# Patient Record
Sex: MALE | Race: WHITE | NOT HISPANIC OR LATINO | Employment: UNEMPLOYED | URBAN - METROPOLITAN AREA
[De-identification: names, ages, dates, MRNs, and addresses within clinical notes are randomized per-mention and may not be internally consistent; named-entity substitution may affect disease eponyms.]

---

## 2017-08-10 PROBLEM — D18.03 HEMANGIOMA OF LIVER: Status: ACTIVE | Noted: 2017-08-10

## 2017-08-10 PROBLEM — K62.5 BRBPR (BRIGHT RED BLOOD PER RECTUM): Status: ACTIVE | Noted: 2017-08-10

## 2018-03-15 PROBLEM — R76.8 HEPATITIS B CORE ANTIBODY POSITIVE: Status: ACTIVE | Noted: 2018-03-15

## 2021-09-20 ENCOUNTER — IMMUNIZATION (OUTPATIENT)
Dept: PRIMARY CARE CLINIC | Facility: CLINIC | Age: 51
End: 2021-09-20
Payer: MEDICAID

## 2021-09-20 DIAGNOSIS — Z23 NEED FOR VACCINATION: Primary | ICD-10-CM

## 2021-09-20 PROCEDURE — 0012A COVID-19, MRNA, LNP-S, PF, 100 MCG/0.5 ML DOSE VACCINE: CPT | Mod: CV19,PBBFAC | Performed by: INTERNAL MEDICINE

## 2021-09-24 ENCOUNTER — HOSPITAL ENCOUNTER (EMERGENCY)
Facility: HOSPITAL | Age: 51
Discharge: HOME | End: 2021-09-24
Attending: STUDENT IN AN ORGANIZED HEALTH CARE EDUCATION/TRAINING PROGRAM
Payer: MEDICAID

## 2021-09-24 VITALS
DIASTOLIC BLOOD PRESSURE: 83 MMHG | WEIGHT: 262.38 LBS | RESPIRATION RATE: 18 BRPM | TEMPERATURE: 97 F | SYSTOLIC BLOOD PRESSURE: 153 MMHG | HEART RATE: 72 BPM | BODY MASS INDEX: 37.64 KG/M2 | OXYGEN SATURATION: 99 %

## 2021-09-24 DIAGNOSIS — L02.512 ABSCESS OF FINGER OF LEFT HAND: Primary | ICD-10-CM

## 2021-09-24 PROCEDURE — 25000003 PHARM REV CODE 250: Performed by: STUDENT IN AN ORGANIZED HEALTH CARE EDUCATION/TRAINING PROGRAM

## 2021-09-24 PROCEDURE — 11730 AVULSION NAIL PLATE SIMPLE 1: CPT | Mod: FA

## 2021-09-24 PROCEDURE — 99283 EMERGENCY DEPT VISIT LOW MDM: CPT | Mod: 25

## 2021-09-24 RX ORDER — LIDOCAINE HYDROCHLORIDE 10 MG/ML
5 INJECTION, SOLUTION EPIDURAL; INFILTRATION; INTRACAUDAL; PERINEURAL
Status: COMPLETED | OUTPATIENT
Start: 2021-09-24 | End: 2021-09-24

## 2021-09-24 RX ORDER — SULFAMETHOXAZOLE AND TRIMETHOPRIM 800; 160 MG/1; MG/1
1 TABLET ORAL
Status: COMPLETED | OUTPATIENT
Start: 2021-09-24 | End: 2021-09-24

## 2021-09-24 RX ORDER — SULFAMETHOXAZOLE AND TRIMETHOPRIM 800; 160 MG/1; MG/1
1 TABLET ORAL 2 TIMES DAILY
Qty: 14 TABLET | Refills: 0 | Status: SHIPPED | OUTPATIENT
Start: 2021-09-24 | End: 2021-10-01

## 2021-09-24 RX ORDER — SULFAMETHOXAZOLE AND TRIMETHOPRIM 800; 160 MG/1; MG/1
1 TABLET ORAL 2 TIMES DAILY
Qty: 14 TABLET | Refills: 0 | Status: SHIPPED | OUTPATIENT
Start: 2021-09-24 | End: 2021-09-24 | Stop reason: SDUPTHER

## 2021-09-24 RX ADMIN — SULFAMETHOXAZOLE AND TRIMETHOPRIM 1 TABLET: 800; 160 TABLET ORAL at 10:09

## 2021-09-24 RX ADMIN — LIDOCAINE HYDROCHLORIDE 50 MG: 10 INJECTION, SOLUTION EPIDURAL; INFILTRATION; INTRACAUDAL; PERINEURAL at 10:09

## 2022-05-12 PROBLEM — R93.1 ELEVATED CORONARY ARTERY CALCIUM SCORE: Status: ACTIVE | Noted: 2022-05-12

## 2022-05-12 PROBLEM — I34.0 MODERATE TO SEVERE MITRAL REGURGITATION: Status: ACTIVE | Noted: 2022-05-12

## 2022-06-21 ENCOUNTER — OFFICE VISIT (OUTPATIENT)
Dept: CARDIOTHORACIC SURGERY | Facility: CLINIC | Age: 52
End: 2022-06-21
Payer: COMMERCIAL

## 2022-06-21 VITALS
HEART RATE: 81 BPM | DIASTOLIC BLOOD PRESSURE: 77 MMHG | BODY MASS INDEX: 41.99 KG/M2 | RESPIRATION RATE: 18 BRPM | SYSTOLIC BLOOD PRESSURE: 131 MMHG | HEIGHT: 70 IN | OXYGEN SATURATION: 95 % | WEIGHT: 293.31 LBS

## 2022-06-21 DIAGNOSIS — I34.0 MODERATE TO SEVERE MITRAL REGURGITATION: Primary | ICD-10-CM

## 2022-06-21 DIAGNOSIS — Q21.12 PFO (PATENT FORAMEN OVALE): ICD-10-CM

## 2022-06-21 PROCEDURE — 99205 OFFICE O/P NEW HI 60 MIN: CPT | Mod: S$GLB,,, | Performed by: THORACIC SURGERY (CARDIOTHORACIC VASCULAR SURGERY)

## 2022-06-21 PROCEDURE — 99205 PR OFFICE/OUTPT VISIT, NEW, LEVL V, 60-74 MIN: ICD-10-PCS | Mod: S$GLB,,, | Performed by: THORACIC SURGERY (CARDIOTHORACIC VASCULAR SURGERY)

## 2022-06-21 PROCEDURE — 99999 PR PBB SHADOW E&M-EST. PATIENT-LVL III: ICD-10-PCS | Mod: PBBFAC,,, | Performed by: THORACIC SURGERY (CARDIOTHORACIC VASCULAR SURGERY)

## 2022-06-21 PROCEDURE — 99999 PR PBB SHADOW E&M-EST. PATIENT-LVL III: CPT | Mod: PBBFAC,,, | Performed by: THORACIC SURGERY (CARDIOTHORACIC VASCULAR SURGERY)

## 2022-06-21 RX ORDER — ALBUTEROL SULFATE 90 UG/1
AEROSOL, METERED RESPIRATORY (INHALATION)
COMMUNITY

## 2022-06-21 RX ORDER — SODIUM PICOSULFATE, MAGNESIUM OXIDE, AND ANHYDROUS CITRIC ACID 10; 3.5; 12 MG/160ML; G/160ML; G/160ML
LIQUID ORAL
COMMUNITY
Start: 2022-01-20

## 2022-06-21 RX ORDER — IBUPROFEN 800 MG/1
800 TABLET ORAL 3 TIMES DAILY PRN
Status: ON HOLD | COMMUNITY
Start: 2022-06-07 | End: 2022-07-27 | Stop reason: HOSPADM

## 2022-06-21 NOTE — LETTER
[facility] - Cardiovasc Surg 2nd Fl  [address]  [facility] [address]  Phone: [phone] June 22, 2022          Hung Larsen MD  [address] [place] [id number]    Patient: Kong Celaya   MR Number: [id number]   YOB: 1970   Date of Visit: 6/21/2022     Dear Dr. Larsen:     I had the pleasure of seeing your patient, Mr. Kong Celaya, in clinic today.  As you know, he is a very pleasant 52-year-old gentleman with lifestyle limiting dyspnea on exertion.  He had a thorough evaluation which demonstrated moderate to severe mitral regurgitation as well as a patent foramen ovale.  I recommended mitral valve repair and closure of his patent foramen ovale, and he agreed with this. We will plan to get this done for him sometime in the near future.      Thank you for sending this pleasant gentleman to me.  It was a pleasure to meet him.  When he does come to surgery, I will certainly keep you appraised of his progress.    Sincerely,          Jet Gastelum MD      [facility]         
detailed exam

## 2022-06-21 NOTE — PROGRESS NOTES
Subjective:      Patient ID: Kong Celaya is a 52 y.o. male.    Chief Complaint: Consult      HPI:  Kong Celaya is a 52 y.o. male who presents as an initial consult for severe mitral regurgitation.  He has a medical history significant for asthma/bronchitis, mild diffuse coronary artery disease, elevated LFT's with liver hemangioma, and recently found severe mitral regurgitation with posterior leaflet prolapse. He reports that he recently found out about his mitral valve complication. He reports life limiting dyspnea on exertion, fatigue, and swelling. He reports swelling despite diuretic initiation.  He reports no longer being able to cut the grass due to SMART.      Family and social history reviewed    Review of patient's allergies indicates:   Allergen Reactions    Adhesive Other (See Comments)     Blisters      Past Medical History:   Diagnosis Date    Arthritis     Asthma     Bronchitis     COPD (chronic obstructive pulmonary disease)     Coronary artery disease     Elevated LFTs     GERD (gastroesophageal reflux disease)     Hepatitis B core antibody positive     PATIENT UNAWARE    Hernia of abdominal wall     Hx of colonic polyps     Liver hemangioma     Renal insufficiency      Past Surgical History:   Procedure Laterality Date    CHOLECYSTECTOMY      COLONOSCOPY N/A 4/12/2018    Procedure: COLONOSCOPY;  Surgeon: Sixto Robertson MD;  Location: [facility];  Service: Endoscopy;  Laterality: N/A;    LEFT HEART CATHETERIZATION Left 5/12/2022    Procedure: Left heart cath;  Surgeon: Hung Larsen MD;  Location: [facility] CATH;  Service: Cardiovascular;  Laterality: Left;    TONSILLECTOMY      TRANSESOPHAGEAL ECHOCARDIOGRAPHY N/A 5/12/2022    Procedure: ECHOCARDIOGRAM, TRANSESOPHAGEAL;  Surgeon: Hung Larsen MD;  Location: [facility] CATH;  Service: Cardiovascular;  Laterality: N/A;    UMBILICAL HERNIA REPAIR      UPPER GASTROINTESTINAL ENDOSCOPY       Family History      Problem Relation (Age of Onset)    Diabetes Mother        Social History     Socioeconomic History    Marital status:     Years of education: 10   Tobacco Use    Smoking status: Current Every Day Smoker     Packs/day: 0.50     Years: 25.00     Pack years: 12.50    Smokeless tobacco: Never Used   Substance and Sexual Activity    Alcohol use: Yes     Alcohol/week: 0.0 standard drinks     Comment: WEEKLY    Drug use: Not Currently     Types: Marijuana    Sexual activity: Yes     Partners: Female       Current Outpatient Medications:     albuterol (PROVENTIL/VENTOLIN HFA) 90 mcg/actuation inhaler, Inhale 2 puffs into the lungs every 6 (six) hours as needed for Wheezing. Rescue, Disp: , Rfl:     aspirin (ECOTRIN) 81 MG EC tablet, Take 81 mg by mouth once daily., Disp: , Rfl:     furosemide (LASIX) 20 MG tablet, Take 20 mg by mouth once daily., Disp: , Rfl:     rosuvastatin (CRESTOR) 10 MG tablet, Take 10 mg by mouth once daily., Disp: , Rfl:     albuterol (ACCUNEB) 0.63 mg/3 mL Nebu, Take 0.63 mg by nebulization every 6 (six) hours as needed., Disp: , Rfl:     albuterol (PROVENTIL/VENTOLIN HFA) 90 mcg/actuation inhaler, albuterol sulfate HFA 90 mcg/actuation aerosol inhaler  INHALE TWO PUFFS BY MOUTH TWICE A DAY AS NEEDED, Disp: , Rfl:     CLENPIQ 10 mg-3.5 gram -12 gram/160 mL Soln, use as directed, Disp: , Rfl:     fluticasone furoate-vilanteroL (BREO ELLIPTA) 200-25 mcg/dose DsDv diskus inhaler, Inhale 1 puff into the lungs once daily. Controller, Disp: , Rfl:      mg tablet, Take 800 mg by mouth 3 (three) times daily as needed., Disp: , Rfl:   Current medications Reviewed    Review of Systems   Constitutional: Positive for fatigue. Negative for activity change, appetite change and fever.   HENT: Negative for nosebleeds.    Respiratory: Positive for shortness of breath. Negative for cough.    Cardiovascular: Positive for leg swelling. Negative for chest pain and palpitations.    Gastrointestinal: Negative for abdominal distention, abdominal pain and nausea.   Genitourinary: Negative for frequency.   Musculoskeletal: Negative for arthralgias and myalgias.   Skin: Negative for rash.   Neurological: Negative for dizziness and numbness.   Hematological: Does not bruise/bleed easily.     Objective:   Physical Exam  Constitutional:       Appearance: He is obese.   HENT:      Head: Normocephalic and atraumatic.   Eyes:      Extraocular Movements: Extraocular movements intact.   Cardiovascular:      Rate and Rhythm: Normal rate and regular rhythm.   Pulmonary:      Effort: Pulmonary effort is normal.   Abdominal:      General: Abdomen is flat. There is distension.      Palpations: Abdomen is soft.   Musculoskeletal:         General: Normal range of motion.      Cervical back: Normal range of motion.      Right lower leg: Edema present.      Left lower leg: Edema present.   Skin:     General: Skin is warm and dry.      Capillary Refill: Capillary refill takes less than 2 seconds.   Neurological:      General: No focal deficit present.       Diagnostic Results: Reviewed   STS score less than 1%  ECHO:  Posterior mitral valve leaflet prolapse   Moderately Severe Mitral Regurgitation (3+)  Ejection fraction 50-55%  Bubble study positive for grade 3 PFO at baseline   Mild right and left atrial enlargement   Trivial to mild tricuspid regurgitation PA pressure is around 20  Assessment:   1. Severe Mitral Regurgitation  Plan:     CTS Attending Note:    I have personally taken the history and examined this patient and agree with the LEAH's note as stated above.  Very pleasant 52-year-old gentleman with lifestyle limiting dyspnea on exertion.  He had a thorough evaluation which demonstrated moderate to severe mitral regurgitation as well as a patent foramen ovale.  I recommended mitral valve repair and closure of his patent foramen ovale.  He agreed with this.  We discussed the risks and benefits of the  proposed procedure, including but not limited to death, stroke, respiratory complications, renal complications, arrythmia, need for permanent pacemaker, and infection. We discussed the STS predicted risk. His questions have been answered, and he wishes to proceed. After a discussion of the advantages and disadvantages of tissue and mechanical prostheses, he indicated that he desires a tissue valve should repair not be feasible.

## 2022-06-22 PROBLEM — Q21.12 PFO (PATENT FORAMEN OVALE): Status: ACTIVE | Noted: 2022-06-22

## 2022-06-28 ENCOUNTER — TELEPHONE (OUTPATIENT)
Dept: CARDIOTHORACIC SURGERY | Facility: CLINIC | Age: 52
End: 2022-06-28
Payer: COMMERCIAL

## 2022-06-28 DIAGNOSIS — I34.0 MODERATE TO SEVERE MITRAL REGURGITATION: Primary | ICD-10-CM

## 2022-06-28 NOTE — TELEPHONE ENCOUNTER
Called pt to review pre-op testing appointments which have been scheduled for Thursday, 7/21. Spoke with pt's wife who verbalized understanding of appointment date, times, and location. Will mail appointment slips to pt's confirmed mailing address. Pt's wife will have pt call to review medications.    Received call from pt. Reviewed medications. Pt will not need to hold any medications prior to surgery. Additional instructions on preparing for surgery will be given at pre-op appointment on 7/21. Pt verbalized understanding of all information.

## 2022-07-21 ENCOUNTER — ANESTHESIA EVENT (OUTPATIENT)
Dept: SURGERY | Facility: HOSPITAL | Age: 52
DRG: 220 | End: 2022-07-21
Payer: COMMERCIAL

## 2022-07-21 ENCOUNTER — HOSPITAL ENCOUNTER (OUTPATIENT)
Dept: CARDIOLOGY | Facility: HOSPITAL | Age: 52
Discharge: HOME | End: 2022-07-21
Attending: THORACIC SURGERY (CARDIOTHORACIC VASCULAR SURGERY)
Payer: COMMERCIAL

## 2022-07-21 ENCOUNTER — HOSPITAL ENCOUNTER (OUTPATIENT)
Dept: RADIOLOGY | Facility: HOSPITAL | Age: 52
Discharge: HOME | End: 2022-07-21
Attending: THORACIC SURGERY (CARDIOTHORACIC VASCULAR SURGERY)
Payer: COMMERCIAL

## 2022-07-21 ENCOUNTER — HOSPITAL ENCOUNTER (OUTPATIENT)
Dept: PULMONOLOGY | Facility: CLINIC | Age: 52
Discharge: HOME | End: 2022-07-21
Payer: COMMERCIAL

## 2022-07-21 ENCOUNTER — TELEPHONE (OUTPATIENT)
Dept: CARDIOTHORACIC SURGERY | Facility: CLINIC | Age: 52
End: 2022-07-21

## 2022-07-21 ENCOUNTER — OFFICE VISIT (OUTPATIENT)
Dept: CARDIOTHORACIC SURGERY | Facility: CLINIC | Age: 52
End: 2022-07-21
Payer: COMMERCIAL

## 2022-07-21 ENCOUNTER — HOSPITAL ENCOUNTER (OUTPATIENT)
Dept: VASCULAR SURGERY | Facility: CLINIC | Age: 52
Discharge: HOME | End: 2022-07-21
Attending: THORACIC SURGERY (CARDIOTHORACIC VASCULAR SURGERY)
Payer: COMMERCIAL

## 2022-07-21 ENCOUNTER — HOSPITAL ENCOUNTER (OUTPATIENT)
Dept: CARDIOLOGY | Facility: CLINIC | Age: 52
Discharge: HOME | End: 2022-07-21
Payer: COMMERCIAL

## 2022-07-21 VITALS
BODY MASS INDEX: 41.95 KG/M2 | DIASTOLIC BLOOD PRESSURE: 76 MMHG | WEIGHT: 293 LBS | HEART RATE: 78 BPM | SYSTOLIC BLOOD PRESSURE: 130 MMHG | HEIGHT: 70 IN

## 2022-07-21 VITALS
BODY MASS INDEX: 42.61 KG/M2 | WEIGHT: 297.63 LBS | HEART RATE: 79 BPM | SYSTOLIC BLOOD PRESSURE: 131 MMHG | DIASTOLIC BLOOD PRESSURE: 76 MMHG | HEIGHT: 70 IN | OXYGEN SATURATION: 96 % | RESPIRATION RATE: 18 BRPM

## 2022-07-21 DIAGNOSIS — I34.0 MODERATE TO SEVERE MITRAL REGURGITATION: ICD-10-CM

## 2022-07-21 DIAGNOSIS — I65.22 STENOSIS OF LEFT CAROTID ARTERY: ICD-10-CM

## 2022-07-21 DIAGNOSIS — I34.0 MITRAL VALVE REGURGITATION: ICD-10-CM

## 2022-07-21 DIAGNOSIS — Z01.818 PRE-OP EXAM: ICD-10-CM

## 2022-07-21 DIAGNOSIS — I34.0 MODERATE TO SEVERE MITRAL REGURGITATION: Primary | ICD-10-CM

## 2022-07-21 LAB
ASCENDING AORTA: 3.49 CM
AV INDEX (PROSTH): 0.98
AV MEAN GRADIENT: 3 MMHG
AV PEAK GRADIENT: 9 MMHG
AV VALVE AREA: 4.36 CM2
AV VELOCITY RATIO: 0.92
BSA FOR ECHO PROCEDURE: 2.56 M2
CV ECHO LV RWT: 0.28 CM
DOP CALC AO PEAK VEL: 1.46 M/S
DOP CALC AO VTI: 24.4 CM
DOP CALC LVOT AREA: 4.4 CM2
DOP CALC LVOT DIAMETER: 2.38 CM
DOP CALC LVOT PEAK VEL: 1.35 M/S
DOP CALC LVOT STROKE VOLUME: 106.32 CM3
DOP CALC MV VTI: 23.03 CM
DOP CALCLVOT PEAK VEL VTI: 23.91 CM
E WAVE DECELERATION TIME: 166.06 MSEC
E/A RATIO: 1.14
E/E' RATIO: 6.38 M/S
ECHO LV POSTERIOR WALL: 0.8 CM (ref 0.6–1.1)
EJECTION FRACTION: 60 %
FRACTIONAL SHORTENING: 28 % (ref 28–44)
INTERVENTRICULAR SEPTUM: 0.8 CM (ref 0.6–1.1)
IVRT: 77.07 MSEC
LA MAJOR: 5.46 CM
LA MINOR: 5.49 CM
LA WIDTH: 4.27 CM
LEFT ATRIUM SIZE: 4.22 CM
LEFT ATRIUM VOLUME INDEX MOD: 39.1 ML/M2
LEFT ATRIUM VOLUME INDEX: 34.1 ML/M2
LEFT ATRIUM VOLUME MOD: 96.26 CM3
LEFT ATRIUM VOLUME: 83.86 CM3
LEFT INTERNAL DIMENSION IN SYSTOLE: 4.03 CM (ref 2.1–4)
LEFT VENTRICLE DIASTOLIC VOLUME INDEX: 62.85 ML/M2
LEFT VENTRICLE DIASTOLIC VOLUME: 154.62 ML
LEFT VENTRICLE MASS INDEX: 68 G/M2
LEFT VENTRICLE SYSTOLIC VOLUME INDEX: 29 ML/M2
LEFT VENTRICLE SYSTOLIC VOLUME: 71.29 ML
LEFT VENTRICULAR INTERNAL DIMENSION IN DIASTOLE: 5.62 CM (ref 3.5–6)
LEFT VENTRICULAR MASS: 166.05 G
LV LATERAL E/E' RATIO: 5.53 M/S
LV SEPTAL E/E' RATIO: 7.55 M/S
MV A" WAVE DURATION": 7.99 MSEC
MV MEAN GRADIENT: 1 MMHG
MV PEAK A VEL: 0.73 M/S
MV PEAK E VEL: 0.83 M/S
MV PEAK GRADIENT: 5 MMHG
MV STENOSIS PRESSURE HALF TIME: 48.16 MS
MV VALVE AREA BY CONTINUITY EQUATION: 4.62 CM2
MV VALVE AREA P 1/2 METHOD: 4.57 CM2
PULM VEIN S/D RATIO: 0.72
PV PEAK D VEL: 0.72 M/S
PV PEAK S VEL: 0.52 M/S
QEF: 59 %
RA MAJOR: 5.35 CM
RA PRESSURE: 3 MMHG
RA WIDTH: 4.08 CM
RIGHT VENTRICULAR END-DIASTOLIC DIMENSION: 4.39 CM
RV TISSUE DOPPLER FREE WALL SYSTOLIC VELOCITY 1 (APICAL 4 CHAMBER VIEW): 16.04 CM/S
SINUS: 3.46 CM
STJ: 2.68 CM
TDI LATERAL: 0.15 M/S
TDI SEPTAL: 0.11 M/S
TDI: 0.13 M/S
TRICUSPID ANNULAR PLANE SYSTOLIC EXCURSION: 2.57 CM

## 2022-07-21 PROCEDURE — 71046 XR CHEST PA AND LATERAL: ICD-10-PCS | Mod: 26,,, | Performed by: RADIOLOGY

## 2022-07-21 PROCEDURE — 93306 ECHO (CUPID ONLY): ICD-10-PCS | Mod: 26,,, | Performed by: INTERNAL MEDICINE

## 2022-07-21 PROCEDURE — 94010 BREATHING CAPACITY TEST: CPT | Mod: S$GLB,,, | Performed by: INTERNAL MEDICINE

## 2022-07-21 PROCEDURE — 3044F PR MOST RECENT HEMOGLOBIN A1C LEVEL <7.0%: ICD-10-PCS | Mod: CPTII,S$GLB,, | Performed by: THORACIC SURGERY (CARDIOTHORACIC VASCULAR SURGERY)

## 2022-07-21 PROCEDURE — 93880 EXTRACRANIAL BILAT STUDY: CPT | Mod: S$GLB,,, | Performed by: SURGERY

## 2022-07-21 PROCEDURE — 99499 NO LOS: ICD-10-PCS | Mod: S$GLB,,, | Performed by: THORACIC SURGERY (CARDIOTHORACIC VASCULAR SURGERY)

## 2022-07-21 PROCEDURE — 93005 ELECTROCARDIOGRAM TRACING: CPT | Mod: S$GLB,,, | Performed by: THORACIC SURGERY (CARDIOTHORACIC VASCULAR SURGERY)

## 2022-07-21 PROCEDURE — 93010 ELECTROCARDIOGRAM REPORT: CPT | Mod: S$GLB,,, | Performed by: INTERNAL MEDICINE

## 2022-07-21 PROCEDURE — 3078F DIAST BP <80 MM HG: CPT | Mod: CPTII,S$GLB,, | Performed by: THORACIC SURGERY (CARDIOTHORACIC VASCULAR SURGERY)

## 2022-07-21 PROCEDURE — 93880 PR DUPLEX SCAN EXTRACRANIAL,BILAT: ICD-10-PCS | Mod: S$GLB,,, | Performed by: SURGERY

## 2022-07-21 PROCEDURE — 94010 BREATHING CAPACITY TEST: ICD-10-PCS | Mod: S$GLB,,, | Performed by: INTERNAL MEDICINE

## 2022-07-21 PROCEDURE — 99999 PR PBB SHADOW E&M-EST. PATIENT-LVL IV: CPT | Mod: PBBFAC,,, | Performed by: THORACIC SURGERY (CARDIOTHORACIC VASCULAR SURGERY)

## 2022-07-21 PROCEDURE — 1159F PR MEDICATION LIST DOCUMENTED IN MEDICAL RECORD: ICD-10-PCS | Mod: CPTII,S$GLB,, | Performed by: THORACIC SURGERY (CARDIOTHORACIC VASCULAR SURGERY)

## 2022-07-21 PROCEDURE — 3075F PR MOST RECENT SYSTOLIC BLOOD PRESS GE 130-139MM HG: ICD-10-PCS | Mod: CPTII,S$GLB,, | Performed by: THORACIC SURGERY (CARDIOTHORACIC VASCULAR SURGERY)

## 2022-07-21 PROCEDURE — 99499 UNLISTED E&M SERVICE: CPT | Mod: S$GLB,,, | Performed by: THORACIC SURGERY (CARDIOTHORACIC VASCULAR SURGERY)

## 2022-07-21 PROCEDURE — 93306 TTE W/DOPPLER COMPLETE: CPT

## 2022-07-21 PROCEDURE — 93306 TTE W/DOPPLER COMPLETE: CPT | Mod: 26,,, | Performed by: INTERNAL MEDICINE

## 2022-07-21 PROCEDURE — 3075F SYST BP GE 130 - 139MM HG: CPT | Mod: CPTII,S$GLB,, | Performed by: THORACIC SURGERY (CARDIOTHORACIC VASCULAR SURGERY)

## 2022-07-21 PROCEDURE — 93010 EKG 12-LEAD: ICD-10-PCS | Mod: S$GLB,,, | Performed by: INTERNAL MEDICINE

## 2022-07-21 PROCEDURE — 99999 PR PBB SHADOW E&M-EST. PATIENT-LVL IV: ICD-10-PCS | Mod: PBBFAC,,, | Performed by: THORACIC SURGERY (CARDIOTHORACIC VASCULAR SURGERY)

## 2022-07-21 PROCEDURE — 94729 PR C02/MEMBANE DIFFUSE CAPACITY: ICD-10-PCS | Mod: S$GLB,,, | Performed by: INTERNAL MEDICINE

## 2022-07-21 PROCEDURE — 71046 X-RAY EXAM CHEST 2 VIEWS: CPT | Mod: 26,,, | Performed by: RADIOLOGY

## 2022-07-21 PROCEDURE — 3078F PR MOST RECENT DIASTOLIC BLOOD PRESSURE < 80 MM HG: ICD-10-PCS | Mod: CPTII,S$GLB,, | Performed by: THORACIC SURGERY (CARDIOTHORACIC VASCULAR SURGERY)

## 2022-07-21 PROCEDURE — 93005 EKG 12-LEAD: ICD-10-PCS | Mod: S$GLB,,, | Performed by: THORACIC SURGERY (CARDIOTHORACIC VASCULAR SURGERY)

## 2022-07-21 PROCEDURE — 3008F PR BODY MASS INDEX (BMI) DOCUMENTED: ICD-10-PCS | Mod: CPTII,S$GLB,, | Performed by: THORACIC SURGERY (CARDIOTHORACIC VASCULAR SURGERY)

## 2022-07-21 PROCEDURE — 3008F BODY MASS INDEX DOCD: CPT | Mod: CPTII,S$GLB,, | Performed by: THORACIC SURGERY (CARDIOTHORACIC VASCULAR SURGERY)

## 2022-07-21 PROCEDURE — 3044F HG A1C LEVEL LT 7.0%: CPT | Mod: CPTII,S$GLB,, | Performed by: THORACIC SURGERY (CARDIOTHORACIC VASCULAR SURGERY)

## 2022-07-21 PROCEDURE — 94729 DIFFUSING CAPACITY: CPT | Mod: S$GLB,,, | Performed by: INTERNAL MEDICINE

## 2022-07-21 PROCEDURE — 71046 X-RAY EXAM CHEST 2 VIEWS: CPT | Mod: TC,FY

## 2022-07-21 PROCEDURE — 1159F MED LIST DOCD IN RCRD: CPT | Mod: CPTII,S$GLB,, | Performed by: THORACIC SURGERY (CARDIOTHORACIC VASCULAR SURGERY)

## 2022-07-21 RX ORDER — DOCUSATE SODIUM 100 MG/1
100 CAPSULE, LIQUID FILLED ORAL 2 TIMES DAILY
Status: CANCELLED | OUTPATIENT
Start: 2022-07-21

## 2022-07-21 RX ORDER — POLYETHYLENE GLYCOL 3350 17 G/17G
17 POWDER, FOR SOLUTION ORAL DAILY
Status: CANCELLED | OUTPATIENT
Start: 2022-07-22

## 2022-07-21 RX ORDER — POTASSIUM CHLORIDE 14.9 MG/ML
60 INJECTION INTRAVENOUS
Status: CANCELLED | OUTPATIENT
Start: 2022-07-21

## 2022-07-21 RX ORDER — ONDANSETRON 2 MG/ML
4 INJECTION INTRAMUSCULAR; INTRAVENOUS EVERY 12 HOURS PRN
Status: CANCELLED | OUTPATIENT
Start: 2022-07-21

## 2022-07-21 RX ORDER — MAGNESIUM SULFATE HEPTAHYDRATE 40 MG/ML
4 INJECTION, SOLUTION INTRAVENOUS
Status: CANCELLED | OUTPATIENT
Start: 2022-07-21

## 2022-07-21 RX ORDER — ACETAMINOPHEN 325 MG/1
650 TABLET ORAL EVERY 4 HOURS PRN
Status: CANCELLED | OUTPATIENT
Start: 2022-07-21

## 2022-07-21 RX ORDER — ACETAMINOPHEN 500 MG
1000 TABLET ORAL
Status: CANCELLED | OUTPATIENT
Start: 2022-07-22 | End: 2022-07-22

## 2022-07-21 RX ORDER — POTASSIUM CHLORIDE 29.8 MG/ML
40 INJECTION INTRAVENOUS
Status: CANCELLED | OUTPATIENT
Start: 2022-07-21

## 2022-07-21 RX ORDER — FAMOTIDINE 10 MG/ML
20 INJECTION INTRAVENOUS 2 TIMES DAILY
Status: CANCELLED | OUTPATIENT
Start: 2022-07-21

## 2022-07-21 RX ORDER — ASPIRIN 325 MG
325 TABLET, DELAYED RELEASE (ENTERIC COATED) ORAL DAILY
Status: CANCELLED | OUTPATIENT
Start: 2022-07-22

## 2022-07-21 RX ORDER — PROPOFOL 10 MG/ML
0-50 INJECTION, EMULSION INTRAVENOUS CONTINUOUS
Status: CANCELLED | OUTPATIENT
Start: 2022-07-21

## 2022-07-21 RX ORDER — IPRATROPIUM BROMIDE AND ALBUTEROL SULFATE 2.5; .5 MG/3ML; MG/3ML
3 SOLUTION RESPIRATORY (INHALATION) EVERY 4 HOURS PRN
Status: CANCELLED | OUTPATIENT
Start: 2022-07-21 | End: 2022-07-22

## 2022-07-21 RX ORDER — MUPIROCIN 20 MG/G
1 OINTMENT TOPICAL 2 TIMES DAILY
Status: CANCELLED | OUTPATIENT
Start: 2022-07-21 | End: 2022-07-26

## 2022-07-21 RX ORDER — MAGNESIUM SULFATE HEPTAHYDRATE 40 MG/ML
2 INJECTION, SOLUTION INTRAVENOUS
Status: CANCELLED | OUTPATIENT
Start: 2022-07-21

## 2022-07-21 RX ORDER — DEXTROSE MONOHYDRATE, SODIUM CHLORIDE, AND POTASSIUM CHLORIDE 50; 1.49; 4.5 G/1000ML; G/1000ML; G/1000ML
INJECTION, SOLUTION INTRAVENOUS CONTINUOUS
Status: CANCELLED | OUTPATIENT
Start: 2022-07-21

## 2022-07-21 RX ORDER — BUDESONIDE AND FORMOTEROL FUMARATE DIHYDRATE 160; 4.5 UG/1; UG/1
2 AEROSOL RESPIRATORY (INHALATION) 2 TIMES DAILY
COMMUNITY
Start: 2022-07-12

## 2022-07-21 RX ORDER — ASPIRIN 300 MG/1
300 SUPPOSITORY RECTAL ONCE AS NEEDED
Status: CANCELLED | OUTPATIENT
Start: 2022-07-21 | End: 2033-12-17

## 2022-07-21 RX ORDER — POTASSIUM CHLORIDE 20 MEQ/1
20 TABLET, EXTENDED RELEASE ORAL EVERY 12 HOURS
Status: CANCELLED | OUTPATIENT
Start: 2022-07-21

## 2022-07-21 RX ORDER — CEFAZOLIN SODIUM/D5W 2 G/100 ML
2 PLASTIC BAG, INJECTION (ML) INTRAVENOUS
Status: CANCELLED | OUTPATIENT
Start: 2022-07-21

## 2022-07-21 RX ORDER — MUPIROCIN 20 MG/G
1 OINTMENT TOPICAL
Status: CANCELLED | OUTPATIENT
Start: 2022-07-21

## 2022-07-21 RX ORDER — BISACODYL 10 MG
10 SUPPOSITORY, RECTAL RECTAL DAILY PRN
Status: CANCELLED | OUTPATIENT
Start: 2022-07-21

## 2022-07-21 RX ORDER — POTASSIUM CHLORIDE 14.9 MG/ML
20 INJECTION INTRAVENOUS
Status: CANCELLED | OUTPATIENT
Start: 2022-07-21

## 2022-07-21 RX ORDER — METOPROLOL TARTRATE 25 MG/1
25 TABLET, FILM COATED ORAL
Status: CANCELLED | OUTPATIENT
Start: 2022-07-21

## 2022-07-21 RX ORDER — ASPIRIN 325 MG
325 TABLET ORAL DAILY
Status: CANCELLED | OUTPATIENT
Start: 2022-07-21

## 2022-07-21 RX ORDER — FENTANYL CITRATE 50 UG/ML
50 INJECTION, SOLUTION INTRAMUSCULAR; INTRAVENOUS
Status: CANCELLED | OUTPATIENT
Start: 2022-07-23

## 2022-07-21 RX ORDER — CEFAZOLIN SODIUM/D5W 2 G/100 ML
2 PLASTIC BAG, INJECTION (ML) INTRAVENOUS
Status: CANCELLED | OUTPATIENT
Start: 2022-07-21 | End: 2022-07-23

## 2022-07-21 RX ORDER — ATORVASTATIN CALCIUM 40 MG/1
40 TABLET, FILM COATED ORAL NIGHTLY
Status: CANCELLED | OUTPATIENT
Start: 2022-07-21

## 2022-07-21 RX ORDER — METOCLOPRAMIDE HYDROCHLORIDE 5 MG/ML
5 INJECTION INTRAMUSCULAR; INTRAVENOUS EVERY 6 HOURS PRN
Status: CANCELLED | OUTPATIENT
Start: 2022-07-21

## 2022-07-21 RX ORDER — LIDOCAINE HYDROCHLORIDE 10 MG/ML
1 INJECTION, SOLUTION EPIDURAL; INFILTRATION; INTRACAUDAL; PERINEURAL
Status: CANCELLED | OUTPATIENT
Start: 2022-07-21

## 2022-07-21 RX ORDER — OXYCODONE HYDROCHLORIDE 5 MG/1
5 TABLET ORAL EVERY 4 HOURS PRN
Status: CANCELLED | OUTPATIENT
Start: 2022-07-21

## 2022-07-21 RX ORDER — OXYCODONE HYDROCHLORIDE 10 MG/1
10 TABLET ORAL EVERY 4 HOURS PRN
Status: CANCELLED | OUTPATIENT
Start: 2022-07-21

## 2022-07-21 RX ORDER — FENTANYL CITRATE 50 UG/ML
25 INJECTION, SOLUTION INTRAMUSCULAR; INTRAVENOUS
Status: CANCELLED | OUTPATIENT
Start: 2022-07-21

## 2022-07-21 RX ORDER — IPRATROPIUM BROMIDE AND ALBUTEROL SULFATE 2.5; .5 MG/3ML; MG/3ML
3 SOLUTION RESPIRATORY (INHALATION) EVERY 4 HOURS
Status: CANCELLED | OUTPATIENT
Start: 2022-07-21 | End: 2022-07-22

## 2022-07-21 RX ORDER — ALBUMIN HUMAN 50 G/1000ML
25 SOLUTION INTRAVENOUS ONCE AS NEEDED
Status: CANCELLED | OUTPATIENT
Start: 2022-07-21 | End: 2033-12-17

## 2022-07-21 RX ORDER — SODIUM CHLORIDE 0.9 % (FLUSH) 0.9 %
10 SYRINGE (ML) INJECTION
Status: CANCELLED | OUTPATIENT
Start: 2022-07-21

## 2022-07-21 RX ORDER — SODIUM CHLORIDE 9 MG/ML
INJECTION, SOLUTION INTRAVENOUS CONTINUOUS
Status: CANCELLED | OUTPATIENT
Start: 2022-07-21

## 2022-07-21 RX ORDER — ASPIRIN 325 MG
325 TABLET ORAL ONCE
Status: CANCELLED | OUTPATIENT
Start: 2022-07-21 | End: 2022-07-21

## 2022-07-21 RX ORDER — FENTANYL CITRATE 50 UG/ML
25 INJECTION, SOLUTION INTRAMUSCULAR; INTRAVENOUS
Status: CANCELLED | OUTPATIENT
Start: 2022-07-21 | End: 2022-07-22

## 2022-07-21 RX ORDER — FAMOTIDINE 20 MG/1
20 TABLET, FILM COATED ORAL 2 TIMES DAILY
Status: CANCELLED | OUTPATIENT
Start: 2022-07-21

## 2022-07-21 RX ORDER — ALBUTEROL SULFATE 0.83 MG/ML
2.5 SOLUTION RESPIRATORY (INHALATION) EVERY 4 HOURS PRN
COMMUNITY
Start: 2022-06-28

## 2022-07-21 NOTE — H&P (VIEW-ONLY)
Subjective:      Patient ID: Kong Celaya is a 52 y.o. male.    Chief Complaint: No chief complaint on file.      HPI:  Kong Celaya is a 52 y.o. male who presents to pre-op for MVr and PFO closure secondary to severe mitral regurgitation.  He has a medical history significant for asthma/bronchitis, mild diffuse coronary artery disease, elevated LFT's with liver hemangioma, and recently found severe mitral regurgitation with posterior leaflet prolapse. He reports that he recently found out about his mitral valve complication. He reports life limiting dyspnea on exertion, fatigue, and swelling. He reports swelling despite diuretic initiation.  He reports no longer being able to cut the grass due to SMART.      Current medications Reviewed    Review of Systems   Constitutional: Negative for activity change, appetite change, fatigue and fever.   HENT: Negative for nosebleeds.    Respiratory: Negative for cough and shortness of breath.    Cardiovascular: Negative for chest pain, palpitations and leg swelling.   Gastrointestinal: Negative for abdominal distention, abdominal pain and nausea.   Genitourinary: Negative for frequency.   Musculoskeletal: Negative for arthralgias and myalgias.   Skin: Negative for rash.   Neurological: Negative for dizziness and numbness.   Hematological: Does not bruise/bleed easily.     Objective:   Physical Exam  HENT:      Head: Normocephalic and atraumatic.   Eyes:      Extraocular Movements: Extraocular movements intact.   Cardiovascular:      Rate and Rhythm: Normal rate and regular rhythm.   Pulmonary:      Effort: Pulmonary effort is normal.   Abdominal:      General: Abdomen is flat.      Palpations: Abdomen is soft.   Musculoskeletal:         General: Normal range of motion.      Cervical back: Normal range of motion.   Skin:     General: Skin is warm and dry.      Capillary Refill: Capillary refill takes less than 2 seconds.   Neurological:      General: No focal  deficit present.       Diagnotic Results:    Carotid:  Right Side:  Normal   Left Side:  1-39%    FEV1 58.1  DLCO: 84.2  ECHO:  Posterior mitral valve leaflet prolapse   Moderately Severe Mitral Regurgitation (3+)  Ejection fraction 50-55%  Bubble study positive for grade 3 PFO at baseline   Mild right and left atrial enlargement   Trivial to mild tricuspid regurgitation PA pressure is around 20  Assessment:   1. Severe Mitral Valve Regurgitation  Plan:   Continue with planned MVr and PFO closure  CTS Attending Note:    I have personally taken the history and examined this patient and agree with the LEAH's note as stated above.  Very pleasant 52-year-old gentleman with severe MR and patent foramen ovale.  We plan mitral valve repair and closure of PFO.  His questions have been answered, and he wishes to proceed.

## 2022-07-21 NOTE — ANESTHESIA PREPROCEDURE EVALUATION
[facility]  Anesthesia Pre-Operative Evaluation         Patient Name: Kong Celaya  YOB: 1970  MRN: [id number]    SUBJECTIVE:     Pre-operative evaluation for Procedure(s) (LRB):  Valvuloplasty, Mitral (N/A)  REPLACEMENT, MITRAL VALVE (N/A)     07/21/2022    Kong Celaya is a 52 y.o. male w/ a significant PMHx of gerd, morbid obesity, COPD, CAD, liver hemangioma, asthma, MR w posterior leaflet prolapse and PFO. found severe mitral regurgitation with posterior leaflet prolapse. He reports life limiting dyspnea on exertion, fatigue, and swelling (despite diruesis).     Patient now presents for the above procedure(s).    TTE 7/21/22:  · The left ventricle is mildly enlarged with normal systolic function.  · The estimated ejection fraction is 60%.  · Normal left ventricular diastolic function.  · Moderate mitral regurgitation.  · Normal right ventricular size with normal right ventricular systolic function.  · The quantitatively derived ejection fraction is 59%.  · Mild left atrial enlargement.  · There is moderate mitral prolapse of the posterior mitral leaflet.  · Mild tricuspid regurgitation.  · Normal central venous pressure (3 mmHg).      LDA: None documented.     Prev airway:   Placement Date: 10/22/15; Placement Time: 0728; Method of Intubation: Direct laryngoscopy; Inserted by: CRNA; Airway Device: Endotracheal Tube-Hi/Lo; Induction type: IV - routine; Mask Ventilation: Easy - oral; Intubated: Postinduction; Blade: Patrick #2; Airway Device Size: 8.0; Style: Cuffed; Cuff Inflation: Minimal occlusive pressure; Placement Verified By: Auscultation, Colorimetric EtCO2 device; Grade: Grade I; Complicating Factors: None; Intubation Findings: Positive EtCO2, Bilateral breath sounds, Atraumatic/Condition of teeth unchanged;  Depth of Insertion: 22; Securment: Lips; Complications: None; Breath Sounds: Equal Bilateral; Insertion Attempts: 1; Removal Date: 10/22/15;   Removal Time: 0907    Drips: None documented.      Patient Active Problem List   Diagnosis    Hemangioma of liver    BRBPR (bright red blood per rectum)    Hepatitis B core antibody positive    Moderate to severe mitral regurgitation    Elevated coronary artery calcium score    Angina, class II    PFO (patent foramen ovale)    Pre-op exam    Stenosis of left carotid artery       Review of patient's allergies indicates:   Allergen Reactions    Adhesive Other (See Comments)     Blisters        Current Inpatient Medications:      No current facility-administered medications on file prior to encounter.     Current Outpatient Medications on File Prior to Encounter   Medication Sig Dispense Refill    albuterol (ACCUNEB) 0.63 mg/3 mL Nebu Take 0.63 mg by nebulization every 6 (six) hours as needed.      albuterol (PROVENTIL/VENTOLIN HFA) 90 mcg/actuation inhaler Inhale 2 puffs into the lungs every 6 (six) hours as needed for Wheezing. Rescue      albuterol (PROVENTIL/VENTOLIN HFA) 90 mcg/actuation inhaler albuterol sulfate HFA 90 mcg/actuation aerosol inhaler   INHALE TWO PUFFS BY MOUTH TWICE A DAY AS NEEDED      aspirin (ECOTRIN) 81 MG EC tablet Take 81 mg by mouth once daily.      CLENPIQ 10 mg-3.5 gram -12 gram/160 mL Soln use as directed      fluticasone furoate-vilanteroL (BREO ELLIPTA) 200-25 mcg/dose DsDv diskus inhaler Inhale 1 puff into the lungs once daily. Controller      furosemide (LASIX) 20 MG tablet Take 20 mg by mouth once daily.       mg tablet Take 800 mg by mouth 3 (three) times daily as needed.      rosuvastatin (CRESTOR) 10 MG tablet Take 10 mg by mouth once daily.         Past Surgical History:   Procedure Laterality Date    CHOLECYSTECTOMY      COLONOSCOPY N/A 4/12/2018    Procedure: COLONOSCOPY;  Surgeon: Sixto Robertson MD;  Location: [facility];  Service: Endoscopy;  Laterality: N/A;    LEFT HEART CATHETERIZATION Left 5/12/2022    Procedure: Left heart cath;   Surgeon: Hung Larsen MD;  Location: [facility] CATH;  Service: Cardiovascular;  Laterality: Left;    TONSILLECTOMY      TRANSESOPHAGEAL ECHOCARDIOGRAPHY N/A 5/12/2022    Procedure: ECHOCARDIOGRAM, TRANSESOPHAGEAL;  Surgeon: Hung Larsen MD;  Location: [facility] CATH;  Service: Cardiovascular;  Laterality: N/A;    UMBILICAL HERNIA REPAIR      UPPER GASTROINTESTINAL ENDOSCOPY         Social History     Socioeconomic History    Marital status:     Years of education: 10   Tobacco Use    Smoking status: Current Every Day Smoker     Packs/day: 0.50     Years: 25.00     Pack years: 12.50    Smokeless tobacco: Never Used   Substance and Sexual Activity    Alcohol use: Yes     Alcohol/week: 0.0 standard drinks     Comment: WEEKLY    Drug use: Not Currently     Types: Marijuana    Sexual activity: Yes     Partners: Female       OBJECTIVE:     Vital Signs Range (Last 24H):  Pulse:  [78-79]   Resp:  [18]   BP: (130-131)/(76)   SpO2:  [96 %]       Significant Labs:  Lab Results   Component Value Date    WBC 8.55 07/21/2022    HGB 15.6 07/21/2022    HCT 46.6 07/21/2022     07/21/2022    ALT 76 (H) 07/21/2022    AST 41 (H) 07/21/2022     07/21/2022    K 4.3 07/21/2022     07/21/2022    CREATININE 0.9 07/21/2022    BUN 11 07/21/2022    CO2 30 (H) 07/21/2022    INR 1.0 07/21/2022    HGBA1C 5.4 07/21/2022       Diagnostic Studies: No relevant studies.    EKG:   Results for orders placed or performed during the hospital encounter of 07/21/22   EKG 12-lead    Collection Time: 07/21/22 11:52 AM    Narrative    Test Reason : I34.0,    Vent. Rate : 086 BPM     Atrial Rate : 086 BPM     P-R Int : 156 ms          QRS Dur : 086 ms      QT Int : 352 ms       P-R-T Axes : 070 064 051 degrees     QTc Int : 421 ms    Normal sinus rhythm  Normal ECG  No previous ECGs available  Confirmed by Giles Olson MD ([id number]) on 7/21/2022 2:07:10 PM    Referred By: ADELINA CABELLO           Confirmed By:Giles Olson MD  "      2D ECHO:  TTE:  Results for orders placed or performed during the hospital encounter of 07/21/22   Echo   Result Value Ref Range    BSA 2.56 m2    TDI SEPTAL 0.11 m/s    LV LATERAL E/E' RATIO 5.53 m/s    LV SEPTAL E/E' RATIO 7.55 m/s    LA WIDTH 4.27 cm    TDI LATERAL 0.15 m/s    LVIDd 5.62 3.5 - 6.0 cm    IVS 0.80 0.6 - 1.1 cm    Posterior Wall 0.80 0.6 - 1.1 cm    LVIDs 4.03 (A) 2.1 - 4.0 cm    FS 28 28 - 44 %    LA volume 83.86 cm3    Sinus 3.46 cm    STJ 2.68 cm    Ascending aorta 3.49 cm    LV mass 166.05 g    LA size 4.22 cm    RVDD 4.39 cm    TAPSE 2.57 cm    RV S' 16.04 cm/s    Left Ventricle Relative Wall Thickness 0.28 cm    AV mean gradient 3 mmHg    AV valve area 4.36 cm2    AV Velocity Ratio 0.92     AV index (prosthetic) 0.98     MV mean gradient 1 mmHg    MV valve area p 1/2 method 4.57 cm2    MV valve area by continuity eq 4.62 cm2    E/A ratio 1.14     Mean e' 0.13 m/s    E wave deceleration time 166.06 msec    IVRT 77.07 msec    MV "A" wave duration 7.99 msec    Pulm vein S/D ratio 0.72     LVOT diameter 2.38 cm    LVOT area 4.4 cm2    LVOT peak luciano 1.35 m/s    LVOT peak VTI 23.91 cm    Ao peak luciano 1.46 m/s    Ao VTI 24.40 cm    LVOT stroke volume 106.32 cm3    AV peak gradient 9 mmHg    MV peak gradient 5 mmHg    E/E' ratio 6.38 m/s    MV Peak E Luciano 0.83 m/s    MV VTI 23.03 cm    MV stenosis pressure 1/2 time 48.16 ms    MV Peak A Luciano 0.73 m/s    PV Peak S Luciano 0.52 m/s    PV Peak D Luciano 0.72 m/s    LV Systolic Volume 71.29 mL    LV Systolic Volume Index 29.0 mL/m2    LV Diastolic Volume 154.62 mL    LV Diastolic Volume Index 62.85 mL/m2    LA Volume Index 34.1 mL/m2    LV Mass Index 68 g/m2    RA Major Axis 5.35 cm    Left Atrium Minor Axis 5.49 cm    Left Atrium Major Axis 5.46 cm    LA Volume Index (Mod) 39.1 mL/m2    LA volume (mod) 96.26 cm3    RA Width 4.08 cm    Right Atrial Pressure (from IVC) 3 mmHg    QEF 59 %    EF 60 %    Narrative    · The left ventricle is mildly enlarged with " normal systolic function.  · The estimated ejection fraction is 60%.  · Normal left ventricular diastolic function.  · Moderate mitral regurgitation.  · Normal right ventricular size with normal right ventricular systolic   function.  · The quantitatively derived ejection fraction is 59%.  · Mild left atrial enlargement.  · There is moderate mitral prolapse of the posterior mitral leaflet.  · Mild tricuspid regurgitation.  · Normal central venous pressure (3 mmHg).          BRANDON:  No results found for this or any previous visit.    ASSESSMENT/PLAN:         Pre-op Assessment    I have reviewed the Patient Summary Reports.     I have reviewed the Nursing Notes.    I have reviewed the Medications.     Review of Systems  Anesthesia Hx:  No problems with previous Anesthesia Denies Hx of Anesthetic complications  History of prior surgery of interest to airway management or planning: Denies Family Hx of Anesthesia complications.   Denies Personal Hx of Anesthesia complications.   Hematology/Oncology:  Hematology Normal   Oncology Normal     EENT/Dental:EENT/Dental Normal   Cardiovascular:   Denies Hypertension. Valvular problems/Murmurs, MVP, MR CAD    Angina no hyperlipidemia SMART ECG has been reviewed.    Pulmonary:   COPD Asthma Denies Sleep Apnea.    Renal/:   Chronic Renal Disease, CRI    Hepatic/GI:   GERD Liver Disease,    Musculoskeletal:   Denies Arthritis.     Neurological:  Neurology Normal    Endocrine:  Endocrine Normal  Morbid Obesity / BMI > 40  Psych:  Psychiatric Normal              Anesthesia Plan  Type of Anesthesia, risks & benefits discussed:    Anesthesia Type: Gen ETT  Intra-op Monitoring Plan: Standard ASA Monitors, Art Line, Central Line and BRANDON  Post Op Pain Control Plan: multimodal analgesia, IV/PO Opioids PRN and peripheral nerve block  Induction:  IV  Airway Plan: Direct and Video, Post-Induction  Informed Consent: Informed consent signed with the Patient and all parties understand the risks  and agree with anesthesia plan.  All questions answered.   ASA Score: 4    Ready For Surgery From Anesthesia Perspective.     .

## 2022-07-21 NOTE — TELEPHONE ENCOUNTER
Informed pt of arrival time for surgery.  Pt instructed to report to [facility] at 5:00 tomorrow morning.  Pt reminded to perform Hibiclens shower tonight and tomorrow morning, and to become NPO at midnight.  Pt verbalized understanding.

## 2022-07-22 ENCOUNTER — HOSPITAL ENCOUNTER (INPATIENT)
Facility: HOSPITAL | Age: 52
LOS: 5 days | Discharge: HOME | DRG: 220 | End: 2022-07-27
Attending: THORACIC SURGERY (CARDIOTHORACIC VASCULAR SURGERY) | Admitting: THORACIC SURGERY (CARDIOTHORACIC VASCULAR SURGERY)
Payer: COMMERCIAL

## 2022-07-22 ENCOUNTER — ANESTHESIA (OUTPATIENT)
Dept: SURGERY | Facility: HOSPITAL | Age: 52
DRG: 220 | End: 2022-07-22
Payer: COMMERCIAL

## 2022-07-22 DIAGNOSIS — I34.0 MODERATE TO SEVERE MITRAL REGURGITATION: ICD-10-CM

## 2022-07-22 DIAGNOSIS — Z98.890 S/P MVR (MITRAL VALVE REPAIR): ICD-10-CM

## 2022-07-22 DIAGNOSIS — I34.0 MITRAL VALVE REGURGITATION: ICD-10-CM

## 2022-07-22 DIAGNOSIS — T14.8XXA SURGICAL WOUND PRESENT: ICD-10-CM

## 2022-07-22 DIAGNOSIS — Z95.2 S/P MVR (MITRAL VALVE REPLACEMENT): ICD-10-CM

## 2022-07-22 DIAGNOSIS — I49.9 ARRHYTHMIA: ICD-10-CM

## 2022-07-22 DIAGNOSIS — R00.0 TACHYCARDIA: ICD-10-CM

## 2022-07-22 DIAGNOSIS — Z98.890 HISTORY OF HEART SURGERY: ICD-10-CM

## 2022-07-22 DIAGNOSIS — Z98.890 S/P MITRAL VALVE REPAIR: Primary | ICD-10-CM

## 2022-07-22 DIAGNOSIS — R73.9 HYPERGLYCEMIA: ICD-10-CM

## 2022-07-22 DIAGNOSIS — Z99.11 ENCOUNTER FOR WEANING FROM VENTILATOR: ICD-10-CM

## 2022-07-22 LAB
ABO + RH BLD: NORMAL
ALBUMIN SERPL BCP-MCNC: 2.9 G/DL (ref 3.5–5.2)
ALLENS TEST: ABNORMAL
ALLENS TEST: NORMAL
ALLENS TEST: NORMAL
ALP SERPL-CCNC: 65 U/L (ref 55–135)
ALT SERPL W/O P-5'-P-CCNC: 96 U/L (ref 10–44)
ANION GAP SERPL CALC-SCNC: 11 MMOL/L (ref 8–16)
ANION GAP SERPL CALC-SCNC: 6 MMOL/L (ref 8–16)
APTT BLDCRRT: 24.5 SEC (ref 21–32)
AST SERPL-CCNC: 151 U/L (ref 10–40)
BASOPHILS # BLD AUTO: 0.07 K/UL (ref 0–0.2)
BASOPHILS NFR BLD: 0.2 % (ref 0–1.9)
BILIRUB SERPL-MCNC: 1.3 MG/DL (ref 0.1–1)
BLD GP AB SCN CELLS X3 SERPL QL: NORMAL
BUN SERPL-MCNC: 15 MG/DL (ref 6–20)
BUN SERPL-MCNC: 16 MG/DL (ref 6–20)
CALCIUM SERPL-MCNC: 7.1 MG/DL (ref 8.7–10.5)
CALCIUM SERPL-MCNC: 7.6 MG/DL (ref 8.7–10.5)
CHLORIDE SERPL-SCNC: 110 MMOL/L (ref 95–110)
CHLORIDE SERPL-SCNC: 114 MMOL/L (ref 95–110)
CO2 SERPL-SCNC: 17 MMOL/L (ref 23–29)
CO2 SERPL-SCNC: 21 MMOL/L (ref 23–29)
CREAT SERPL-MCNC: 0.9 MG/DL (ref 0.5–1.4)
CREAT SERPL-MCNC: 1.1 MG/DL (ref 0.5–1.4)
DELSYS: ABNORMAL
DELSYS: NORMAL
DELSYS: NORMAL
DIFFERENTIAL METHOD: ABNORMAL
EOSINOPHIL # BLD AUTO: 0.1 K/UL (ref 0–0.5)
EOSINOPHIL NFR BLD: 0.2 % (ref 0–8)
ERYTHROCYTE [DISTWIDTH] IN BLOOD BY AUTOMATED COUNT: 12.5 % (ref 11.5–14.5)
ERYTHROCYTE [SEDIMENTATION RATE] IN BLOOD BY WESTERGREN METHOD: 26 MM/H
EST. GFR  (AFRICAN AMERICAN): >60 ML/MIN/1.73 M^2
EST. GFR  (AFRICAN AMERICAN): >60 ML/MIN/1.73 M^2
EST. GFR  (NON AFRICAN AMERICAN): >60 ML/MIN/1.73 M^2
EST. GFR  (NON AFRICAN AMERICAN): >60 ML/MIN/1.73 M^2
FIO2: 0.7
FIO2: 0.8
FIO2: 0.8
FIO2: 1
FIO2: 1
FIO2: 100
FIO2: 100
FIO2: 40
FLOW: 10
FLOW: 8
GLUCOSE SERPL-MCNC: 108 MG/DL (ref 70–110)
GLUCOSE SERPL-MCNC: 122 MG/DL (ref 70–110)
GLUCOSE SERPL-MCNC: 125 MG/DL (ref 70–110)
GLUCOSE SERPL-MCNC: 128 MG/DL (ref 70–110)
GLUCOSE SERPL-MCNC: 144 MG/DL (ref 70–110)
GLUCOSE SERPL-MCNC: 157 MG/DL (ref 70–110)
GLUCOSE SERPL-MCNC: 157 MG/DL (ref 70–110)
GLUCOSE SERPL-MCNC: 166 MG/DL (ref 70–110)
GLUCOSE SERPL-MCNC: 177 MG/DL (ref 70–110)
GLUCOSE SERPL-MCNC: 187 MG/DL (ref 70–110)
GLUCOSE SERPL-MCNC: 202 MG/DL (ref 70–110)
HCO3 UR-SCNC: 19.8 MMOL/L (ref 24–28)
HCO3 UR-SCNC: 20.7 MMOL/L (ref 24–28)
HCO3 UR-SCNC: 20.7 MMOL/L (ref 24–28)
HCO3 UR-SCNC: 21.5 MMOL/L (ref 24–28)
HCO3 UR-SCNC: 21.9 MMOL/L (ref 24–28)
HCO3 UR-SCNC: 22 MMOL/L (ref 24–28)
HCO3 UR-SCNC: 23.2 MMOL/L (ref 24–28)
HCO3 UR-SCNC: 23.8 MMOL/L (ref 24–28)
HCO3 UR-SCNC: 23.9 MMOL/L (ref 24–28)
HCO3 UR-SCNC: 23.9 MMOL/L (ref 24–28)
HCO3 UR-SCNC: 25.4 MMOL/L (ref 24–28)
HCO3 UR-SCNC: 25.4 MMOL/L (ref 24–28)
HCO3 UR-SCNC: 25.5 MMOL/L (ref 24–28)
HCO3 UR-SCNC: 26.2 MMOL/L (ref 24–28)
HCO3 UR-SCNC: 27 MMOL/L (ref 24–28)
HCO3 UR-SCNC: 29 MMOL/L (ref 24–28)
HCO3 UR-SCNC: 29.4 MMOL/L (ref 24–28)
HCO3 UR-SCNC: 31.7 MMOL/L (ref 24–28)
HCT VFR BLD AUTO: 45.6 % (ref 40–54)
HCT VFR BLD CALC: 35 %PCV (ref 36–54)
HCT VFR BLD CALC: 37 %PCV (ref 36–54)
HCT VFR BLD CALC: 37 %PCV (ref 36–54)
HCT VFR BLD CALC: 38 %PCV (ref 36–54)
HCT VFR BLD CALC: 39 %PCV (ref 36–54)
HCT VFR BLD CALC: 40 %PCV (ref 36–54)
HCT VFR BLD CALC: 41 %PCV (ref 36–54)
HCT VFR BLD CALC: 43 %PCV (ref 36–54)
HCT VFR BLD CALC: 43 %PCV (ref 36–54)
HCT VFR BLD CALC: 45 %PCV (ref 36–54)
HCT VFR BLD CALC: 46 %PCV (ref 36–54)
HCT VFR BLD CALC: 47 %PCV (ref 36–54)
HCT VFR BLD CALC: 47 %PCV (ref 36–54)
HGB BLD-MCNC: 15.4 G/DL (ref 14–18)
IMM GRANULOCYTES # BLD AUTO: 1.09 K/UL (ref 0–0.04)
IMM GRANULOCYTES NFR BLD AUTO: 3.2 % (ref 0–0.5)
INR PPP: 1.1 (ref 0.8–1.2)
LDH SERPL L TO P-CCNC: 0.61 MMOL/L (ref 0.36–1.25)
LDH SERPL L TO P-CCNC: 0.79 MMOL/L (ref 0.36–1.25)
LDH SERPL L TO P-CCNC: 0.94 MMOL/L (ref 0.36–1.25)
LDH SERPL L TO P-CCNC: 1.08 MMOL/L (ref 0.36–1.25)
LDH SERPL L TO P-CCNC: 1.48 MMOL/L (ref 0.36–1.25)
LDH SERPL L TO P-CCNC: 1.59 MMOL/L (ref 0.36–1.25)
LDH SERPL L TO P-CCNC: 2.6 MMOL/L (ref 0.36–1.25)
LDH SERPL L TO P-CCNC: 2.72 MMOL/L (ref 0.36–1.25)
LDH SERPL L TO P-CCNC: 2.79 MMOL/L (ref 0.36–1.25)
LDH SERPL L TO P-CCNC: 2.82 MMOL/L (ref 0.36–1.25)
LYMPHOCYTES # BLD AUTO: 3.6 K/UL (ref 1–4.8)
LYMPHOCYTES NFR BLD: 10.6 % (ref 18–48)
MAGNESIUM SERPL-MCNC: 1.9 MG/DL (ref 1.6–2.6)
MAGNESIUM SERPL-MCNC: 1.9 MG/DL (ref 1.6–2.6)
MCH RBC QN AUTO: 33.1 PG (ref 27–31)
MCHC RBC AUTO-ENTMCNC: 33.8 G/DL (ref 32–36)
MCV RBC AUTO: 98 FL (ref 82–98)
MIN VOL: 13
MODE: ABNORMAL
MODE: NORMAL
MODE: NORMAL
MONOCYTES # BLD AUTO: 3.5 K/UL (ref 0.3–1)
MONOCYTES NFR BLD: 10.2 % (ref 4–15)
NEUTROPHILS # BLD AUTO: 26 K/UL (ref 1.8–7.7)
NEUTROPHILS NFR BLD: 75.6 % (ref 38–73)
NRBC BLD-RTO: 0 /100 WBC
PCO2 BLDA: 39.8 MMHG (ref 35–45)
PCO2 BLDA: 40.3 MMHG (ref 35–45)
PCO2 BLDA: 40.7 MMHG (ref 35–45)
PCO2 BLDA: 41.6 MMHG (ref 35–45)
PCO2 BLDA: 42.1 MMHG (ref 35–45)
PCO2 BLDA: 45.5 MMHG (ref 35–45)
PCO2 BLDA: 45.6 MMHG (ref 35–45)
PCO2 BLDA: 46.1 MMHG (ref 35–45)
PCO2 BLDA: 48.1 MMHG (ref 35–45)
PCO2 BLDA: 49.9 MMHG (ref 35–45)
PCO2 BLDA: 51.9 MMHG (ref 35–45)
PCO2 BLDA: 52.1 MMHG (ref 35–45)
PCO2 BLDA: 55.3 MMHG (ref 35–45)
PCO2 BLDA: 56.3 MMHG (ref 35–45)
PCO2 BLDA: 56.3 MMHG (ref 35–45)
PCO2 BLDA: 62.4 MMHG (ref 35–45)
PCO2 BLDA: 63.7 MMHG (ref 35–45)
PCO2 BLDA: 69.8 MMHG (ref 35–45)
PEEP: 10
PEEP: 14
PEEP: 5
PEEP: 5
PH SMN: 7.2 [PH] (ref 7.35–7.45)
PH SMN: 7.21 [PH] (ref 7.35–7.45)
PH SMN: 7.24 [PH] (ref 7.35–7.45)
PH SMN: 7.26 [PH] (ref 7.35–7.45)
PH SMN: 7.27 [PH] (ref 7.35–7.45)
PH SMN: 7.28 [PH] (ref 7.35–7.45)
PH SMN: 7.3 [PH] (ref 7.35–7.45)
PH SMN: 7.31 [PH] (ref 7.35–7.45)
PH SMN: 7.32 [PH] (ref 7.35–7.45)
PH SMN: 7.33 [PH] (ref 7.35–7.45)
PH SMN: 7.37 [PH] (ref 7.35–7.45)
PH SMN: 7.37 [PH] (ref 7.35–7.45)
PH SMN: 7.38 [PH] (ref 7.35–7.45)
PHOSPHATE SERPL-MCNC: 2.7 MG/DL (ref 2.7–4.5)
PHOSPHATE SERPL-MCNC: 3.5 MG/DL (ref 2.7–4.5)
PIP: 24
PLATELET # BLD AUTO: 161 K/UL (ref 150–450)
PMV BLD AUTO: 10.4 FL (ref 9.2–12.9)
PO2 BLDA: 114 MMHG (ref 80–100)
PO2 BLDA: 130 MMHG (ref 80–100)
PO2 BLDA: 164 MMHG (ref 80–100)
PO2 BLDA: 169 MMHG (ref 80–100)
PO2 BLDA: 246 MMHG (ref 80–100)
PO2 BLDA: 257 MMHG (ref 80–100)
PO2 BLDA: 277 MMHG (ref 80–100)
PO2 BLDA: 48 MMHG (ref 40–60)
PO2 BLDA: 494 MMHG (ref 80–100)
PO2 BLDA: 509 MMHG (ref 80–100)
PO2 BLDA: 516 MMHG (ref 80–100)
PO2 BLDA: 52 MMHG (ref 40–60)
PO2 BLDA: 61 MMHG (ref 80–100)
PO2 BLDA: 72 MMHG (ref 80–100)
PO2 BLDA: 80 MMHG (ref 80–100)
PO2 BLDA: 80 MMHG (ref 80–100)
PO2 BLDA: 85 MMHG (ref 80–100)
PO2 BLDA: 96 MMHG (ref 80–100)
POC BE: -1 MMOL/L
POC BE: -1 MMOL/L
POC BE: -2 MMOL/L
POC BE: -3 MMOL/L
POC BE: -3 MMOL/L
POC BE: -4 MMOL/L
POC BE: -5 MMOL/L
POC BE: -5 MMOL/L
POC BE: -6 MMOL/L
POC BE: -7 MMOL/L
POC BE: 0 MMOL/L
POC BE: 1 MMOL/L
POC BE: 4 MMOL/L
POC BE: 4 MMOL/L
POC BE: 6 MMOL/L
POC IONIZED CALCIUM: 1 MMOL/L (ref 1.06–1.42)
POC IONIZED CALCIUM: 1.01 MMOL/L (ref 1.06–1.42)
POC IONIZED CALCIUM: 1.01 MMOL/L (ref 1.06–1.42)
POC IONIZED CALCIUM: 1.02 MMOL/L (ref 1.06–1.42)
POC IONIZED CALCIUM: 1.03 MMOL/L (ref 1.06–1.42)
POC IONIZED CALCIUM: 1.03 MMOL/L (ref 1.06–1.42)
POC IONIZED CALCIUM: 1.04 MMOL/L (ref 1.06–1.42)
POC IONIZED CALCIUM: 1.04 MMOL/L (ref 1.06–1.42)
POC IONIZED CALCIUM: 1.06 MMOL/L (ref 1.06–1.42)
POC IONIZED CALCIUM: 1.07 MMOL/L (ref 1.06–1.42)
POC IONIZED CALCIUM: 1.08 MMOL/L (ref 1.06–1.42)
POC IONIZED CALCIUM: 1.11 MMOL/L (ref 1.06–1.42)
POC IONIZED CALCIUM: 1.16 MMOL/L (ref 1.06–1.42)
POC IONIZED CALCIUM: 1.26 MMOL/L (ref 1.06–1.42)
POC SATURATED O2: 100 % (ref 95–100)
POC SATURATED O2: 81 % (ref 95–100)
POC SATURATED O2: 83 % (ref 95–100)
POC SATURATED O2: 88 % (ref 95–100)
POC SATURATED O2: 93 % (ref 95–100)
POC SATURATED O2: 94 % (ref 95–100)
POC SATURATED O2: 94 % (ref 95–100)
POC SATURATED O2: 95 % (ref 95–100)
POC SATURATED O2: 97 % (ref 95–100)
POC SATURATED O2: 98 % (ref 95–100)
POC SATURATED O2: 99 % (ref 95–100)
POC TCO2: 21 MMOL/L (ref 23–27)
POC TCO2: 22 MMOL/L (ref 23–27)
POC TCO2: 22 MMOL/L (ref 23–27)
POC TCO2: 23 MMOL/L (ref 23–27)
POC TCO2: 25 MMOL/L (ref 23–27)
POC TCO2: 26 MMOL/L (ref 23–27)
POC TCO2: 27 MMOL/L (ref 23–27)
POC TCO2: 27 MMOL/L (ref 23–27)
POC TCO2: 27 MMOL/L (ref 24–29)
POC TCO2: 28 MMOL/L (ref 23–27)
POC TCO2: 29 MMOL/L (ref 23–27)
POC TCO2: 31 MMOL/L (ref 23–27)
POC TCO2: 31 MMOL/L (ref 24–29)
POC TCO2: 34 MMOL/L (ref 23–27)
POC TEMPERATURE: ABNORMAL
POCT GLUCOSE: 141 MG/DL (ref 70–110)
POCT GLUCOSE: 144 MG/DL (ref 70–110)
POCT GLUCOSE: 150 MG/DL (ref 70–110)
POCT GLUCOSE: 150 MG/DL (ref 70–110)
POCT GLUCOSE: 157 MG/DL (ref 70–110)
POCT GLUCOSE: 169 MG/DL (ref 70–110)
POCT GLUCOSE: 175 MG/DL (ref 70–110)
POCT GLUCOSE: 175 MG/DL (ref 70–110)
POCT GLUCOSE: 178 MG/DL (ref 70–110)
POCT GLUCOSE: 229 MG/DL (ref 70–110)
POTASSIUM BLD-SCNC: 4.1 MMOL/L (ref 3.5–5.1)
POTASSIUM BLD-SCNC: 4.2 MMOL/L (ref 3.5–5.1)
POTASSIUM BLD-SCNC: 4.3 MMOL/L (ref 3.5–5.1)
POTASSIUM BLD-SCNC: 4.4 MMOL/L (ref 3.5–5.1)
POTASSIUM BLD-SCNC: 4.7 MMOL/L (ref 3.5–5.1)
POTASSIUM BLD-SCNC: 5.1 MMOL/L (ref 3.5–5.1)
POTASSIUM BLD-SCNC: 5.3 MMOL/L (ref 3.5–5.1)
POTASSIUM BLD-SCNC: 5.6 MMOL/L (ref 3.5–5.1)
POTASSIUM BLD-SCNC: 5.6 MMOL/L (ref 3.5–5.1)
POTASSIUM BLD-SCNC: 5.7 MMOL/L (ref 3.5–5.1)
POTASSIUM BLD-SCNC: 5.8 MMOL/L (ref 3.5–5.1)
POTASSIUM BLD-SCNC: 5.8 MMOL/L (ref 3.5–5.1)
POTASSIUM BLD-SCNC: 5.9 MMOL/L (ref 3.5–5.1)
POTASSIUM BLD-SCNC: 6 MMOL/L (ref 3.5–5.1)
POTASSIUM BLD-SCNC: 6 MMOL/L (ref 3.5–5.1)
POTASSIUM BLD-SCNC: 6.3 MMOL/L (ref 3.5–5.1)
POTASSIUM SERPL-SCNC: 4.3 MMOL/L (ref 3.5–5.1)
POTASSIUM SERPL-SCNC: 5.9 MMOL/L (ref 3.5–5.1)
PROT SERPL-MCNC: 5.8 G/DL (ref 6–8.4)
PROTHROMBIN TIME: 11.7 SEC (ref 9–12.5)
RBC # BLD AUTO: 4.65 M/UL (ref 4.6–6.2)
SAMPLE: ABNORMAL
SAMPLE: NORMAL
SITE: ABNORMAL
SITE: NORMAL
SITE: NORMAL
SODIUM BLD-SCNC: 137 MMOL/L (ref 136–145)
SODIUM BLD-SCNC: 138 MMOL/L (ref 136–145)
SODIUM BLD-SCNC: 139 MMOL/L (ref 136–145)
SODIUM BLD-SCNC: 140 MMOL/L (ref 136–145)
SODIUM BLD-SCNC: 140 MMOL/L (ref 136–145)
SODIUM BLD-SCNC: 141 MMOL/L (ref 136–145)
SODIUM BLD-SCNC: 142 MMOL/L (ref 136–145)
SODIUM BLD-SCNC: 143 MMOL/L (ref 136–145)
SODIUM BLD-SCNC: 144 MMOL/L (ref 136–145)
SODIUM SERPL-SCNC: 138 MMOL/L (ref 136–145)
SODIUM SERPL-SCNC: 141 MMOL/L (ref 136–145)
SP02: 100
SP02: 100
SP02: 97
SP02: 97
SP02: 99
SP02: 99
VT: 500
WBC # BLD AUTO: 34.42 K/UL (ref 3.9–12.7)

## 2022-07-22 PROCEDURE — 27201015 HC HEMO-CONCENTRATOR

## 2022-07-22 PROCEDURE — D9220A PRA ANESTHESIA: ICD-10-PCS | Mod: ,,, | Performed by: STUDENT IN AN ORGANIZED HEALTH CARE EDUCATION/TRAINING PROGRAM

## 2022-07-22 PROCEDURE — 36000713 HC OR TIME LEV V EA ADD 15 MIN: Performed by: THORACIC SURGERY (CARDIOTHORACIC VASCULAR SURGERY)

## 2022-07-22 PROCEDURE — 27000175 HC ADULT BYPASS PUMP

## 2022-07-22 PROCEDURE — 63600175 PHARM REV CODE 636 W HCPCS: Performed by: STUDENT IN AN ORGANIZED HEALTH CARE EDUCATION/TRAINING PROGRAM

## 2022-07-22 PROCEDURE — 25000242 PHARM REV CODE 250 ALT 637 W/ HCPCS: Performed by: NURSE PRACTITIONER

## 2022-07-22 PROCEDURE — 25000003 PHARM REV CODE 250: Performed by: THORACIC SURGERY (CARDIOTHORACIC VASCULAR SURGERY)

## 2022-07-22 PROCEDURE — 83605 ASSAY OF LACTIC ACID: CPT

## 2022-07-22 PROCEDURE — 93010 ELECTROCARDIOGRAM REPORT: CPT | Mod: ,,, | Performed by: INTERNAL MEDICINE

## 2022-07-22 PROCEDURE — 33641 REPAIR HEART SEPTUM DEFECT: CPT | Mod: 51,,, | Performed by: THORACIC SURGERY (CARDIOTHORACIC VASCULAR SURGERY)

## 2022-07-22 PROCEDURE — 93312 ECHO TRANSESOPHAGEAL: CPT | Mod: 26,59,, | Performed by: STUDENT IN AN ORGANIZED HEALTH CARE EDUCATION/TRAINING PROGRAM

## 2022-07-22 PROCEDURE — 86901 BLOOD TYPING SEROLOGIC RH(D): CPT | Performed by: NURSE PRACTITIONER

## 2022-07-22 PROCEDURE — 36556 PR INSERT NON-TUNNEL CV CATH 5+ YRS OLD: ICD-10-PCS | Mod: 59,,, | Performed by: STUDENT IN AN ORGANIZED HEALTH CARE EDUCATION/TRAINING PROGRAM

## 2022-07-22 PROCEDURE — 80053 COMPREHEN METABOLIC PANEL: CPT

## 2022-07-22 PROCEDURE — 36592 COLLECT BLOOD FROM PICC: CPT

## 2022-07-22 PROCEDURE — 25000003 PHARM REV CODE 250: Performed by: STUDENT IN AN ORGANIZED HEALTH CARE EDUCATION/TRAINING PROGRAM

## 2022-07-22 PROCEDURE — 33641 PR REASD W BYPASS: ICD-10-PCS | Mod: 51,,, | Performed by: THORACIC SURGERY (CARDIOTHORACIC VASCULAR SURGERY)

## 2022-07-22 PROCEDURE — 85730 THROMBOPLASTIN TIME PARTIAL: CPT | Performed by: NURSE PRACTITIONER

## 2022-07-22 PROCEDURE — 27201423 OPTIME MED/SURG SUP & DEVICES STERILE SUPPLY: Performed by: THORACIC SURGERY (CARDIOTHORACIC VASCULAR SURGERY)

## 2022-07-22 PROCEDURE — 63600175 PHARM REV CODE 636 W HCPCS: Performed by: NURSE PRACTITIONER

## 2022-07-22 PROCEDURE — 25000003 PHARM REV CODE 250

## 2022-07-22 PROCEDURE — 85610 PROTHROMBIN TIME: CPT | Performed by: NURSE PRACTITIONER

## 2022-07-22 PROCEDURE — 37799 UNLISTED PX VASCULAR SURGERY: CPT

## 2022-07-22 PROCEDURE — 36620 PR INSERT CATH,ART,PERCUT,SHORTTERM: ICD-10-PCS | Mod: 59,,, | Performed by: STUDENT IN AN ORGANIZED HEALTH CARE EDUCATION/TRAINING PROGRAM

## 2022-07-22 PROCEDURE — 94002 VENT MGMT INPAT INIT DAY: CPT

## 2022-07-22 PROCEDURE — 36556 INSERT NON-TUNNEL CV CATH: CPT | Mod: 59,,, | Performed by: STUDENT IN AN ORGANIZED HEALTH CARE EDUCATION/TRAINING PROGRAM

## 2022-07-22 PROCEDURE — 27100171 HC OXYGEN HIGH FLOW UP TO 24 HOURS

## 2022-07-22 PROCEDURE — 33427 PR MITRALPLASTY W RADICAL RECONSTR: ICD-10-PCS | Mod: ,,, | Performed by: THORACIC SURGERY (CARDIOTHORACIC VASCULAR SURGERY)

## 2022-07-22 PROCEDURE — 64450 NJX AA&/STRD OTHER PN/BRANCH: CPT | Mod: 50,59,, | Performed by: STUDENT IN AN ORGANIZED HEALTH CARE EDUCATION/TRAINING PROGRAM

## 2022-07-22 PROCEDURE — 27100026 HC SHUNT SENSOR, TERUMO

## 2022-07-22 PROCEDURE — 99900017 HC EXTUBATION W/PARAMETERS (STAT)

## 2022-07-22 PROCEDURE — 63600175 PHARM REV CODE 636 W HCPCS: Mod: JG

## 2022-07-22 PROCEDURE — 93005 ELECTROCARDIOGRAM TRACING: CPT

## 2022-07-22 PROCEDURE — 27202608 HC CANNULA, MISC

## 2022-07-22 PROCEDURE — 99223 1ST HOSP IP/OBS HIGH 75: CPT | Mod: ,,, | Performed by: NURSE PRACTITIONER

## 2022-07-22 PROCEDURE — 27100088 HC CELL SAVER

## 2022-07-22 PROCEDURE — 80048 BASIC METABOLIC PNL TOTAL CA: CPT | Mod: XB

## 2022-07-22 PROCEDURE — 99291 CRITICAL CARE FIRST HOUR: CPT | Mod: ,,, | Performed by: ANESTHESIOLOGY

## 2022-07-22 PROCEDURE — 33427 REPAIR OF MITRAL VALVE: CPT | Mod: ,,, | Performed by: THORACIC SURGERY (CARDIOTHORACIC VASCULAR SURGERY)

## 2022-07-22 PROCEDURE — 27000445 HC TEMPORARY PACEMAKER LEADS

## 2022-07-22 PROCEDURE — 82330 ASSAY OF CALCIUM: CPT

## 2022-07-22 PROCEDURE — 25000003 PHARM REV CODE 250: Performed by: NURSE PRACTITIONER

## 2022-07-22 PROCEDURE — 27201037 HC PRESSURE MONITORING SET UP

## 2022-07-22 PROCEDURE — 64450 PR NERVE BLOCK INJ, ANES/STEROID, OTHER PERIPHERAL: ICD-10-PCS | Mod: 50,59,, | Performed by: STUDENT IN AN ORGANIZED HEALTH CARE EDUCATION/TRAINING PROGRAM

## 2022-07-22 PROCEDURE — 94150 VITAL CAPACITY TEST: CPT

## 2022-07-22 PROCEDURE — 76937 US GUIDE VASCULAR ACCESS: CPT | Mod: 26,,, | Performed by: STUDENT IN AN ORGANIZED HEALTH CARE EDUCATION/TRAINING PROGRAM

## 2022-07-22 PROCEDURE — 93325 DOPPLER ECHO COLOR FLOW MAPG: CPT | Mod: 26,59,, | Performed by: STUDENT IN AN ORGANIZED HEALTH CARE EDUCATION/TRAINING PROGRAM

## 2022-07-22 PROCEDURE — 76937 PR  US GUIDE, VASCULAR ACCESS: ICD-10-PCS | Mod: 26,,, | Performed by: STUDENT IN AN ORGANIZED HEALTH CARE EDUCATION/TRAINING PROGRAM

## 2022-07-22 PROCEDURE — 27800595 HC HEART VALVES

## 2022-07-22 PROCEDURE — D9220A PRA ANESTHESIA: Mod: ,,, | Performed by: STUDENT IN AN ORGANIZED HEALTH CARE EDUCATION/TRAINING PROGRAM

## 2022-07-22 PROCEDURE — 84100 ASSAY OF PHOSPHORUS: CPT | Mod: 91 | Performed by: THORACIC SURGERY (CARDIOTHORACIC VASCULAR SURGERY)

## 2022-07-22 PROCEDURE — 85610 PROTHROMBIN TIME: CPT

## 2022-07-22 PROCEDURE — 93010 EKG 12-LEAD: ICD-10-PCS | Mod: ,,, | Performed by: INTERNAL MEDICINE

## 2022-07-22 PROCEDURE — 83735 ASSAY OF MAGNESIUM: CPT | Mod: 91 | Performed by: THORACIC SURGERY (CARDIOTHORACIC VASCULAR SURGERY)

## 2022-07-22 PROCEDURE — 27000191 HC C-V MONITORING

## 2022-07-22 PROCEDURE — 85520 HEPARIN ASSAY: CPT

## 2022-07-22 PROCEDURE — 84295 ASSAY OF SERUM SODIUM: CPT

## 2022-07-22 PROCEDURE — 37000009 HC ANESTHESIA EA ADD 15 MINS: Performed by: THORACIC SURGERY (CARDIOTHORACIC VASCULAR SURGERY)

## 2022-07-22 PROCEDURE — 85014 HEMATOCRIT: CPT

## 2022-07-22 PROCEDURE — 99223 PR INITIAL HOSPITAL CARE,LEVL III: ICD-10-PCS | Mod: ,,, | Performed by: NURSE PRACTITIONER

## 2022-07-22 PROCEDURE — 27800903 OPTIME MED/SURG SUP & DEVICES OTHER IMPLANTS: Performed by: THORACIC SURGERY (CARDIOTHORACIC VASCULAR SURGERY)

## 2022-07-22 PROCEDURE — 82803 BLOOD GASES ANY COMBINATION: CPT

## 2022-07-22 PROCEDURE — 99291 PR CRITICAL CARE, E/M 30-74 MINUTES: ICD-10-PCS | Mod: ,,, | Performed by: ANESTHESIOLOGY

## 2022-07-22 PROCEDURE — 76937 US GUIDE VASCULAR ACCESS: CPT | Performed by: STUDENT IN AN ORGANIZED HEALTH CARE EDUCATION/TRAINING PROGRAM

## 2022-07-22 PROCEDURE — 93325 PR DOPPLER COLOR FLOW VELOCITY MAP: ICD-10-PCS | Mod: 26,59,, | Performed by: STUDENT IN AN ORGANIZED HEALTH CARE EDUCATION/TRAINING PROGRAM

## 2022-07-22 PROCEDURE — P9045 ALBUMIN (HUMAN), 5%, 250 ML: HCPCS | Mod: JG

## 2022-07-22 PROCEDURE — 36620 INSERTION CATHETER ARTERY: CPT | Mod: 59,,, | Performed by: STUDENT IN AN ORGANIZED HEALTH CARE EDUCATION/TRAINING PROGRAM

## 2022-07-22 PROCEDURE — 94640 AIRWAY INHALATION TREATMENT: CPT

## 2022-07-22 PROCEDURE — 27200953 HC CARDIOPLEGIA SYSTEM

## 2022-07-22 PROCEDURE — 93320 PR DOPPLER ECHO HEART,COMPLETE: ICD-10-PCS | Mod: 26,59,, | Performed by: STUDENT IN AN ORGANIZED HEALTH CARE EDUCATION/TRAINING PROGRAM

## 2022-07-22 PROCEDURE — 93320 DOPPLER ECHO COMPLETE: CPT | Mod: 26,59,, | Performed by: STUDENT IN AN ORGANIZED HEALTH CARE EDUCATION/TRAINING PROGRAM

## 2022-07-22 PROCEDURE — 94010 BREATHING CAPACITY TEST: CPT

## 2022-07-22 PROCEDURE — 36000712 HC OR TIME LEV V 1ST 15 MIN: Performed by: THORACIC SURGERY (CARDIOTHORACIC VASCULAR SURGERY)

## 2022-07-22 PROCEDURE — C1729 CATH, DRAINAGE: HCPCS | Performed by: THORACIC SURGERY (CARDIOTHORACIC VASCULAR SURGERY)

## 2022-07-22 PROCEDURE — 86900 BLOOD TYPING SEROLOGIC ABO: CPT | Performed by: NURSE PRACTITIONER

## 2022-07-22 PROCEDURE — 93312 TEE: ICD-10-PCS | Mod: 26,59,, | Performed by: STUDENT IN AN ORGANIZED HEALTH CARE EDUCATION/TRAINING PROGRAM

## 2022-07-22 PROCEDURE — 85025 COMPLETE CBC W/AUTO DIFF WBC: CPT | Performed by: NURSE PRACTITIONER

## 2022-07-22 PROCEDURE — 99900035 HC TECH TIME PER 15 MIN (STAT)

## 2022-07-22 PROCEDURE — 84100 ASSAY OF PHOSPHORUS: CPT

## 2022-07-22 PROCEDURE — C9248 INJ, CLEVIDIPINE BUTYRATE: HCPCS | Mod: JG

## 2022-07-22 PROCEDURE — 83735 ASSAY OF MAGNESIUM: CPT

## 2022-07-22 PROCEDURE — 27000221 HC OXYGEN, UP TO 24 HOURS

## 2022-07-22 PROCEDURE — 84132 ASSAY OF SERUM POTASSIUM: CPT

## 2022-07-22 PROCEDURE — 37000008 HC ANESTHESIA 1ST 15 MINUTES: Performed by: THORACIC SURGERY (CARDIOTHORACIC VASCULAR SURGERY)

## 2022-07-22 PROCEDURE — 94761 N-INVAS EAR/PLS OXIMETRY MLT: CPT

## 2022-07-22 PROCEDURE — 82800 BLOOD PH: CPT

## 2022-07-22 PROCEDURE — 20000000 HC ICU ROOM

## 2022-07-22 PROCEDURE — 63600175 PHARM REV CODE 636 W HCPCS

## 2022-07-22 PROCEDURE — 99900026 HC AIRWAY MAINTENANCE (STAT)

## 2022-07-22 DEVICE — IMPLANTABLE DEVICE: Type: IMPLANTABLE DEVICE | Site: HEART | Status: FUNCTIONAL

## 2022-07-22 DEVICE — PUTTY HEMASORB BONE RESORBABLE: Type: IMPLANTABLE DEVICE | Site: CHEST  WALL | Status: FUNCTIONAL

## 2022-07-22 RX ORDER — ACETAMINOPHEN 500 MG
1000 TABLET ORAL EVERY 8 HOURS
Status: DISCONTINUED | OUTPATIENT
Start: 2022-07-22 | End: 2022-07-25

## 2022-07-22 RX ORDER — METOCLOPRAMIDE HYDROCHLORIDE 5 MG/ML
5 INJECTION INTRAMUSCULAR; INTRAVENOUS EVERY 6 HOURS PRN
Status: DISCONTINUED | OUTPATIENT
Start: 2022-07-22 | End: 2022-07-26

## 2022-07-22 RX ORDER — LIDOCAINE HYDROCHLORIDE 20 MG/ML
INJECTION, SOLUTION EPIDURAL; INFILTRATION; INTRACAUDAL; PERINEURAL
Status: DISCONTINUED | OUTPATIENT
Start: 2022-07-22 | End: 2022-07-22

## 2022-07-22 RX ORDER — HYDROMORPHONE HYDROCHLORIDE 1 MG/ML
1 INJECTION, SOLUTION INTRAMUSCULAR; INTRAVENOUS; SUBCUTANEOUS
Status: DISCONTINUED | OUTPATIENT
Start: 2022-07-22 | End: 2022-07-25

## 2022-07-22 RX ORDER — PROTAMINE SULFATE 10 MG/ML
INJECTION, SOLUTION INTRAVENOUS
Status: DISCONTINUED | OUTPATIENT
Start: 2022-07-22 | End: 2022-07-22

## 2022-07-22 RX ORDER — POTASSIUM CHLORIDE 14.9 MG/ML
60 INJECTION INTRAVENOUS
Status: DISCONTINUED | OUTPATIENT
Start: 2022-07-22 | End: 2022-07-26

## 2022-07-22 RX ORDER — BUPIVACAINE HYDROCHLORIDE 5 MG/ML
INJECTION, SOLUTION EPIDURAL; INTRACAUDAL
Status: COMPLETED | OUTPATIENT
Start: 2022-07-22 | End: 2022-07-22

## 2022-07-22 RX ORDER — ATORVASTATIN CALCIUM 20 MG/1
40 TABLET, FILM COATED ORAL DAILY
Status: DISCONTINUED | OUTPATIENT
Start: 2022-07-22 | End: 2022-07-27 | Stop reason: HOSPADM

## 2022-07-22 RX ORDER — DOCUSATE SODIUM 100 MG/1
100 CAPSULE, LIQUID FILLED ORAL 2 TIMES DAILY
Status: DISCONTINUED | OUTPATIENT
Start: 2022-07-22 | End: 2022-07-27 | Stop reason: HOSPADM

## 2022-07-22 RX ORDER — OXYCODONE HYDROCHLORIDE 5 MG/1
5 TABLET ORAL EVERY 4 HOURS PRN
Status: DISCONTINUED | OUTPATIENT
Start: 2022-07-22 | End: 2022-07-27 | Stop reason: HOSPADM

## 2022-07-22 RX ORDER — IPRATROPIUM BROMIDE AND ALBUTEROL SULFATE 2.5; .5 MG/3ML; MG/3ML
3 SOLUTION RESPIRATORY (INHALATION) EVERY 4 HOURS
Status: COMPLETED | OUTPATIENT
Start: 2022-07-22 | End: 2022-07-23

## 2022-07-22 RX ORDER — ASPIRIN 300 MG/1
300 SUPPOSITORY RECTAL ONCE AS NEEDED
Status: DISCONTINUED | OUTPATIENT
Start: 2022-07-22 | End: 2022-07-22

## 2022-07-22 RX ORDER — SODIUM CHLORIDE 9 MG/ML
INJECTION, SOLUTION INTRAVENOUS CONTINUOUS
Status: DISCONTINUED | OUTPATIENT
Start: 2022-07-22 | End: 2022-07-22

## 2022-07-22 RX ORDER — TRANEXAMIC ACID 100 MG/ML
INJECTION, SOLUTION INTRAVENOUS
Status: DISCONTINUED | OUTPATIENT
Start: 2022-07-22 | End: 2022-07-22

## 2022-07-22 RX ORDER — CEFAZOLIN SODIUM/D5W 2 G/100 ML
2 PLASTIC BAG, INJECTION (ML) INTRAVENOUS
Status: COMPLETED | OUTPATIENT
Start: 2022-07-22 | End: 2022-07-24

## 2022-07-22 RX ORDER — DEXTROSE MONOHYDRATE, SODIUM CHLORIDE, AND POTASSIUM CHLORIDE 50; 1.49; 4.5 G/1000ML; G/1000ML; G/1000ML
INJECTION, SOLUTION INTRAVENOUS CONTINUOUS
Status: DISCONTINUED | OUTPATIENT
Start: 2022-07-22 | End: 2022-07-25

## 2022-07-22 RX ORDER — KETAMINE HCL IN 0.9 % NACL 50 MG/5 ML
SYRINGE (ML) INTRAVENOUS
Status: DISCONTINUED | OUTPATIENT
Start: 2022-07-22 | End: 2022-07-22

## 2022-07-22 RX ORDER — ONDANSETRON 2 MG/ML
4 INJECTION INTRAMUSCULAR; INTRAVENOUS EVERY 12 HOURS PRN
Status: DISCONTINUED | OUTPATIENT
Start: 2022-07-22 | End: 2022-07-27 | Stop reason: HOSPADM

## 2022-07-22 RX ORDER — ACETAMINOPHEN 325 MG/1
650 TABLET ORAL EVERY 4 HOURS PRN
Status: DISCONTINUED | OUTPATIENT
Start: 2022-07-22 | End: 2022-07-22

## 2022-07-22 RX ORDER — FAMOTIDINE 10 MG/ML
20 INJECTION INTRAVENOUS 2 TIMES DAILY
Status: DISCONTINUED | OUTPATIENT
Start: 2022-07-22 | End: 2022-07-25

## 2022-07-22 RX ORDER — DEXMEDETOMIDINE HYDROCHLORIDE 4 UG/ML
0-1.4 INJECTION, SOLUTION INTRAVENOUS CONTINUOUS
Status: DISCONTINUED | OUTPATIENT
Start: 2022-07-22 | End: 2022-07-23

## 2022-07-22 RX ORDER — HYDROMORPHONE HYDROCHLORIDE 2 MG/ML
2 INJECTION, SOLUTION INTRAMUSCULAR; INTRAVENOUS; SUBCUTANEOUS
Status: DISCONTINUED | OUTPATIENT
Start: 2022-07-22 | End: 2022-07-25

## 2022-07-22 RX ORDER — IPRATROPIUM BROMIDE AND ALBUTEROL SULFATE 2.5; .5 MG/3ML; MG/3ML
3 SOLUTION RESPIRATORY (INHALATION) EVERY 4 HOURS PRN
Status: DISCONTINUED | OUTPATIENT
Start: 2022-07-22 | End: 2022-07-23

## 2022-07-22 RX ORDER — FENTANYL CITRATE 50 UG/ML
INJECTION, SOLUTION INTRAMUSCULAR; INTRAVENOUS
Status: DISCONTINUED | OUTPATIENT
Start: 2022-07-22 | End: 2022-07-22

## 2022-07-22 RX ORDER — CALCIUM GLUCONATE 20 MG/ML
1 INJECTION, SOLUTION INTRAVENOUS ONCE
Status: COMPLETED | OUTPATIENT
Start: 2022-07-22 | End: 2022-07-22

## 2022-07-22 RX ORDER — ALBUMIN HUMAN 50 G/1000ML
25 SOLUTION INTRAVENOUS ONCE
Status: COMPLETED | OUTPATIENT
Start: 2022-07-22 | End: 2022-07-22

## 2022-07-22 RX ORDER — NITROGLYCERIN 5 MG/ML
INJECTION, SOLUTION INTRAVENOUS
Status: DISCONTINUED | OUTPATIENT
Start: 2022-07-22 | End: 2022-07-22

## 2022-07-22 RX ORDER — PROPOFOL 10 MG/ML
0-50 INJECTION, EMULSION INTRAVENOUS CONTINUOUS
Status: DISCONTINUED | OUTPATIENT
Start: 2022-07-22 | End: 2022-07-23

## 2022-07-22 RX ORDER — MUPIROCIN 20 MG/G
1 OINTMENT TOPICAL
Status: COMPLETED | OUTPATIENT
Start: 2022-07-22 | End: 2022-07-22

## 2022-07-22 RX ORDER — ASPIRIN 325 MG
325 TABLET ORAL DAILY
Status: DISCONTINUED | OUTPATIENT
Start: 2022-07-22 | End: 2022-07-22

## 2022-07-22 RX ORDER — FLUTICASONE FUROATE AND VILANTEROL 200; 25 UG/1; UG/1
1 POWDER RESPIRATORY (INHALATION) DAILY
Status: DISCONTINUED | OUTPATIENT
Start: 2022-07-22 | End: 2022-07-27 | Stop reason: HOSPADM

## 2022-07-22 RX ORDER — FUROSEMIDE 10 MG/ML
INJECTION INTRAMUSCULAR; INTRAVENOUS
Status: DISCONTINUED | OUTPATIENT
Start: 2022-07-22 | End: 2022-07-22

## 2022-07-22 RX ORDER — PROPOFOL 10 MG/ML
VIAL (ML) INTRAVENOUS
Status: DISCONTINUED | OUTPATIENT
Start: 2022-07-22 | End: 2022-07-22

## 2022-07-22 RX ORDER — MAGNESIUM SULFATE HEPTAHYDRATE 40 MG/ML
4 INJECTION, SOLUTION INTRAVENOUS
Status: DISCONTINUED | OUTPATIENT
Start: 2022-07-22 | End: 2022-07-26

## 2022-07-22 RX ORDER — FENTANYL CITRATE 50 UG/ML
25 INJECTION, SOLUTION INTRAMUSCULAR; INTRAVENOUS
Status: DISCONTINUED | OUTPATIENT
Start: 2022-07-22 | End: 2022-07-22

## 2022-07-22 RX ORDER — ONDANSETRON 2 MG/ML
INJECTION INTRAMUSCULAR; INTRAVENOUS
Status: DISCONTINUED | OUTPATIENT
Start: 2022-07-22 | End: 2022-07-22

## 2022-07-22 RX ORDER — ROCURONIUM BROMIDE 10 MG/ML
INJECTION, SOLUTION INTRAVENOUS
Status: DISCONTINUED | OUTPATIENT
Start: 2022-07-22 | End: 2022-07-22

## 2022-07-22 RX ORDER — PROPOFOL 10 MG/ML
VIAL (ML) INTRAVENOUS CONTINUOUS PRN
Status: DISCONTINUED | OUTPATIENT
Start: 2022-07-22 | End: 2022-07-22

## 2022-07-22 RX ORDER — MIDAZOLAM HYDROCHLORIDE 1 MG/ML
INJECTION, SOLUTION INTRAMUSCULAR; INTRAVENOUS
Status: DISCONTINUED | OUTPATIENT
Start: 2022-07-22 | End: 2022-07-22

## 2022-07-22 RX ORDER — TRANEXAMIC ACID 100 MG/ML
INJECTION, SOLUTION INTRAVENOUS CONTINUOUS PRN
Status: DISCONTINUED | OUTPATIENT
Start: 2022-07-22 | End: 2022-07-22

## 2022-07-22 RX ORDER — SODIUM CHLORIDE 0.9 % (FLUSH) 0.9 %
10 SYRINGE (ML) INJECTION
Status: DISCONTINUED | OUTPATIENT
Start: 2022-07-22 | End: 2022-07-26

## 2022-07-22 RX ORDER — METOPROLOL TARTRATE 25 MG/1
25 TABLET, FILM COATED ORAL
Status: COMPLETED | OUTPATIENT
Start: 2022-07-22 | End: 2022-07-22

## 2022-07-22 RX ORDER — HALOPERIDOL 5 MG/ML
INJECTION INTRAMUSCULAR
Status: DISCONTINUED | OUTPATIENT
Start: 2022-07-22 | End: 2022-07-22

## 2022-07-22 RX ORDER — BUPIVACAINE HYDROCHLORIDE 5 MG/ML
INJECTION, SOLUTION EPIDURAL; INTRACAUDAL
Status: COMPLETED
Start: 2022-07-22 | End: 2022-07-22

## 2022-07-22 RX ORDER — MUPIROCIN 20 MG/G
1 OINTMENT TOPICAL 2 TIMES DAILY
Status: DISCONTINUED | OUTPATIENT
Start: 2022-07-22 | End: 2022-07-27 | Stop reason: HOSPADM

## 2022-07-22 RX ORDER — ALBUMIN HUMAN 50 G/1000ML
25 SOLUTION INTRAVENOUS ONCE AS NEEDED
Status: DISCONTINUED | OUTPATIENT
Start: 2022-07-22 | End: 2022-07-26

## 2022-07-22 RX ORDER — OXYCODONE HYDROCHLORIDE 10 MG/1
10 TABLET ORAL EVERY 4 HOURS PRN
Status: DISCONTINUED | OUTPATIENT
Start: 2022-07-22 | End: 2022-07-27 | Stop reason: HOSPADM

## 2022-07-22 RX ORDER — HEPARIN SODIUM 1000 [USP'U]/ML
INJECTION, SOLUTION INTRAVENOUS; SUBCUTANEOUS
Status: DISCONTINUED | OUTPATIENT
Start: 2022-07-22 | End: 2022-07-22

## 2022-07-22 RX ORDER — POTASSIUM CHLORIDE 29.8 MG/ML
40 INJECTION INTRAVENOUS
Status: DISCONTINUED | OUTPATIENT
Start: 2022-07-22 | End: 2022-07-26

## 2022-07-22 RX ORDER — POTASSIUM CHLORIDE 14.9 MG/ML
20 INJECTION INTRAVENOUS
Status: DISCONTINUED | OUTPATIENT
Start: 2022-07-22 | End: 2022-07-26

## 2022-07-22 RX ORDER — MAGNESIUM SULFATE HEPTAHYDRATE 40 MG/ML
2 INJECTION, SOLUTION INTRAVENOUS
Status: DISCONTINUED | OUTPATIENT
Start: 2022-07-22 | End: 2022-07-26

## 2022-07-22 RX ORDER — FENTANYL CITRATE 50 UG/ML
50 INJECTION, SOLUTION INTRAMUSCULAR; INTRAVENOUS
Status: DISCONTINUED | OUTPATIENT
Start: 2022-07-24 | End: 2022-07-22

## 2022-07-22 RX ORDER — BISACODYL 10 MG
10 SUPPOSITORY, RECTAL RECTAL DAILY PRN
Status: DISCONTINUED | OUTPATIENT
Start: 2022-07-22 | End: 2022-07-27 | Stop reason: HOSPADM

## 2022-07-22 RX ORDER — POLYETHYLENE GLYCOL 3350 17 G/17G
17 POWDER, FOR SOLUTION ORAL DAILY
Status: DISCONTINUED | OUTPATIENT
Start: 2022-07-22 | End: 2022-07-27 | Stop reason: HOSPADM

## 2022-07-22 RX ORDER — CALCIUM GLUCONATE 20 MG/ML
1 INJECTION, SOLUTION INTRAVENOUS EVERY 10 MIN PRN
Status: DISCONTINUED | OUTPATIENT
Start: 2022-07-22 | End: 2022-07-26

## 2022-07-22 RX ORDER — CEFAZOLIN SODIUM/WATER 2 G/20 ML
2 SYRINGE (ML) INTRAVENOUS
Status: DISCONTINUED | OUTPATIENT
Start: 2022-07-22 | End: 2022-07-22

## 2022-07-22 RX ORDER — ASPIRIN 325 MG
325 TABLET ORAL ONCE
Status: DISCONTINUED | OUTPATIENT
Start: 2022-07-22 | End: 2022-07-22

## 2022-07-22 RX ORDER — CEFAZOLIN SODIUM 1 G/3ML
INJECTION, POWDER, FOR SOLUTION INTRAMUSCULAR; INTRAVENOUS
Status: DISCONTINUED | OUTPATIENT
Start: 2022-07-22 | End: 2022-07-22

## 2022-07-22 RX ORDER — ASPIRIN 325 MG
325 TABLET ORAL DAILY
Status: DISCONTINUED | OUTPATIENT
Start: 2022-07-22 | End: 2022-07-27 | Stop reason: HOSPADM

## 2022-07-22 RX ADMIN — ROCURONIUM BROMIDE 100 MG: 10 INJECTION, SOLUTION INTRAVENOUS at 07:07

## 2022-07-22 RX ADMIN — NOREPINEPHRINE BITARTRATE 0.04 MCG/KG/MIN: 1 INJECTION, SOLUTION, CONCENTRATE INTRAVENOUS at 12:07

## 2022-07-22 RX ADMIN — SUGAMMADEX 200 MG: 100 INJECTION, SOLUTION INTRAVENOUS at 02:07

## 2022-07-22 RX ADMIN — PROPOFOL 45 MCG/KG/MIN: 10 INJECTION, EMULSION INTRAVENOUS at 03:07

## 2022-07-22 RX ADMIN — CALCIUM CHLORIDE 1 G: 100 INJECTION, SOLUTION INTRAVENOUS at 11:07

## 2022-07-22 RX ADMIN — IPRATROPIUM BROMIDE AND ALBUTEROL SULFATE 3 ML: .5; 3 SOLUTION RESPIRATORY (INHALATION) at 03:07

## 2022-07-22 RX ADMIN — SODIUM CHLORIDE, SODIUM GLUCONATE, SODIUM ACETATE, POTASSIUM CHLORIDE, MAGNESIUM CHLORIDE, SODIUM PHOSPHATE, DIBASIC, AND POTASSIUM PHOSPHATE: .53; .5; .37; .037; .03; .012; .00082 INJECTION, SOLUTION INTRAVENOUS at 07:07

## 2022-07-22 RX ADMIN — MUPIROCIN 1 G: 20 OINTMENT TOPICAL at 06:07

## 2022-07-22 RX ADMIN — FUROSEMIDE 40 MG: 10 INJECTION, SOLUTION INTRAMUSCULAR; INTRAVENOUS at 11:07

## 2022-07-22 RX ADMIN — FAMOTIDINE 20 MG: 10 INJECTION, SOLUTION INTRAVENOUS at 08:07

## 2022-07-22 RX ADMIN — DOCUSATE SODIUM 100 MG: 100 CAPSULE ORAL at 08:07

## 2022-07-22 RX ADMIN — NITROGLYCERIN 100 MCG: 5 INJECTION, SOLUTION INTRAVENOUS at 08:07

## 2022-07-22 RX ADMIN — FENTANYL CITRATE 25 MCG: 50 INJECTION INTRAMUSCULAR; INTRAVENOUS at 03:07

## 2022-07-22 RX ADMIN — CEFAZOLIN 2 G: 330 INJECTION, POWDER, FOR SOLUTION INTRAMUSCULAR; INTRAVENOUS at 12:07

## 2022-07-22 RX ADMIN — PROPOFOL 50 MG: 10 INJECTION, EMULSION INTRAVENOUS at 12:07

## 2022-07-22 RX ADMIN — ROCURONIUM BROMIDE 20 MG: 10 INJECTION, SOLUTION INTRAVENOUS at 11:07

## 2022-07-22 RX ADMIN — INSULIN HUMAN 10 UNITS: 100 INJECTION, SOLUTION PARENTERAL at 05:07

## 2022-07-22 RX ADMIN — CEFAZOLIN 2 G: 330 INJECTION, POWDER, FOR SOLUTION INTRAMUSCULAR; INTRAVENOUS at 08:07

## 2022-07-22 RX ADMIN — LIDOCAINE HYDROCHLORIDE 100 MG: 20 INJECTION, SOLUTION EPIDURAL; INFILTRATION; INTRACAUDAL; PERINEURAL at 11:07

## 2022-07-22 RX ADMIN — VASOPRESSIN 4 UNITS: 20 INJECTION INTRAVENOUS at 12:07

## 2022-07-22 RX ADMIN — ROCURONIUM BROMIDE 30 MG: 10 INJECTION, SOLUTION INTRAVENOUS at 08:07

## 2022-07-22 RX ADMIN — ROCURONIUM BROMIDE 20 MG: 10 INJECTION, SOLUTION INTRAVENOUS at 09:07

## 2022-07-22 RX ADMIN — TRANEXAMIC ACID 1347 MG: 100 INJECTION, SOLUTION INTRAVENOUS at 07:07

## 2022-07-22 RX ADMIN — HALOPERIDOL LACTATE 0.5 MG: 5 INJECTION, SOLUTION INTRAMUSCULAR at 01:07

## 2022-07-22 RX ADMIN — INSULIN HUMAN 6 UNITS: 100 INJECTION, SOLUTION PARENTERAL at 12:07

## 2022-07-22 RX ADMIN — BUPIVACAINE HYDROCHLORIDE 30 ML: 5 INJECTION, SOLUTION EPIDURAL; INTRACAUDAL; PERINEURAL at 07:07

## 2022-07-22 RX ADMIN — SODIUM CHLORIDE: 9 INJECTION, SOLUTION INTRAVENOUS at 07:07

## 2022-07-22 RX ADMIN — ONDANSETRON 4 MG: 2 INJECTION INTRAMUSCULAR; INTRAVENOUS at 01:07

## 2022-07-22 RX ADMIN — Medication 50 MG: at 07:07

## 2022-07-22 RX ADMIN — MUPIROCIN 1 G: 20 OINTMENT TOPICAL at 08:07

## 2022-07-22 RX ADMIN — LIDOCAINE HYDROCHLORIDE 100 MG: 20 INJECTION, SOLUTION EPIDURAL; INFILTRATION; INTRACAUDAL; PERINEURAL at 07:07

## 2022-07-22 RX ADMIN — CALCIUM GLUCONATE 1 G: 20 INJECTION, SOLUTION INTRAVENOUS at 05:07

## 2022-07-22 RX ADMIN — METOPROLOL TARTRATE 25 MG: 25 TABLET, FILM COATED ORAL at 06:07

## 2022-07-22 RX ADMIN — FENTANYL CITRATE 100 MCG: 50 INJECTION, SOLUTION INTRAMUSCULAR; INTRAVENOUS at 08:07

## 2022-07-22 RX ADMIN — HEPARIN SODIUM 42000 UNITS: 1000 INJECTION, SOLUTION INTRAVENOUS; SUBCUTANEOUS at 08:07

## 2022-07-22 RX ADMIN — FENTANYL CITRATE 100 MCG: 50 INJECTION, SOLUTION INTRAMUSCULAR; INTRAVENOUS at 12:07

## 2022-07-22 RX ADMIN — Medication 50 MCG/KG/MIN: at 01:07

## 2022-07-22 RX ADMIN — PROTAMINE SULFATE 5 MG: 10 INJECTION, SOLUTION INTRAVENOUS at 12:07

## 2022-07-22 RX ADMIN — SODIUM CHLORIDE 1 MG/KG/HR: 9 INJECTION, SOLUTION INTRAVENOUS at 07:07

## 2022-07-22 RX ADMIN — FENTANYL CITRATE 100 MCG: 50 INJECTION, SOLUTION INTRAMUSCULAR; INTRAVENOUS at 01:07

## 2022-07-22 RX ADMIN — ROCURONIUM BROMIDE 50 MG: 10 INJECTION, SOLUTION INTRAVENOUS at 10:07

## 2022-07-22 RX ADMIN — FENTANYL CITRATE 100 MCG: 50 INJECTION, SOLUTION INTRAMUSCULAR; INTRAVENOUS at 07:07

## 2022-07-22 RX ADMIN — ACETAMINOPHEN 1000 MG: 500 TABLET ORAL at 09:07

## 2022-07-22 RX ADMIN — EPINEPHRINE 0.06 MCG/KG/MIN: 1 INJECTION INTRAMUSCULAR; INTRAVENOUS; SUBCUTANEOUS at 11:07

## 2022-07-22 RX ADMIN — INSULIN HUMAN 3 UNITS: 100 INJECTION, SOLUTION PARENTERAL at 11:07

## 2022-07-22 RX ADMIN — Medication 2 G: at 09:07

## 2022-07-22 RX ADMIN — IPRATROPIUM BROMIDE AND ALBUTEROL SULFATE 3 ML: .5; 3 SOLUTION RESPIRATORY (INHALATION) at 07:07

## 2022-07-22 RX ADMIN — DEXTROSE 250 ML: 10 SOLUTION INTRAVENOUS at 05:07

## 2022-07-22 RX ADMIN — MIDAZOLAM HYDROCHLORIDE 2 MG: 1 INJECTION, SOLUTION INTRAMUSCULAR; INTRAVENOUS at 07:07

## 2022-07-22 RX ADMIN — OXYCODONE HYDROCHLORIDE 10 MG: 10 TABLET ORAL at 11:07

## 2022-07-22 RX ADMIN — PROPOFOL 50 MCG/KG/MIN: 10 INJECTION, EMULSION INTRAVENOUS at 02:07

## 2022-07-22 RX ADMIN — CLEVIPIDINE 1 MG/HR: 0.5 EMULSION INTRAVENOUS at 02:07

## 2022-07-22 RX ADMIN — PROPOFOL 30 MG: 10 INJECTION, EMULSION INTRAVENOUS at 08:07

## 2022-07-22 RX ADMIN — DEXTROSE, SODIUM CHLORIDE, AND POTASSIUM CHLORIDE: 5; .45; .15 INJECTION INTRAVENOUS at 02:07

## 2022-07-22 RX ADMIN — ASPIRIN 325 MG ORAL TABLET 325 MG: 325 PILL ORAL at 09:07

## 2022-07-22 RX ADMIN — ALBUMIN (HUMAN) 25 G: 12.5 SOLUTION INTRAVENOUS at 09:07

## 2022-07-22 RX ADMIN — HYDROMORPHONE HYDROCHLORIDE 1 MG: 1 INJECTION, SOLUTION INTRAMUSCULAR; INTRAVENOUS; SUBCUTANEOUS at 08:07

## 2022-07-22 RX ADMIN — ACETAMINOPHEN 1000 MG: 500 TABLET ORAL at 03:07

## 2022-07-22 RX ADMIN — ALBUMIN (HUMAN) 25 G: 12.5 SOLUTION INTRAVENOUS at 05:07

## 2022-07-22 RX ADMIN — PROTAMINE SULFATE 390 MG: 10 INJECTION, SOLUTION INTRAVENOUS at 12:07

## 2022-07-22 RX ADMIN — PROPOFOL 150 MG: 10 INJECTION, EMULSION INTRAVENOUS at 07:07

## 2022-07-22 RX ADMIN — HYDROMORPHONE HYDROCHLORIDE 1 MG: 1 INJECTION, SOLUTION INTRAMUSCULAR; INTRAVENOUS; SUBCUTANEOUS at 06:07

## 2022-07-22 RX ADMIN — DEXMEDETOMIDINE HYDROCHLORIDE 0.3 MCG/KG/HR: 4 INJECTION INTRAVENOUS at 05:07

## 2022-07-22 RX ADMIN — INSULIN HUMAN 1 UNITS/HR: 1 INJECTION, SOLUTION INTRAVENOUS at 03:07

## 2022-07-22 RX ADMIN — HEPARIN SODIUM 4000 UNITS: 1000 INJECTION, SOLUTION INTRAVENOUS; SUBCUTANEOUS at 09:07

## 2022-07-22 NOTE — SUBJECTIVE & OBJECTIVE
Interval HPI:   Overnight events: Patient admitted to the SICU in room 10667 CVICU/12577 CVICU A. Blood glucose stable. BG at and above goal on current insulin regimen (IIP). Steroid use- None. Day of Surgery  Renal function- Normal   Vasopressors-  Epinephrine       Diet NPO Except for: Sips with Medication     Eating:   NPO  Nausea: SIDNEY  Hypoglycemia and intervention: No  Fever: No  TPN and/or TF: No    PMH, PSH, FH, SH updated and reviewed     ROS:  Review of Systems  SIDNEY due to intubation.     Current Medications and/or Treatments Impacting Glycemic Control  Immunotherapy:    Immunosuppressants       None          Steroids:   Hormones (From admission, onward)                None          Pressors:    Autonomic Drugs (From admission, onward)                Start     Stop Route Frequency Ordered    07/22/22 1345  EPINEPHrine 5 mg in dextrose 5% 250 mL infusion (premix)        Question Answer Comment   Begin at (in mcg/kg/min): 0.02    Titrate by: (in mcg/kg/min) 0.02    Titrate interval: (in minutes) 5    Titrate to maintain: (SBP or MAP or Cardiac Index) MAP    Greater than: (in mmHg) 65    Cardiac index greater than: (in L/min) 2.2    Maximum dose: (in mcg/kg/min) 2        -- IV Continuous 07/22/22 1334          Hyperglycemia/Diabetes Medications:   Antihyperglycemics (From admission, onward)                Start     Stop Route Frequency Ordered    07/22/22 1345  insulin regular in 0.9 % NaCl 100 unit/100 mL (1 unit/mL) infusion        Question Answer Comment   Insulin rate changes (DO NOT MODIFY ANSWER) \\[email or web address] [email or web address] [id number]    Enter initial dose (Units/hr): 1        -- IV Continuous 07/22/22 1334             PHYSICAL EXAMINATION:  Vitals:    07/22/22 1530   BP:    Pulse: 82   Resp: (!) 26   Temp:      Body mass index is 42.62 kg/m².    Physical Exam  Constitutional: Well developed, obese, NAD.  ENT: External ears no masses with nose patent  Neck: Supple;  trachea midline  Cardiovascular: Normal heart sounds, no LE edema. DP +2 bilaterally.  Lungs: Normal effort; lungs anterior bilaterally clear to auscultation.  Intubated on a ventilator.  Abdomen: Soft, no masses, no hernias.  Hypoactive BS noted.  MS: No clubbing or cyanosis of nails noted; unable to assess gait.  Skin: No rashes, lesions, or ulcers; no nodules.  Injection sites are ok. No lipo hypertropthy or atrophy.  Mid-sternal incision with telfa island dressing, CDI.  CT x 1..  Psychiatric: SIDNEY  Neurological: SIDNEY  Foot: Nails in good condition, no amputations noted

## 2022-07-22 NOTE — HPI
Reason for Consult: Management of Hyperglycemia     Surgical Procedure and Date: s/p MVR and PFO closure     Patient is not diabetic and does not currently take any oral/injectable antidiabetic/hypoglycemic medications.     Lab Results   Component Value Date    HGBA1C 5.4 07/21/2022         HPI: Kong Celaya is a 52 y.o. male who presents for MVr and PFO closure secondary to severe mitral regurgitation.  He has a medical history significant for asthma/bronchitis, mild diffuse coronary artery disease, elevated LFT's with liver hemangioma, and recently found severe mitral regurgitation with posterior leaflet prolapse. He reports that he recently found out about his mitral valve complication. He reports life limiting dyspnea on exertion, fatigue, and swelling. He reports swelling despite diuretic initiation.  He reports no longer being able to cut the grass due to SMART. Mr. Celaya is now s/p mitral valve repair and PFO closure on 7/22/22. The procedure was performed without apparent complication, however coming off pump, he was hyperkalemic with peaked T waves. He was given insulin and 40 lasix and his last K prior to transfer to SICU was 4.7. Endocrine consulted to manage post-operative hyperglycemia.

## 2022-07-22 NOTE — ANESTHESIA PROCEDURE NOTES
TTP    Patient location during procedure: OR   Block not for primary anesthetic.  Reason for block: at surgeon's request and post-op pain management   Post-op Pain Location: Chest      Staffing  Authorizing Provider: Filiberto Ludwig MD  Performing Provider: Estela Rosario MD    Preanesthetic Checklist  Completed: patient identified, IV checked, site marked, risks and benefits discussed, surgical consent, monitors and equipment checked, pre-op evaluation and timeout performed  Peripheral Block  Patient position: supine  Prep: ChloraPrep  Patient monitoring: heart rate, continuous pulse ox, continuous capnometry, cardiac monitor and frequent blood pressure checks  Block type: Transversus thoracis  Laterality: bilateral  Injection technique: single shot  Needle  Needle type: Echogenic   Needle gauge: 20 G  Needle length: 4 in  Needle localization: anatomical landmarks     Assessment  Paresthesia pain: none  Heart rate change: no  Slow fractionated injection: yes    Medications:    Medications: bupivacaine (pf) (MARCAINE) injection 0.5% - Perineural   30 mL - 7/22/2022 7:30:00 AM

## 2022-07-22 NOTE — SUBJECTIVE & OBJECTIVE
Follow-up For: Procedure(s) (LRB):  Valvuloplasty, Mitral (N/A)    Post-Operative Day: Day of Surgery     Past Medical History:   Diagnosis Date    Arthritis     Asthma     Bronchitis     COPD (chronic obstructive pulmonary disease)     Coronary artery disease     Elevated LFTs     GERD (gastroesophageal reflux disease)     Hepatitis B core antibody positive     PATIENT UNAWARE    Hernia of abdominal wall     Hx of colonic polyps     Liver hemangioma     Renal insufficiency        Past Surgical History:   Procedure Laterality Date    CHOLECYSTECTOMY      COLONOSCOPY N/A 4/12/2018    Procedure: COLONOSCOPY;  Surgeon: Sixto Robertson MD;  Location: [facility];  Service: Endoscopy;  Laterality: N/A;    LEFT HEART CATHETERIZATION Left 5/12/2022    Procedure: Left heart cath;  Surgeon: Hung Larsen MD;  Location: [facility] CATH;  Service: Cardiovascular;  Laterality: Left;    TONSILLECTOMY      TRANSESOPHAGEAL ECHOCARDIOGRAPHY N/A 5/12/2022    Procedure: ECHOCARDIOGRAM, TRANSESOPHAGEAL;  Surgeon: Hung Larsen MD;  Location: [facility] CATH;  Service: Cardiovascular;  Laterality: N/A;    UMBILICAL HERNIA REPAIR      UPPER GASTROINTESTINAL ENDOSCOPY         Review of patient's allergies indicates:   Allergen Reactions    Adhesive Other (See Comments)     Blisters        Family History       Problem Relation (Age of Onset)    Diabetes Mother          Tobacco Use    Smoking status: Current Every Day Smoker     Packs/day: 0.50     Years: 25.00     Pack years: 12.50    Smokeless tobacco: Never Used   Substance and Sexual Activity    Alcohol use: Yes     Alcohol/week: 0.0 standard drinks     Comment: WEEKLY    Drug use: Not Currently     Types: Marijuana    Sexual activity: Yes     Partners: Female      Review of Systems   Reason unable to perform ROS: Intubated.   Objective:     Vital Signs (Most Recent):  Temp: 98.7 °F (37.1 °C) (07/22/22 1400)  Pulse: 83 (07/22/22 1411)  Resp: 20 (07/22/22 0614)  BP: 138/78 (07/22/22  0658)  SpO2: 100 % (07/22/22 1411) Vital Signs (24h Range):  Temp:  [97.7 °F (36.5 °C)-98.7 °F (37.1 °C)] 98.7 °F (37.1 °C)  Pulse:  [71-83] 83  Resp:  [18-20] 20  SpO2:  [96 %-100 %] 100 %  BP: (131-138)/(76-86) 138/78     Weight: 134.7 kg (297 lb)  Body mass index is 42.62 kg/m².      Intake/Output Summary (Last 24 hours) at 7/22/2022 1415  Last data filed at 7/22/2022 1400  Gross per 24 hour   Intake 2500 ml   Output 1518 ml   Net 982 ml       Physical Exam  Constitutional:       Comments: Intubated and sedated   HENT:      Head: Normocephalic and atraumatic.   Neck:      Comments: Right IJ CVC  Cardiovascular:      Rate and Rhythm: Normal rate and regular rhythm.      Comments: V-wires  Meds x2  Midline sternotomy incision  Pulmonary:      Comments: Intubated  Abdominal:      General: There is no distension.      Palpations: Abdomen is soft.   Genitourinary:     Comments: [place] with pink urine  Musculoskeletal:      Comments: L ulnar art line   Neurological:      Comments: Sedated       Vents:  Vent Mode: A/C (07/22/22 1411)  Ventilator Initiated: Yes (07/22/22 1403)  Set Rate: 26 BPM (07/22/22 1411)  Vt Set: 500 mL (07/22/22 1411)  PEEP/CPAP: 13 cmH20 (07/22/22 1411)  Oxygen Concentration (%): 100 (07/22/22 1411)  Peak Airway Pressure: 29 cmH2O (07/22/22 1411)  Plateau Pressure: 25 cmH20 (07/22/22 1411)  Total Ve: 13 mL (07/22/22 1411)  Negative Inspiratory Force (cm H2O): 0 (07/22/22 1411)    Lines/Drains/Airways       Central Venous Catheter Line  Duration             Percutaneous Central Line Insertion/Assessment - Double Lumen  07/22/22 1038 right internal jugular <1 day              Drain  Duration                  Urethral Catheter 07/22/22 0740 Non-latex;Temperature probe;Straight-tip 14 Fr. <1 day         Y Chest Tube 1 and 2 07/22/22 1301 1 Right Mediastinal 19 Fr. 2 Left Mediastinal 19 Fr. <1 day              Airway  Duration                  Airway - Non-Surgical 07/22/22 0727 <1 day               Arterial Line  Duration             Arterial Line 07/22/22 1031 Left Other (Comment) <1 day              Line  Duration                  Pacer Wires 07/22/22 1300 <1 day              Peripheral Intravenous Line  Duration                  Peripheral IV - Single Lumen 07/22/22 0618 18 G Left;Posterior Hand <1 day                    Significant Labs:    CBC/Anemia Profile:  Recent Labs   Lab 07/21/22  1131 07/22/22  0945 07/22/22  1046 07/22/22  1112 07/22/22  1137   WBC 8.55  --   --   --   --    HGB 15.6  --   --   --   --    HCT 46.6   < > 39 43 40     --   --   --   --    MCV 99*  --   --   --   --    RDW 12.2  --   --   --   --     < > = values in this interval not displayed.        Chemistries:  Recent Labs   Lab 07/21/22  1131      K 4.3      CO2 30*   BUN 11   CREATININE 0.9   CALCIUM 9.2   ALBUMIN 3.7   PROT 7.0   BILITOT 0.5   ALKPHOS 69   ALT 76*   AST 41*       All pertinent labs within the past 24 hours have been reviewed.    Significant Imaging: I have reviewed all pertinent imaging results/findings within the past 24 hours.

## 2022-07-22 NOTE — PROGRESS NOTES
Autotransfusion/Rapid Infusion Record:      07/22/2022  Autotransfusionist:  Carmina Benjamin    Surgeon(s) and Role:     * Jet Gastelum MD - Primary     * Jesús Chapa MD - Resident - Assisting     * Shanae Fulton MD - Fellow  Anesthesiologist:  Filiberto Ludwig MD    Past Medical History:   Diagnosis Date    Arthritis     Asthma     Bronchitis     COPD (chronic obstructive pulmonary disease)     Coronary artery disease     Elevated LFTs     GERD (gastroesophageal reflux disease)     Hepatitis B core antibody positive     PATIENT UNAWARE    Hernia of abdominal wall     Hx of colonic polyps     Liver hemangioma     Renal insufficiency        Procedure(s) (LRB):  Valvuloplasty, Mitral (N/A)     1:39 PM    Equipment:    Cell Saver     R.I.S.  : [employer] Model: CATSmart or CATSplus : Price   Model: [id number]     Serial number:[id number]   Serial number:    Disposable lot #:    Disposable lot #:      Were extra cardiotomies used for cell saver? No  if yes, #:      Solutions:  Anticoagulant: ACD-A   Expiration date: 10/2023 Volume used: 500   Wash solution: 0.9% NaCl   Expiration date: 05/2025 Volume used: 5800     Cell saver checklist  Time completed: 07:32          [x]   Circuit assembled correctly     [x]   Cell saver powered and operational     [x]   Vacuum connected, functional, adjust to max -150mmHg     [x]   Anticoagulant drip rate adjusted     [x]   Transfer bag properly labeled with patient name, expiration time, volume,       anticoagulant, OR number, and initials     [x]   Cell saver disinfected after use (completed at end of case)       Cell Saver volumes:    Total volume processed:     4800 mL     Total volume pRBCs recovered     1150 mL     Volume pRBCs infused     1150 mL         RIS checklist   Time completed:  []   RIS circuit assembled correctly     []   RIS power and operational     []   RIS disinfected after use (completed at end of case)        RIS volumes:    Total volume infused:    (see anesthesia record for blood       product information)   0 mL       Additional comments:

## 2022-07-22 NOTE — ANESTHESIA PROCEDURE NOTES
Intubation    Date/Time: 7/22/2022 7:27 AM  Performed by: Estela Rosario MD  Authorized by: Filiberto Ludwig MD     Intubation:     Induction:  Intravenous    Intubated:  Postinduction    Mask Ventilation:  Very difficult with oral airway    Attempts:  1    Attempted By:  Resident anesthesiologist    Method of Intubation:  Video laryngoscopy    Blade:  Feng 4    Laryngeal View Grade: Grade I - full view of cords      Difficult Airway Encountered?: No      Complications:  None    Airway Device:  Oral endotracheal tube    Airway Device Size:  7.5    Style/Cuff Inflation:  Cuffed    Inflation Amount (mL):  6    Tube secured:  24    Secured at:  The lips    Placement Verified By:  Capnometry    Complicating Factors:  Obesity and short neck    Findings Post-Intubation:  BS equal bilateral and atraumatic/condition of teeth unchanged

## 2022-07-22 NOTE — CONSULTS
[facility] - Surgical Intensive Care  Endocrinology  Diabetes Consult Note    Consult Requested by: Jet Gastelum MD   Reason for admit: Moderate to severe mitral regurgitation    HISTORY OF PRESENT ILLNESS:  Reason for Consult: Management of Hyperglycemia     Surgical Procedure and Date: s/p MVR and PFO closure     Patient is not diabetic and does not currently take any oral/injectable antidiabetic/hypoglycemic medications.     Lab Results   Component Value Date    HGBA1C 5.4 07/21/2022         HPI: Kong Celaya is a 52 y.o. male who presents for MVr and PFO closure secondary to severe mitral regurgitation.  He has a medical history significant for asthma/bronchitis, mild diffuse coronary artery disease, elevated LFT's with liver hemangioma, and recently found severe mitral regurgitation with posterior leaflet prolapse. He reports that he recently found out about his mitral valve complication. He reports life limiting dyspnea on exertion, fatigue, and swelling. He reports swelling despite diuretic initiation.  He reports no longer being able to cut the grass due to SMART. Mr. Celaya is now s/p mitral valve repair and PFO closure on 7/22/22. The procedure was performed without apparent complication, however coming off pump, he was hyperkalemic with peaked T waves. He was given insulin and 40 lasix and his last K prior to transfer to SICU was 4.7. Endocrine consulted to manage post-operative hyperglycemia.             Interval HPI:   Overnight events: Patient admitted to the SICU in room 51539 CVICU/88931 CVICU A. Blood glucose stable. BG at and above goal on current insulin regimen (IIP). Steroid use- None. Day of Surgery  Renal function- Normal   Vasopressors-  Epinephrine       Diet NPO Except for: Sips with Medication     Eating:   NPO  Nausea: SIDNEY  Hypoglycemia and intervention: No  Fever: No  TPN and/or TF: No    PMH, PSH, FH, SH updated and reviewed     ROS:  Review of Systems  SIDNEY due to  intubation.     Current Medications and/or Treatments Impacting Glycemic Control  Immunotherapy:    Immunosuppressants       None          Steroids:   Hormones (From admission, onward)                None          Pressors:    Autonomic Drugs (From admission, onward)                Start     Stop Route Frequency Ordered    07/22/22 1345  EPINEPHrine 5 mg in dextrose 5% 250 mL infusion (premix)        Question Answer Comment   Begin at (in mcg/kg/min): 0.02    Titrate by: (in mcg/kg/min) 0.02    Titrate interval: (in minutes) 5    Titrate to maintain: (SBP or MAP or Cardiac Index) MAP    Greater than: (in mmHg) 65    Cardiac index greater than: (in L/min) 2.2    Maximum dose: (in mcg/kg/min) 2        -- IV Continuous 07/22/22 1334          Hyperglycemia/Diabetes Medications:   Antihyperglycemics (From admission, onward)                Start     Stop Route Frequency Ordered    07/22/22 1345  insulin regular in 0.9 % NaCl 100 unit/100 mL (1 unit/mL) infusion        Question Answer Comment   Insulin rate changes (DO NOT MODIFY ANSWER) \\[employer]\epic\Images\Pharmacy\InsulinInfusions\CTS INSULIN AE103S.pdf    Enter initial dose (Units/hr): 1        -- IV Continuous 07/22/22 1334             PHYSICAL EXAMINATION:  Vitals:    07/22/22 1530   BP:    Pulse: 82   Resp: (!) 26   Temp:      Body mass index is 42.62 kg/m².    Physical Exam  Constitutional: Well developed, obese, NAD.  ENT: External ears no masses with nose patent  Neck: Supple; trachea midline  Cardiovascular: Normal heart sounds, no LE edema. DP +2 bilaterally.  Lungs: Normal effort; lungs anterior bilaterally clear to auscultation.  Intubated on a ventilator.  Abdomen: Soft, no masses, no hernias.  Hypoactive BS noted.  MS: No clubbing or cyanosis of nails noted; unable to assess gait.  Skin: No rashes, lesions, or ulcers; no nodules.  Injection sites are ok. No lipo hypertropthy or atrophy.  Mid-sternal incision with telfa island dressing, CDI.  CT x  1..  Psychiatric: SIDNEY  Neurological: SIDNEY  Foot: Nails in good condition, no amputations noted        Labs Reviewed and Include   No results for input(s): GLU, CALCIUM, ALBUMIN, PROT, NA, K, CO2, CL, BUN, CREATININE, ALKPHOS, ALT, AST, BILITOT in the last 24 hours.  Lab Results   Component Value Date    WBC 34.42 (H) 07/22/2022    HGB 15.4 07/22/2022    HCT 47 07/22/2022    MCV 98 07/22/2022     07/22/2022     No results for input(s): TSH, FREET4 in the last 168 hours.  Lab Results   Component Value Date    HGBA1C 5.4 07/21/2022       Nutritional status:   Body mass index is 42.62 kg/m².  Lab Results   Component Value Date    ALBUMIN 3.7 07/21/2022    ALBUMIN 4.3 03/15/2018    ALBUMIN 4.2 10/31/2016     No results found for: PREALBUMIN    Estimated Creatinine Clearance: 134.2 mL/min (based on SCr of 0.9 mg/dL).    Accu-Checks  Recent Labs     07/22/22  1407 07/22/22  1507   POCTGLUCOSE 178* 175*        ASSESSMENT and PLAN    * Moderate to severe mitral regurgitation  S/P MVR  Managed per primary team  Avoid hypoglycemia        Hyperglycemia  Endocrinology consulted for BG management.   BG goal 110-140 CTS    - IIP  - Requires intensive BG monitoring.   - BG checks q1hr  - Hypoglycemia protocol in place    - Notify endocrine if patient becomes hypokalemic (K < 3.3) and/or is not responding to replacement.   - Notify endocrine if patient requiring > 20 units/hr.      ** Please notify Endocrine for any change and/or advance in diet**  ** Please call Endocrine for any BG related issues **    Discharge Planning:   TBD. Please notify endocrinology prior to discharge.        Surgical wound present  Optimize BG control to improve wound healing            Plan discussed with patient, family, and RN at bedside.        Harish Landrum, DNP, FNP  Endocrinology  [facility] - Surgical Intensive Care

## 2022-07-22 NOTE — NURSING
Received pt from OR. MD, RN and RT at bedside. Pt arrived intubated. Epi, propofol, TXA, Levo infusing. Pt connected to continuous cardiac, BP, and O2 monitoring. Labs and ABG taken.  Chest tube connected to continuous suction. Amber diaz MD ordered Cleviprex and was hung.     Wife notified. All questions addressed.             Skin: no noted skin breakdown. Foams applied to sacrum and bilateral heels. Lines and devices free from skin contact.

## 2022-07-22 NOTE — OP NOTE
DATE OF PROCEDURE:  07/22/2022     ATTENDING SURGEON:  Jet Gastelum M.D.    ASSISTANT: Shanae Fulton MD, (Cardiothoracic Resident)     PREOPERATIVE DIAGNOSES:  1.  Severe mitral regurgitation.  2.  Patent foramen ovale  3.  Asthma  4.  Hepatic hemangioma    POSTOPERATIVE DIAGNOSES:  1.  Severe mitral regurgitation.  2.  Patent foramen ovale  3.  Asthma  4.  Hepatic hemangioma    OPERATION PERFORMED:      1)  Mitral valve repair with closure of cleft between P2 and P3 and a 38 mm Medtronic Simuplus band annuloplasty.    2)  Closure of patent foramen ovale      ANESTHESIA:  General endotracheal.     ESTIMATED BLOOD LOSS:  100 mL.      BRIEF HISTORY:  Mr. Celaya is a very pleasant 52-year-old gentleman who initially presented with lifestyle limiting dyspnea on exertion fatigue, and lower extremity edema.  He had a thorough evaluation which demonstrated no hemodynamically significant coronary lesions and severe mitral regurgitation.  He was also found to have a patent foramen ovale.  He now presents for mitral valve repair and closure of his patent foramen ovale.     PROCEDURE IN DETAIL:  After obtaining informed and written consent, the patient   was brought to the Operating Room and placed on the operating table in supine   position.  After induction of adequate general endotracheal anesthesia, the   patient's chest, abdomen, pelvis and bilateral lower extremities were prepped   and draped in the usual sterile fashion.  An upper midline skin incision was   made, and a median sternotomy was performed.  A pericardial well was created.    The patient was systemically heparinized.  Cannulation sutures were placed in   the aorta and in the superior vena cava.  The aortic cannula was inserted,   followed by the venous.  An IVC cannula was also placed.  Antegrade and   retrograde cardioplegia catheters were placed.  The patient was then put on   cardiopulmonary bypass.  Once on bypass, circumferential control  was obtained   over the superior vena cava.  The aortic crossclamp was applied, and the heart   was arrested using cold blood enhanced antegrade cardioplegia.  A prompt   electromechanical arrest was achieved.    A left atriotomy was then made near the right-sided pulmonary veins. The Bond   retractor was then placed for exposure, and the mitral valve was evaluated.  The valve was tested, and there was poor coaptation throughout.  There were no overt elongated or broken chords.  Multiple nonpledgeted 2-0 Ethibond sutures were placed along   the posterior annulus from the medial trigone to the lateral trigone.  The valve was again tested, and there was poor coaptation of a cleft between P2 and P3.  The cleft between P2 and P3 was then closed using a running 5-0 Ethibond in two layers.  The valve was then   tested, and no significant mitral regurgitation was seen.  The annulus was   sized, and a 38 mm band was selected.  The band was brought up, the annuloplasty   sutures were passed through it, and it was slid into position.  It seated   nicely.  The sutures were tied and then cut.  The valve was again tested, and   again no mitral regurgitation was seen.  The retractor was taken down, and the patent foramen ovale was closed in 2 layers using running 4-0 Prolene suture.  The left atriotomy was then closed in a   single layer using running 4-0 Prolene suture.  Prior to closing the left   atrium, de-airing maneuvers were performed.  Once the left atrium was closed,   the hot shot was given retrograde.  Additional de-airing maneuvers were   performed, and the aortic cross-clamp was removed.      The patient was subsequently weaned from cardiopulmonary bypass.  The patient did separate easily from bypass.  Once off bypass, all surgical sites were inspected.  There was good hemostasis.  The valve was evaluated using BRANDON.  There was no residual mitral   regurgitation seen. The test dose of protamine was administered, and  this was well tolerated.  The total dose was then given.  FPC through the total dose, all cannulas were removed. All surgical sites were again inspected, again there was good hemostasis.    Ventricular pacing wires were placed.  Drains were placed.  After again   confirming adequate hemostasis, the patient's chest was closed using eight #6   stainless steel wires to reapproximate the sternum.  The overlying soft tissues   were reapproximated using absorbable suture material.  The patient's chest was   washed and dried, and a dry dressing was applied.  The patient tolerated the   procedure well, there were no complications.  At the conclusion of the case,   sponge and instrument counts were correct.

## 2022-07-22 NOTE — INTERVAL H&P NOTE
The patient has been examined and the H&P has been reviewed:    I concur with the findings and no changes have occurred since H&P was written.    Surgery risks, benefits and alternative options discussed and understood by patient/family.          There are no hospital problems to display for this patient.    Shanae Fulton MD, PGY-7  Cardiothoracic Surgery   [phone]

## 2022-07-22 NOTE — BRIEF OP NOTE
[facility] - [facility] Intensive [facility]  Cardiothoracic Surgery  Operative Note    SUMMARY     Date of Procedure: 7/22/2022     Procedure: Procedure(s) (LRB):  1)  Valvuloplasty, Mitral with closure of cleft between P2 and P3 and 38 mm Medtronic Simuplus band annuloplasty  33014    2)  Closure of PFO  35319    Surgeon(s) and Role:     * Jet Gastelum MD - Primary     * Jesús Chapa MD - Resident - Assisting     * Shanae Fulton MD - Fellow        Pre-Operative Diagnosis: Moderate to severe mitral regurgitation [I34.0]    Post-Operative Diagnosis: Post-Op Diagnosis Codes:     * Moderate to severe mitral regurgitation [I34.0]    Anesthesia: General    Operative Findings (including complications, if any): Annular dilation as well as poor coaptation between P2/P3 and anterior leaflet. No MR on post BRANDON.      Estimated Blood Loss (EBL): 100 mL         Implants:   Implant Name Type Inv. Item Serial No.  Lot No. LRB No. Used Action   Simuplus flexible annuloplasty band   [id number] [employer]  N/A 1 Implanted   PUTTY HEMASORB HEMOSTAT 4GM - [id number]  PUTTY HEMASORB HEMOSTAT 4GM  [id number]  N/A 1 Implanted       Specimens:   Specimen (24h ago, onward)            None                  Attestation:  I was present and scrubbed for the procedure.

## 2022-07-22 NOTE — ANESTHESIA PROCEDURE NOTES
Central Line    Diagnosis: Mitral Valve Regurgitation  Patient location during procedure: pre-op    Staffing  Authorizing Provider: Filiberto Ludwig MD  Performing Provider: Estela Rosario MD    Staffing  Performed: resident/CRNA   Anesthesiologist was present at the time of the procedure.  Preanesthetic Checklist  Completed: patient identified, IV checked, site marked, risks and benefits discussed, surgical consent, monitors and equipment checked, pre-op evaluation, timeout performed and anesthesia consent given  Indication   Indication: vascular access, med administration, hemodynamic monitoring     Anesthesia   general anesthesia    Central Line   Skin Prep: skin prepped with ChloraPrep, skin prep agent completely dried prior to procedure  Sterile Barriers Followed: Yes    All five maximal barriers used- gloves, gown, cap, mask, and large sterile sheet    hand hygiene performed prior to central venous catheter insertion  Location: right internal jugular.   Catheter type: double lumen (MAC)  Catheter Size: 9 Fr  Ultrasound: vascular probe with ultrasound   Vessel Caliber: patent  Needle advanced into vessel with real time Ultrasound guidance.  Guidewire confirmed in vessel.  Image recorded and saved.  sterile gel and probe cover used in ultrasound-guided central venous catheter insertion   Manometry: Venous cannualation confirmed by visual estimation of blood vessel pressure using manometry.  Insertion Attempts: 1   Securement:line sutured, chlorhexidine patch, sterile dressing applied and blood return through all ports    Post-Procedure    Adverse Events:none      Guidewire Guidewire removed intact. Guidewire removed intact, verified with nurse.

## 2022-07-22 NOTE — ASSESSMENT & PLAN NOTE
Kong Celaya is a 52 y.o. male with medical problems including asthma/chronic bronchitis and severe mitral regurgitation now s/p MV repair and PFO closure on 7/22/22.      Neuro/Psych:   -- Sedation: Propofol. Wean as tolerated with plans to extubate    -- Pain: PRN Oxy + Dilaudid   -- Scheduled Tylenol             Cards:   -- S/P MVR, PFO closure on 7/22/22  -- HDS on 0.08 epi. Plan to wean slowly to 0.04 tonight as tolerated  -- Ventricular pacing wires.  -- MAP 60-80, Syst < 130  -- Cleviprex PRN  -- Aspirin 325mg tonight pending postoperative coags and chest tube output      Pulm:   -- Goal O2 sat > 90%  -- ABG Q1hr x3 then Q3hrs  -- Wean vent as tolerated  -- Plan to extubate tonight if stable  -- Duo-nebs Q4, Breo QD  -- Mediastinal chest tubes x2 to suction      Renal:  -- Keep alvarado for strict I/O  -- Trend BUN/Cr       FEN / GI:   -- Replace lytes as needed  -- Nutrition: NPO  -- GI ppx: famotidine  -- Bowel reg: miralax      ID:   -- Tm: afebrile; WBC stable  -- Kacy-op ancef      Heme/Onc:   -- H/H stable   -- Daily CBC  -- 1150 ml Cell saver given back  -- Post-op coags pending  -- Aspirin 325mg QD      Endo:   -- BG goal 140-180  -- Insulin gtt      PPx:   Feeding: NPO  Analgesia/Sedation: Propofol / PRN Oxy + Dilaudid  Thromboembolic prevention: SCDs  HOB >30: Yes  Stress Ulcer ppx: famotidine  Glucose control: Critical care goal 140-180 g/dl, ISS     Lines/Drains/Airway: CVC RIJ, Alvarado, Chest tubes x 2, ventricular pacing wires, L ulnar A-line      Dispo/Code Status/Palliative:   -- SICU / Full Code.

## 2022-07-22 NOTE — CARE UPDATE
Pt extubated to 4L NC. Pt tolerated well. RT suctioned pt above and below cuff prior to extubation. Cuff deflated upon cough maneuver. Pt on 4L NC and vitally stable. Will continue to monitor pt

## 2022-07-22 NOTE — HPI
Kong Celaya is a 52 y.o. male who presents for MVr and PFO closure secondary to severe mitral regurgitation.  He has a medical history significant for asthma/bronchitis, mild diffuse coronary artery disease, elevated LFT's with liver hemangioma, and recently found severe mitral regurgitation with posterior leaflet prolapse. He reports that he recently found out about his mitral valve complication. He reports life limiting dyspnea on exertion, fatigue, and swelling. He reports swelling despite diuretic initiation.  He reports no longer being able to cut the grass due to SMART.     Mr. Celaya is now s/p mitral valve repair and PFO closure on 7/22/22. The procedure was performed without apparent complication, however coming off pump, he was hyperkalemic with peaked T waves. He was given insulin and 40 lasix and his last K prior to transfer to SICU was 4.7.  Intraoperatively he received 2L of crystalloid, and 1150mL of Cell Saver back. The reported urine output during the case was 1050mL. The postoperative echocardiogram revealed normal biventricular function with no notable mitral regurgitation. Mr. Celaya arrives to the SICU intubated, sedated, and hemodynamically stable on 0.08 of epi.

## 2022-07-22 NOTE — ANESTHESIA POSTPROCEDURE EVALUATION
Anesthesia Post Evaluation    Patient: Kong Celaya    Procedure(s) Performed: Procedure(s) (LRB):  Valvuloplasty, Mitral (N/A)    Final Anesthesia Type: general      Patient location during evaluation: ICU  Patient participation: No - Unable to Participate, Sedation  Level of consciousness: sedated  Post-procedure vital signs: reviewed and stable  Pain management: adequate  Airway patency: patent    PONV status at discharge: No PONV  Anesthetic complications: no      Cardiovascular status: hemodynamically stable  Respiratory status: ETT, intubated and ventilator  Hydration status: euvolemic  Follow-up not needed.          Vitals Value Taken Time   /69 07/22/22 1436   Temp 37.1 °C (98.7 °F) 07/22/22 1400   Pulse 72 07/22/22 1459   Resp 26 07/22/22 1459   SpO2 97 % 07/22/22 1459   Vitals shown include unvalidated device data.      No case tracking events are documented in the log.      Pain/Paddy Score: No data recorded

## 2022-07-22 NOTE — ASSESSMENT & PLAN NOTE
Endocrinology consulted for BG management.   BG goal 110-140 CTS    - IIP  - Requires intensive BG monitoring.   - BG checks q1hr  - Hypoglycemia protocol in place    - Notify endocrine if patient becomes hypokalemic (K < 3.3) and/or is not responding to replacement.   - Notify endocrine if patient requiring > 20 units/hr.      ** Please notify Endocrine for any change and/or advance in diet**  ** Please call Endocrine for any BG related issues **    Discharge Planning:   TBD. Please notify endocrinology prior to discharge.

## 2022-07-22 NOTE — RESPIRATORY THERAPY
Received patient from OR intubated with 7.5 ETT secured at 24cm at the lips. Placed on ventilator with documented settings. ABG done. Ambu and mask at bedside. Will continue to monitor.

## 2022-07-22 NOTE — ANESTHESIA PROCEDURE NOTES
[facility]    Diagnosis: Mitral Regurgitation  Patient location during procedure: OR  Procedure start time: 7/22/2022 8:01 AM  Timeout: 7/22/2022 8:00 AM  Procedure end time: 7/22/2022 1:00 PM  Exam type: Baseline    Staffing    Anesthesiologist: Filiberto Ludwig MD        Anesthesiologist Present  Yes      Setup & Induction  Patient preparation: bite block inserted  Probe Insertion: easy  Exam: completeDoppler Echo: 2D, 3D, color flow mapping, continuous wave Doppler and pulse wave Doppler.  Exam     Left Heart  Left Atruim: dilated        LVAD:no                Aortic Valve:  Morphology: trileaflet    Regurgitation: no aortic valve regurgitation      Mitral Valve:   Morphology:normal  Jet Description: moderate-to-severe    Tricuspid Valve:  Morphology: normal    Pulmonic Valve:  Morphology:normal          Effusions none    SummaryFindings discussed with surgeon.    Other Findings   Pre   Normal biventricular systolic function  Moderate to severe MR, VC 0.45  Systolic blunting pulmonary veins, aneteriorly directed P2 + P2/P3 location  MR V 5.0   La dilated   Small PFO with left to right flow   No effusions   Aorta intact    Post MV Repair    No residual MR   Mean gradient 1 mmHg   No effusions  Biventricular function unchanged   Aorta intact  Probe removed atraumatically

## 2022-07-22 NOTE — ANESTHESIA PROCEDURE NOTES
Arterial    Diagnosis: MVR    Patient location during procedure: done in OR    Staffing  Authorizing Provider: Filiberto Ludwig MD  Performing Provider: Estela Rosario MD    Anesthesiologist was present at the time of the procedure.    Preanesthetic Checklist  Completed: patient identified, IV checked, site marked, risks and benefits discussed, surgical consent, monitors and equipment checked, pre-op evaluation, timeout performed and anesthesia consent givenArterial  Skin Prep: chlorhexidine gluconate  Orientation: left  Location: ulnar    Catheter Size: 20 G  Catheter placement by Ultrasound guidance. Heme positive aspiration all ports.   Vessel Caliber: small, patent, compressibility normal  Needle advanced into vessel with real time Ultrasound guidance.Insertion Attempts: 3  Assessment  Dressing: secured with tape and tegaderm  Patient: Tolerated well

## 2022-07-22 NOTE — H&P
[facility]  Critical Care - Surgery  History & Physical    Patient Name: Kong Celaya  MRN: [id number]  Admission Date: 7/22/2022  Code Status: Full Code  Attending Physician: Jet Gastelum MD   Primary Care Provider: Dax Thomas MD   Principal Problem: Moderate to severe mitral regurgitation    Subjective:     HPI:  Kong Celaya is a 52 y.o. male who presents for MVr and PFO closure secondary to severe mitral regurgitation.  He has a medical history significant for asthma/bronchitis, mild diffuse coronary artery disease, elevated LFT's with liver hemangioma, and recently found severe mitral regurgitation with posterior leaflet prolapse. He reports that he recently found out about his mitral valve complication. He reports life limiting dyspnea on exertion, fatigue, and swelling. He reports swelling despite diuretic initiation.  He reports no longer being able to cut the grass due to SMART.     Mr. Celaya is now s/p mitral valve repair and PFO closure on 7/22/22. The procedure was performed without apparent complication, however coming off pump, he was hyperkalemic with peaked T waves. He was given insulin and 40 lasix and his last K prior to transfer to SICU was 4.7.  Intraoperatively he received 2L of crystalloid, and 1150mL of Cell Saver back. The reported urine output during the case was 1050mL. The postoperative echocardiogram revealed normal biventricular function with no notable mitral regurgitation. Mr. Celaya arrives to the SICU intubated, sedated, and hemodynamically stable on 0.08 of epi.        Hospital/ICU Course:  No notes on file    Follow-up For: Procedure(s) (LRB):  Valvuloplasty, Mitral (N/A)    Post-Operative Day: Day of Surgery     Past Medical History:   Diagnosis Date    Arthritis     Asthma     Bronchitis     COPD (chronic obstructive pulmonary disease)     Coronary artery disease     Elevated LFTs     GERD (gastroesophageal reflux  disease)     Hepatitis B core antibody positive     PATIENT UNAWARE    Hernia of abdominal wall     Hx of colonic polyps     Liver hemangioma     Renal insufficiency        Past Surgical History:   Procedure Laterality Date    CHOLECYSTECTOMY      COLONOSCOPY N/A 4/12/2018    Procedure: COLONOSCOPY;  Surgeon: Sixto Robertson MD;  Location: [facility];  Service: Endoscopy;  Laterality: N/A;    LEFT HEART CATHETERIZATION Left 5/12/2022    Procedure: Left heart cath;  Surgeon: Hung Larsen MD;  Location: [facility] CATH;  Service: Cardiovascular;  Laterality: Left;    TONSILLECTOMY      TRANSESOPHAGEAL ECHOCARDIOGRAPHY N/A 5/12/2022    Procedure: ECHOCARDIOGRAM, TRANSESOPHAGEAL;  Surgeon: Hung Larsen MD;  Location: [facility] CATH;  Service: Cardiovascular;  Laterality: N/A;    UMBILICAL HERNIA REPAIR      UPPER GASTROINTESTINAL ENDOSCOPY         Review of patient's allergies indicates:   Allergen Reactions    Adhesive Other (See Comments)     Blisters        Family History       Problem Relation (Age of Onset)    Diabetes Mother          Tobacco Use    Smoking status: Current Every Day Smoker     Packs/day: 0.50     Years: 25.00     Pack years: 12.50    Smokeless tobacco: Never Used   Substance and Sexual Activity    Alcohol use: Yes     Alcohol/week: 0.0 standard drinks     Comment: WEEKLY    Drug use: Not Currently     Types: Marijuana    Sexual activity: Yes     Partners: Female      Review of Systems   Reason unable to perform ROS: Intubated.   Objective:     Vital Signs (Most Recent):  Temp: 98.7 °F (37.1 °C) (07/22/22 1400)  Pulse: 83 (07/22/22 1411)  Resp: 20 (07/22/22 0614)  BP: 138/78 (07/22/22 0658)  SpO2: 100 % (07/22/22 1411) Vital Signs (24h Range):  Temp:  [97.7 °F (36.5 °C)-98.7 °F (37.1 °C)] 98.7 °F (37.1 °C)  Pulse:  [71-83] 83  Resp:  [18-20] 20  SpO2:  [96 %-100 %] 100 %  BP: (131-138)/(76-86) 138/78     Weight: 134.7 kg (297 lb)  Body mass index is 42.62 kg/m².      Intake/Output  Summary (Last 24 hours) at 7/22/2022 1415  Last data filed at 7/22/2022 1400  Gross per 24 hour   Intake 2500 ml   Output 1518 ml   Net 982 ml       Physical Exam  Constitutional:       Comments: Intubated and sedated   HENT:      Head: Normocephalic and atraumatic.   Neck:      Comments: Right IJ CVC  Cardiovascular:      Rate and Rhythm: Normal rate and regular rhythm.      Comments: V-wires  Meds x2  Midline sternotomy incision  Pulmonary:      Comments: Intubated  Abdominal:      General: There is no distension.      Palpations: Abdomen is soft.   Genitourinary:     Comments: [place]chola with pink urine  Musculoskeletal:      Comments: L ulnar art line   Neurological:      Comments: Sedated       Vents:  Vent Mode: A/C (07/22/22 1411)  Ventilator Initiated: Yes (07/22/22 1403)  Set Rate: 26 BPM (07/22/22 1411)  Vt Set: 500 mL (07/22/22 1411)  PEEP/CPAP: 13 cmH20 (07/22/22 1411)  Oxygen Concentration (%): 100 (07/22/22 1411)  Peak Airway Pressure: 29 cmH2O (07/22/22 1411)  Plateau Pressure: 25 cmH20 (07/22/22 1411)  Total Ve: 13 mL (07/22/22 1411)  Negative Inspiratory Force (cm H2O): 0 (07/22/22 1411)    Lines/Drains/Airways       Central Venous Catheter Line  Duration             Percutaneous Central Line Insertion/Assessment - Double Lumen  07/22/22 1038 right internal jugular <1 day              Drain  Duration                  Urethral Catheter 07/22/22 0740 Non-latex;Temperature probe;Straight-tip 14 Fr. <1 day         Y Chest Tube 1 and 2 07/22/22 1301 1 Right Mediastinal 19 Fr. 2 Left Mediastinal 19 Fr. <1 day              Airway  Duration                  Airway - Non-Surgical 07/22/22 0727 <1 day              Arterial Line  Duration             Arterial Line 07/22/22 1031 Left Other (Comment) <1 day              Line  Duration                  Pacer Wires 07/22/22 1300 <1 day              Peripheral Intravenous Line  Duration                  Peripheral IV - Single Lumen 07/22/22 0618 18 G Left;Posterior Hand  <1 day                    Significant Labs:    CBC/Anemia Profile:  Recent Labs   Lab 07/21/22  1131 07/22/22  0945 07/22/22  1046 07/22/22  1112 07/22/22  1137   WBC 8.55  --   --   --   --    HGB 15.6  --   --   --   --    HCT 46.6   < > 39 43 40     --   --   --   --    MCV 99*  --   --   --   --    RDW 12.2  --   --   --   --     < > = values in this interval not displayed.        Chemistries:  Recent Labs   Lab 07/21/22  1131      K 4.3      CO2 30*   BUN 11   CREATININE 0.9   CALCIUM 9.2   ALBUMIN 3.7   PROT 7.0   BILITOT 0.5   ALKPHOS 69   ALT 76*   AST 41*       All pertinent labs within the past 24 hours have been reviewed.    Significant Imaging: I have reviewed all pertinent imaging results/findings within the past 24 hours.    Assessment/Plan:     * Moderate to severe mitral regurgitation  Kong Celaya is a 52 y.o. male with medical problems including asthma/chronic bronchitis and severe mitral regurgitation now s/p MV repair and PFO closure on 7/22/22.      Neuro/Psych:   -- Sedation: Propofol. Wean as tolerated with plans to extubate    -- Pain: PRN Oxy + Dilaudid   -- Scheduled Tylenol             Cards:   -- S/P MVR, PFO closure on 7/22/22  -- HDS on 0.08 epi. Plan to wean slowly to 0.04 tonight as tolerated  -- Ventricular pacing wires.  -- MAP 60-80, Syst < 130  -- Cleviprex PRN  -- Aspirin 325mg tonight pending postoperative coags and chest tube output      Pulm:   -- Goal O2 sat > 90%  -- ABG Q1hr x3 then Q3hrs  -- Wean vent as tolerated  -- Plan to extubate tonight if stable  -- Duo-nebs Q4, Breo QD  -- Mediastinal chest tubes x2 to suction      Renal:  -- Keep alvarado for strict I/O  -- Trend BUN/Cr       FEN / GI:   -- Replace lytes as needed  -- Nutrition: NPO  -- GI ppx: famotidine  -- Bowel reg: miralax      ID:   -- Tm: afebrile; WBC stable  -- Kacy-op ancef      Heme/Onc:   -- H/H stable   -- Daily CBC  -- 1150 ml Cell saver given back  -- Post-op coags  pending  -- Aspirin 325mg QD      Endo:   -- BG goal 140-180  -- Insulin gtt      PPx:   Feeding: NPO  Analgesia/Sedation: Propofol / PRN Oxy + Dilaudid  Thromboembolic prevention: SCDs  HOB >30: Yes  Stress Ulcer ppx: famotidine  Glucose control: Critical care goal 140-180 g/dl, ISS     Lines/Drains/Airway: CVC RIJ, [place]ch[place], Chest tubes x 2, ventricular pacing wires, L ulnar A-line      Dispo/Code Status/Palliative:   -- SICU / Full Code.             Harish Forrest MD  Critical Care - Surgery  [facility]

## 2022-07-22 NOTE — PLAN OF CARE
"  Problem: Adult Inpatient Plan of Care  Goal: Patient-Specific Goal (Individualized)  Flowsheets (Taken 7/22/2022 1823)  Patient-Specific Goals (Include Timeframe): maintain MAP greater than 65               SICU PLAN OF CARE NOTE    Dx: Moderate to severe mitral regurgitation    Shift Events: PT admitted to SICU from OR. See admit note for additional details.     Goals of Care: MAP > 65, SBP <130    Neuro: Follows Commands and Moves All Extremities    Vital Signs: /78   Pulse 76   Temp 98.7 °F (37.1 °C)   Resp (!) 22   Ht 5' 10" (1.778 m)   Wt 134.7 kg (297 lb)   SpO2 (!) 93%   BMI 42.62 kg/m²     Respiratory: HFNC 10L. PT extubated at 1745 to 4L Nasal Cannula. ABG drawn 20 min later, and O2 62. HFNC applied at 10 L    Diet: NPO    Gtts: Insulin and Epinephrine    Urine Output: Urinary Catheter 150-300 cc/hour    Drains: Chest Tube, total output 10-30 cc /  hour    Labs/Accuchecks:.    Skin: No noted skin breakdown. Heels elevated off of bed. Pt repositioned q2 hrs. Pressure points protected. Lines and devices free from skin contact. Midsternal incision covered by island/border dressing.       "

## 2022-07-23 LAB
ALBUMIN SERPL BCP-MCNC: 3.3 G/DL (ref 3.5–5.2)
ALLENS TEST: ABNORMAL
ALLENS TEST: NORMAL
ALP SERPL-CCNC: 46 U/L (ref 55–135)
ALT SERPL W/O P-5'-P-CCNC: 59 U/L (ref 10–44)
ANION GAP SERPL CALC-SCNC: 9 MMOL/L (ref 8–16)
AST SERPL-CCNC: 89 U/L (ref 10–40)
BASOPHILS # BLD AUTO: 0.03 K/UL (ref 0–0.2)
BASOPHILS NFR BLD: 0.2 % (ref 0–1.9)
BILIRUB SERPL-MCNC: 1 MG/DL (ref 0.1–1)
BUN SERPL-MCNC: 14 MG/DL (ref 6–20)
CALCIUM SERPL-MCNC: 7.6 MG/DL (ref 8.7–10.5)
CHLORIDE SERPL-SCNC: 106 MMOL/L (ref 95–110)
CO2 SERPL-SCNC: 22 MMOL/L (ref 23–29)
CREAT SERPL-MCNC: 0.8 MG/DL (ref 0.5–1.4)
DELSYS: ABNORMAL
DELSYS: NORMAL
DIFFERENTIAL METHOD: ABNORMAL
EOSINOPHIL # BLD AUTO: 0 K/UL (ref 0–0.5)
EOSINOPHIL NFR BLD: 0.1 % (ref 0–8)
ERYTHROCYTE [DISTWIDTH] IN BLOOD BY AUTOMATED COUNT: 12.6 % (ref 11.5–14.5)
EST. GFR  (AFRICAN AMERICAN): >60 ML/MIN/1.73 M^2
EST. GFR  (NON AFRICAN AMERICAN): >60 ML/MIN/1.73 M^2
FLOW: 4
FLOW: 4
FLOW: 6
FLOW: 6
GLUCOSE SERPL-MCNC: 126 MG/DL (ref 70–110)
HCO3 UR-SCNC: 23.5 MMOL/L (ref 24–28)
HCO3 UR-SCNC: 25 MMOL/L (ref 24–28)
HCT VFR BLD AUTO: 37.1 % (ref 40–54)
HCT VFR BLD CALC: 35 %PCV (ref 36–54)
HCT VFR BLD CALC: 37 %PCV (ref 36–54)
HGB BLD-MCNC: 12.4 G/DL (ref 14–18)
IMM GRANULOCYTES # BLD AUTO: 0.08 K/UL (ref 0–0.04)
IMM GRANULOCYTES NFR BLD AUTO: 0.6 % (ref 0–0.5)
INR PPP: 1.2 (ref 0.8–1.2)
LDH SERPL L TO P-CCNC: 0.59 MMOL/L (ref 0.36–1.25)
LDH SERPL L TO P-CCNC: 1.61 MMOL/L (ref 0.36–1.25)
LYMPHOCYTES # BLD AUTO: 1.9 K/UL (ref 1–4.8)
LYMPHOCYTES NFR BLD: 13.1 % (ref 18–48)
MAGNESIUM SERPL-MCNC: 1.9 MG/DL (ref 1.6–2.6)
MCH RBC QN AUTO: 32.5 PG (ref 27–31)
MCHC RBC AUTO-ENTMCNC: 33.4 G/DL (ref 32–36)
MCV RBC AUTO: 97 FL (ref 82–98)
MODE: ABNORMAL
MODE: NORMAL
MONOCYTES # BLD AUTO: 1.5 K/UL (ref 0.3–1)
MONOCYTES NFR BLD: 10.6 % (ref 4–15)
NEUTROPHILS # BLD AUTO: 10.6 K/UL (ref 1.8–7.7)
NEUTROPHILS NFR BLD: 75.4 % (ref 38–73)
NRBC BLD-RTO: 0 /100 WBC
PCO2 BLDA: 35.4 MMHG (ref 35–45)
PCO2 BLDA: 47.4 MMHG (ref 35–45)
PH SMN: 7.33 [PH] (ref 7.35–7.45)
PH SMN: 7.43 [PH] (ref 7.35–7.45)
PHOSPHATE SERPL-MCNC: 3.6 MG/DL (ref 2.7–4.5)
PLATELET # BLD AUTO: 109 K/UL (ref 150–450)
PMV BLD AUTO: 10.7 FL (ref 9.2–12.9)
PO2 BLDA: 121 MMHG (ref 80–100)
PO2 BLDA: 63 MMHG (ref 80–100)
POC BE: -1 MMOL/L
POC BE: -1 MMOL/L
POC IONIZED CALCIUM: 1.04 MMOL/L (ref 1.06–1.42)
POC IONIZED CALCIUM: 1.11 MMOL/L (ref 1.06–1.42)
POC SATURATED O2: 93 % (ref 95–100)
POC SATURATED O2: 98 % (ref 95–100)
POC TCO2: 25 MMOL/L (ref 23–27)
POC TCO2: 26 MMOL/L (ref 23–27)
POCT GLUCOSE: 113 MG/DL (ref 70–110)
POCT GLUCOSE: 115 MG/DL (ref 70–110)
POCT GLUCOSE: 116 MG/DL (ref 70–110)
POCT GLUCOSE: 117 MG/DL (ref 70–110)
POCT GLUCOSE: 122 MG/DL (ref 70–110)
POCT GLUCOSE: 124 MG/DL (ref 70–110)
POCT GLUCOSE: 124 MG/DL (ref 70–110)
POCT GLUCOSE: 125 MG/DL (ref 70–110)
POCT GLUCOSE: 130 MG/DL (ref 70–110)
POCT GLUCOSE: 144 MG/DL (ref 70–110)
POCT GLUCOSE: 149 MG/DL (ref 70–110)
POCT GLUCOSE: 96 MG/DL (ref 70–110)
POCT GLUCOSE: 99 MG/DL (ref 70–110)
POTASSIUM BLD-SCNC: 3.8 MMOL/L (ref 3.5–5.1)
POTASSIUM BLD-SCNC: 4.3 MMOL/L (ref 3.5–5.1)
POTASSIUM SERPL-SCNC: 4 MMOL/L (ref 3.5–5.1)
POTASSIUM SERPL-SCNC: 4.3 MMOL/L (ref 3.5–5.1)
PROT SERPL-MCNC: 5.4 G/DL (ref 6–8.4)
PROTHROMBIN TIME: 12.4 SEC (ref 9–12.5)
RBC # BLD AUTO: 3.82 M/UL (ref 4.6–6.2)
SAMPLE: ABNORMAL
SAMPLE: NORMAL
SITE: ABNORMAL
SITE: NORMAL
SODIUM BLD-SCNC: 140 MMOL/L (ref 136–145)
SODIUM BLD-SCNC: 141 MMOL/L (ref 136–145)
SODIUM SERPL-SCNC: 137 MMOL/L (ref 136–145)
WBC # BLD AUTO: 14.1 K/UL (ref 3.9–12.7)

## 2022-07-23 PROCEDURE — 37799 UNLISTED PX VASCULAR SURGERY: CPT

## 2022-07-23 PROCEDURE — 25000242 PHARM REV CODE 250 ALT 637 W/ HCPCS: Performed by: NURSE PRACTITIONER

## 2022-07-23 PROCEDURE — 94799 UNLISTED PULMONARY SVC/PX: CPT

## 2022-07-23 PROCEDURE — 99232 PR SUBSEQUENT HOSPITAL CARE,LEVL II: ICD-10-PCS | Mod: ,,, | Performed by: NURSE PRACTITIONER

## 2022-07-23 PROCEDURE — 84295 ASSAY OF SERUM SODIUM: CPT

## 2022-07-23 PROCEDURE — 63600175 PHARM REV CODE 636 W HCPCS

## 2022-07-23 PROCEDURE — 85014 HEMATOCRIT: CPT

## 2022-07-23 PROCEDURE — 20000000 HC ICU ROOM

## 2022-07-23 PROCEDURE — 25000003 PHARM REV CODE 250: Performed by: STUDENT IN AN ORGANIZED HEALTH CARE EDUCATION/TRAINING PROGRAM

## 2022-07-23 PROCEDURE — 82330 ASSAY OF CALCIUM: CPT

## 2022-07-23 PROCEDURE — 25000003 PHARM REV CODE 250

## 2022-07-23 PROCEDURE — 84132 ASSAY OF SERUM POTASSIUM: CPT

## 2022-07-23 PROCEDURE — 94640 AIRWAY INHALATION TREATMENT: CPT

## 2022-07-23 PROCEDURE — 25000242 PHARM REV CODE 250 ALT 637 W/ HCPCS

## 2022-07-23 PROCEDURE — 84132 ASSAY OF SERUM POTASSIUM: CPT | Performed by: THORACIC SURGERY (CARDIOTHORACIC VASCULAR SURGERY)

## 2022-07-23 PROCEDURE — 80053 COMPREHEN METABOLIC PANEL: CPT | Performed by: STUDENT IN AN ORGANIZED HEALTH CARE EDUCATION/TRAINING PROGRAM

## 2022-07-23 PROCEDURE — 63600175 PHARM REV CODE 636 W HCPCS: Performed by: STUDENT IN AN ORGANIZED HEALTH CARE EDUCATION/TRAINING PROGRAM

## 2022-07-23 PROCEDURE — 83605 ASSAY OF LACTIC ACID: CPT

## 2022-07-23 PROCEDURE — 99291 PR CRITICAL CARE, E/M 30-74 MINUTES: ICD-10-PCS | Mod: ,,, | Performed by: ANESTHESIOLOGY

## 2022-07-23 PROCEDURE — 27100171 HC OXYGEN HIGH FLOW UP TO 24 HOURS

## 2022-07-23 PROCEDURE — 99232 SBSQ HOSP IP/OBS MODERATE 35: CPT | Mod: ,,, | Performed by: NURSE PRACTITIONER

## 2022-07-23 PROCEDURE — 85025 COMPLETE CBC W/AUTO DIFF WBC: CPT | Performed by: NURSE PRACTITIONER

## 2022-07-23 PROCEDURE — 63600175 PHARM REV CODE 636 W HCPCS: Performed by: NURSE PRACTITIONER

## 2022-07-23 PROCEDURE — 25000003 PHARM REV CODE 250: Performed by: NURSE PRACTITIONER

## 2022-07-23 PROCEDURE — 82803 BLOOD GASES ANY COMBINATION: CPT

## 2022-07-23 PROCEDURE — 83735 ASSAY OF MAGNESIUM: CPT | Performed by: NURSE PRACTITIONER

## 2022-07-23 PROCEDURE — C9248 INJ, CLEVIDIPINE BUTYRATE: HCPCS | Mod: JG

## 2022-07-23 PROCEDURE — 63600175 PHARM REV CODE 636 W HCPCS: Mod: JG

## 2022-07-23 PROCEDURE — 84100 ASSAY OF PHOSPHORUS: CPT | Performed by: NURSE PRACTITIONER

## 2022-07-23 PROCEDURE — 99291 CRITICAL CARE FIRST HOUR: CPT | Mod: ,,, | Performed by: ANESTHESIOLOGY

## 2022-07-23 PROCEDURE — 97161 PT EVAL LOW COMPLEX 20 MIN: CPT

## 2022-07-23 PROCEDURE — 99900035 HC TECH TIME PER 15 MIN (STAT)

## 2022-07-23 PROCEDURE — 97530 THERAPEUTIC ACTIVITIES: CPT

## 2022-07-23 PROCEDURE — 85610 PROTHROMBIN TIME: CPT | Performed by: NURSE PRACTITIONER

## 2022-07-23 PROCEDURE — 94761 N-INVAS EAR/PLS OXIMETRY MLT: CPT

## 2022-07-23 PROCEDURE — 97535 SELF CARE MNGMENT TRAINING: CPT

## 2022-07-23 PROCEDURE — 97166 OT EVAL MOD COMPLEX 45 MIN: CPT

## 2022-07-23 RX ORDER — METOPROLOL TARTRATE 25 MG/1
12.5 TABLET ORAL 2 TIMES DAILY
Status: DISCONTINUED | OUTPATIENT
Start: 2022-07-23 | End: 2022-07-23

## 2022-07-23 RX ORDER — SODIUM CHLORIDE 9 MG/ML
INJECTION, SOLUTION INTRAVENOUS
Status: DISCONTINUED | OUTPATIENT
Start: 2022-07-23 | End: 2022-07-25

## 2022-07-23 RX ORDER — HYDROMORPHONE HCL IN 0.9% NACL 6 MG/30 ML
PATIENT CONTROLLED ANALGESIA SYRINGE INTRAVENOUS CONTINUOUS
Status: DISCONTINUED | OUTPATIENT
Start: 2022-07-23 | End: 2022-07-23

## 2022-07-23 RX ORDER — METOPROLOL TARTRATE 25 MG/1
25 TABLET, FILM COATED ORAL 2 TIMES DAILY
Status: DISCONTINUED | OUTPATIENT
Start: 2022-07-23 | End: 2022-07-23

## 2022-07-23 RX ORDER — AMLODIPINE BESYLATE 5 MG/1
5 TABLET ORAL DAILY
Status: DISCONTINUED | OUTPATIENT
Start: 2022-07-23 | End: 2022-07-23

## 2022-07-23 RX ORDER — NALOXONE HCL 0.4 MG/ML
0.02 VIAL (ML) INJECTION
Status: DISCONTINUED | OUTPATIENT
Start: 2022-07-23 | End: 2022-07-23

## 2022-07-23 RX ORDER — AMLODIPINE BESYLATE 5 MG/1
5 TABLET ORAL ONCE
Status: COMPLETED | OUTPATIENT
Start: 2022-07-23 | End: 2022-07-23

## 2022-07-23 RX ORDER — IBUPROFEN 200 MG
16 TABLET ORAL
Status: DISCONTINUED | OUTPATIENT
Start: 2022-07-23 | End: 2022-07-26

## 2022-07-23 RX ORDER — GLUCAGON 1 MG
1 KIT INJECTION
Status: DISCONTINUED | OUTPATIENT
Start: 2022-07-23 | End: 2022-07-26

## 2022-07-23 RX ORDER — METHOCARBAMOL 500 MG/1
500 TABLET, FILM COATED ORAL 4 TIMES DAILY
Status: DISCONTINUED | OUTPATIENT
Start: 2022-07-23 | End: 2022-07-27 | Stop reason: HOSPADM

## 2022-07-23 RX ORDER — IBUPROFEN 200 MG
24 TABLET ORAL
Status: DISCONTINUED | OUTPATIENT
Start: 2022-07-23 | End: 2022-07-26

## 2022-07-23 RX ORDER — AMLODIPINE BESYLATE 10 MG/1
10 TABLET ORAL DAILY
Status: DISCONTINUED | OUTPATIENT
Start: 2022-07-24 | End: 2022-07-24

## 2022-07-23 RX ORDER — INSULIN ASPART 100 [IU]/ML
0-5 INJECTION, SOLUTION INTRAVENOUS; SUBCUTANEOUS
Status: DISCONTINUED | OUTPATIENT
Start: 2022-07-23 | End: 2022-07-26

## 2022-07-23 RX ORDER — FUROSEMIDE 10 MG/ML
40 INJECTION INTRAMUSCULAR; INTRAVENOUS
Status: DISCONTINUED | OUTPATIENT
Start: 2022-07-23 | End: 2022-07-27 | Stop reason: HOSPADM

## 2022-07-23 RX ORDER — METOPROLOL TARTRATE 25 MG/1
12.5 TABLET ORAL 2 TIMES DAILY
Status: DISCONTINUED | OUTPATIENT
Start: 2022-07-23 | End: 2022-07-25

## 2022-07-23 RX ADMIN — METOPROLOL TARTRATE 12.5 MG: 25 TABLET, FILM COATED ORAL at 09:07

## 2022-07-23 RX ADMIN — IPRATROPIUM BROMIDE AND ALBUTEROL SULFATE 3 ML: .5; 3 SOLUTION RESPIRATORY (INHALATION) at 03:07

## 2022-07-23 RX ADMIN — METHOCARBAMOL 500 MG: 500 TABLET ORAL at 12:07

## 2022-07-23 RX ADMIN — Medication 2 G: at 04:07

## 2022-07-23 RX ADMIN — METHOCARBAMOL 500 MG: 500 TABLET ORAL at 04:07

## 2022-07-23 RX ADMIN — AMLODIPINE BESYLATE 5 MG: 5 TABLET ORAL at 09:07

## 2022-07-23 RX ADMIN — CLEVIPIDINE 5 MG/HR: 0.5 EMULSION INTRAVENOUS at 08:07

## 2022-07-23 RX ADMIN — HYDROMORPHONE HYDROCHLORIDE 1 MG: 1 INJECTION, SOLUTION INTRAMUSCULAR; INTRAVENOUS; SUBCUTANEOUS at 01:07

## 2022-07-23 RX ADMIN — OXYCODONE HYDROCHLORIDE 10 MG: 10 TABLET ORAL at 08:07

## 2022-07-23 RX ADMIN — IPRATROPIUM BROMIDE AND ALBUTEROL SULFATE 3 ML: .5; 3 SOLUTION RESPIRATORY (INHALATION) at 11:07

## 2022-07-23 RX ADMIN — AMLODIPINE BESYLATE 5 MG: 5 TABLET ORAL at 04:07

## 2022-07-23 RX ADMIN — CLEVIPIDINE 2 MG/HR: 0.5 EMULSION INTRAVENOUS at 06:07

## 2022-07-23 RX ADMIN — OXYCODONE HYDROCHLORIDE 10 MG: 10 TABLET ORAL at 03:07

## 2022-07-23 RX ADMIN — FAMOTIDINE 20 MG: 10 INJECTION, SOLUTION INTRAVENOUS at 08:07

## 2022-07-23 RX ADMIN — IPRATROPIUM BROMIDE AND ALBUTEROL SULFATE 3 ML: .5; 3 SOLUTION RESPIRATORY (INHALATION) at 12:07

## 2022-07-23 RX ADMIN — DOCUSATE SODIUM 100 MG: 100 CAPSULE ORAL at 08:07

## 2022-07-23 RX ADMIN — METHOCARBAMOL 500 MG: 500 TABLET ORAL at 09:07

## 2022-07-23 RX ADMIN — FUROSEMIDE 40 MG: 10 INJECTION, SOLUTION INTRAMUSCULAR; INTRAVENOUS at 07:07

## 2022-07-23 RX ADMIN — FLUTICASONE FUROATE AND VILANTEROL TRIFENATATE 1 PUFF: 200; 25 POWDER RESPIRATORY (INHALATION) at 07:07

## 2022-07-23 RX ADMIN — Medication 2 G: at 07:07

## 2022-07-23 RX ADMIN — ACETAMINOPHEN 1000 MG: 500 TABLET ORAL at 01:07

## 2022-07-23 RX ADMIN — ACETAMINOPHEN 1000 MG: 500 TABLET ORAL at 05:07

## 2022-07-23 RX ADMIN — ASPIRIN 325 MG ORAL TABLET 325 MG: 325 PILL ORAL at 08:07

## 2022-07-23 RX ADMIN — MUPIROCIN 1 G: 20 OINTMENT TOPICAL at 08:07

## 2022-07-23 RX ADMIN — Medication 2 G: at 01:07

## 2022-07-23 RX ADMIN — POLYETHYLENE GLYCOL 3350 17 G: 17 POWDER, FOR SOLUTION ORAL at 08:07

## 2022-07-23 RX ADMIN — IPRATROPIUM BROMIDE AND ALBUTEROL SULFATE 3 ML: .5; 3 SOLUTION RESPIRATORY (INHALATION) at 07:07

## 2022-07-23 RX ADMIN — METOPROLOL TARTRATE 12.5 MG: 25 TABLET, FILM COATED ORAL at 08:07

## 2022-07-23 RX ADMIN — DOCUSATE SODIUM 100 MG: 100 CAPSULE ORAL at 09:07

## 2022-07-23 RX ADMIN — ATORVASTATIN CALCIUM 40 MG: 20 TABLET, FILM COATED ORAL at 08:07

## 2022-07-23 RX ADMIN — OXYCODONE HYDROCHLORIDE 10 MG: 10 TABLET ORAL at 05:07

## 2022-07-23 RX ADMIN — CLEVIPIDINE 3 MG/HR: 0.5 EMULSION INTRAVENOUS at 06:07

## 2022-07-23 RX ADMIN — OXYCODONE HYDROCHLORIDE 10 MG: 10 TABLET ORAL at 01:07

## 2022-07-23 RX ADMIN — ACETAMINOPHEN 1000 MG: 500 TABLET ORAL at 09:07

## 2022-07-23 NOTE — PROGRESS NOTES
[facility] - [facility]  Critical Care - Surgery  Progress Note    Patient Name: Kong Celaya  MRN: [id number]  Admission Date: 7/22/2022  Hospital Length of Stay: 1 days  Code Status: Full Code  Attending Provider: Jet Gastelum MD  Primary Care Provider: Dax Thomas MD   Principal Problem: Moderate to severe mitral regurgitation    Subjective:     Hospital/ICU Course:  No notes on file    Interval History/Significant Events: Extubated and weaned off epi, started cleveprex. Reports significant back pain this morning and chest soreness.     Follow-up For: Procedure(s) (LRB):  Valvuloplasty, Mitral (N/A)    Post-Operative Day: 1 Day Post-Op    Objective:     Vital Signs (Most Recent):  Temp: 99.1 °F (37.3 °C) (07/23/22 0700)  Pulse: 76 (07/23/22 0915)  Resp: (!) 22 (07/23/22 0915)  BP: 122/60 (07/23/22 0913)  SpO2: (!) 94 % (07/23/22 0915)   Vital Signs (24h Range):  Temp:  [98 °F (36.7 °C)-99.1 °F (37.3 °C)] 99.1 °F (37.3 °C)  Pulse:  [66-83] 76  Resp:  [16-40] 22  SpO2:  [92 %-100 %] 94 %  BP: (115-148)/(58-84) 122/60  Arterial Line BP: ()/(55-82) 118/60     Weight: 134.7 kg (297 lb)  Body mass index is 42.62 kg/m².      Intake/Output Summary (Last 24 hours) at 7/23/2022 0953  Last data filed at 7/23/2022 0900  Gross per 24 hour   Intake 5021.26 ml   Output 4953 ml   Net 68.26 ml       Physical Exam  Vitals reviewed.   Constitutional:       General: He is not in acute distress.     Appearance: He is not ill-appearing.   HENT:      Head: Normocephalic and atraumatic.   Neck:      Comments: Right IJ CVC  Cardiovascular:      Rate and Rhythm: Normal rate and regular rhythm.      Comments: V-wires  Meds x2  Midline sternotomy incision  Pulmonary:      Effort: Pulmonary effort is normal. No respiratory distress.      Comments: On 4L by NC  Abdominal:      General: There is no distension.      Palpations: Abdomen is soft.   Genitourinary:     Comments: [place]   Musculoskeletal:       Comments: L ulnar art line   Neurological:      General: No focal deficit present.      Mental Status: He is alert and oriented to person, place, and time.       Vents:  Vent Mode: Spont (07/22/22 1745)  Ventilator Initiated: Yes (07/22/22 1403)  Set Rate: 26 BPM (07/22/22 1715)  Vt Set: 500 mL (07/22/22 1715)  Pressure Support: 8 cmH20 (07/22/22 1745)  PEEP/CPAP: 10 cmH20 (07/22/22 1745)  Oxygen Concentration (%): 40 (07/22/22 1745)  Peak Airway Pressure: 19 cmH2O (07/22/22 1745)  Plateau Pressure: 20 cmH20 (07/22/22 1745)  Total Ve: 15.6 mL (07/22/22 1745)  Negative Inspiratory Force (cm H2O): -30 (07/22/22 1745)  F/VT Ratio<105 (RSBI): (!) 47.04 (07/22/22 1745)    Lines/Drains/Airways       Central Venous Catheter Line  Duration              Introducer with Double Lumen 07/22/22 1038 right internal jugular <1 day    Percutaneous Central Line Insertion/Assessment - Triple Lumen  07/22/22 1038 right internal jugular <1 day              Drain  Duration                  Urethral Catheter 07/22/22 0740 Non-latex;Temperature probe;Straight-tip 14 Fr. 1 day         Y Chest Tube 1 and 2 07/22/22 1301 1 Right Mediastinal 19 Fr. 2 Left Mediastinal 19 Fr. <1 day              Arterial Line  Duration             Arterial Line 07/22/22 1031 Left Other (Comment) <1 day              Line  Duration                  Pacer Wires 07/22/22 1300 <1 day              Peripheral Intravenous Line  Duration                  Peripheral IV - Single Lumen 07/22/22 0618 18 G Left;Posterior Hand 1 day                    Significant Labs:    CBC/Anemia Profile:  Recent Labs   Lab 07/21/22  1131 07/22/22  0817 07/22/22  1407 07/22/22  1411 07/23/22  0327 07/23/22  0432 07/23/22  0839   WBC 8.55  --  34.42*  --   --  14.10*  --    HGB 15.6  --  15.4  --   --  12.4*  --    HCT 46.6   < > 45.6   < > 35* 37.1* 37     --  161  --   --  109*  --    MCV 99*  --  98  --   --  97  --    RDW 12.2  --  12.5  --   --  12.6  --     < > = values in this  interval not displayed.        Chemistries:  Recent Labs   Lab 07/21/22  1131 07/22/22  1548 07/22/22  2019 07/23/22  0432    138 141 137   K 4.3 5.9* 4.3 4.3    110 114* 106   CO2 30* 17* 21* 22*   BUN 11 16 15 14   CREATININE 0.9 1.1 0.9 0.8   CALCIUM 9.2 7.6* 7.1* 7.6*   ALBUMIN 3.7 2.9*  --  3.3*   PROT 7.0 5.8*  --  5.4*   BILITOT 0.5 1.3*  --  1.0   ALKPHOS 69 65  --  46*   ALT 76* 96*  --  59*   AST 41* 151*  --  89*   MG  --  1.9 1.9 1.9   PHOS  --  3.5 2.7 3.6       All pertinent labs within the past 24 hours have been reviewed.    Significant Imaging:  I have reviewed all pertinent imaging results/findings within the past 24 hours.    Assessment/Plan:     * Moderate to severe mitral regurgitation  Kong Celaya is a 52 y.o. male with medical problems including asthma/chronic bronchitis and severe mitral regurgitation now s/p MV repair and PFO closure on 7/22/22.      Neuro/Psych:   -- Pain: PCA today  -- Scheduled Tylenol; add robaxin today             Cards:   -- S/P MVR, PFO closure on 7/22/22  -- HDS off vasopressor and inotropic support  -- Ventricular pacing wires.  -- MAP 60-80, Syst < 130  -- Cleviprex PRN; adding amlodipine 5  --  mg QD, BB, statin     Pulm:   -- Goal O2 sat > 90%  -- Extubated on 4L by NC  -- ABG PRN  -- Duo-nebs Q4, Breo QD  -- Mediastinal chest tubes x2 to suction      Renal:  -- Keep alvarado for strict I/O  -- Trend BUN/Cr   -- Lasix 40 BID today      FEN / GI:   -- Replace lytes as needed  -- Nutrition: CLD, ADAT  -- GI ppx: famotidine  -- Bowel reg: miralax      ID:   -- Tm: afebrile; WBC stable  -- Kacy-op ancef      Heme/Onc:   -- H/H stable   -- Daily CBC  -- 1150 ml Cell saver given back  -- Aspirin 325mg QD      Endo:   -- BG goal 140-180  -- Insulin gtt      PPx:   Feeding: CLD, ADAT  Analgesia/Sedation: Propofol / PRN Oxy + Dilaudid  Thromboembolic prevention: SCDs  HOB >30: Yes  Stress Ulcer ppx: famotidine  Glucose control: Critical  care goal 140-180 g/dl, ISS     Lines/Drains/Airway: CVC RIJ, [place], Chest tubes x 2, ventricular pacing wires, L ulnar A-line      Dispo/Code Status/Palliative:   -- SICU / Full Code.          Harish Forrest MD  Critical Care - Surgery  [facility]

## 2022-07-23 NOTE — SUBJECTIVE & OBJECTIVE
"Interval HPI:   Overnight events: No acute events overnight. Patient on the SICU in room 99656 CVICU/24922 CVICU A. Blood glucose stable. BG at goal on current insulin regimen (IIP). Steroid use- None. 1 Day Post-Op  Renal function- Normal   Vasopressors-  None       Diet clear liquid     Eatin%  Nausea: No  Hypoglycemia and intervention: No  Fever: No  TPN and/or TF: No      /61   Pulse 69   Temp 99.1 °F (37.3 °C) (Oral)   Resp 17   Ht 5' 10" (1.778 m)   Wt 134.7 kg (297 lb)   SpO2 (!) 94%   BMI 42.62 kg/m²     Labs Reviewed and Include    Recent Labs   Lab 22   *   CALCIUM 7.6*   ALBUMIN 3.3*   PROT 5.4*      K 4.3   CO2 22*      BUN 14   CREATININE 0.8   ALKPHOS 46*   ALT 59*   AST 89*   BILITOT 1.0     Lab Results   Component Value Date    WBC 14.10 (H) 2022    HGB 12.4 (L) 2022    HCT 37 2022    MCV 97 2022     (L) 2022     No results for input(s): TSH, FREET4 in the last 168 hours.  Lab Results   Component Value Date    HGBA1C 5.4 2022       Nutritional status:   Body mass index is 42.62 kg/m².  Lab Results   Component Value Date    ALBUMIN 3.3 (L) 2022    ALBUMIN 2.9 (L) 2022    ALBUMIN 3.7 2022     No results found for: PREALBUMIN    Estimated Creatinine Clearance: 151 mL/min (based on SCr of 0.8 mg/dL).    Accu-Checks  Recent Labs     22  0009 22  0120 22  0207 22  0304 22  0329 22  0431 22  0503 22  0608 22  0711 22  0836   POCTGLUCOSE 144* 115* 117* 99 116* 130* 122* 124* 124* 149*       Current Medications and/or Treatments Impacting Glycemic Control  Immunotherapy:    Immunosuppressants       None          Steroids:   Hormones (From admission, onward)                None          Pressors:    Autonomic Drugs (From admission, onward)                Start     Stop Route Frequency Ordered    22 2100  metoprolol tartrate (LOPRESSOR) " split tablet 12.5 mg         -- Oral 2 times daily 07/23/22 0993          Hyperglycemia/Diabetes Medications:   Antihyperglycemics (From admission, onward)                Start     Stop Route Frequency Ordered    07/23/22 1005  insulin aspart U-100 pen 0-5 Units         -- SubQ Before meals & nightly PRN 07/23/22 0907

## 2022-07-23 NOTE — NURSING
"SICU PLAN OF CARE NOTE     Dx: Moderate to severe mitral regurgitation     Shift Events: Up to chair, Pain management,     Goals of Care: SYS<130, MAP 60-80     Neuro: AAO x4, Follows Commands and Moves All Extremities     Vital Signs: /63 (BP Location: Right arm, Patient Position: Lying)   Pulse 65   Resp (!) 20   Ht 5' 10" (1.778 m)   Wt 134.7 kg (297 lb)   SpO2 96%   BMI 42.62 kg/m²      Cardiac: NSR     Respiratory: Nasal Cannula     Diet: Cardiac, 1500 mL Fluid Rest.      Gtts: Cleviprex     Urine Output: Urinary Catheter    Drains: Chest Tube,      Labs/Accuchecks: Q4 accu.      Skin:  Incontinence pads in use. No new skin breakdown noted upon assessment.     Safety: Bed locked and in lowest position.       "

## 2022-07-23 NOTE — ASSESSMENT & PLAN NOTE
Kong Celaya is a 52 y.o. male with medical problems including asthma/chronic bronchitis and severe mitral regurgitation now s/p MV repair and PFO closure on 7/22/22.      Neuro/Psych:   -- Pain: PCA today  -- Scheduled Tylenol; add robaxin today             Cards:   -- S/P MVR, PFO closure on 7/22/22  -- HDS off vasopressor and inotropic support  -- Ventricular pacing wires.  -- MAP 60-80, Syst < 130  -- Cleviprex PRN; adding amlodipine 5  --  mg QD, BB, statin     Pulm:   -- Goal O2 sat > 90%  -- Extubated on 4L by NC  -- ABG PRN  -- Duo-nebs Q4, Breo QD  -- Mediastinal chest tubes x2 to suction      Renal:  -- Keep alvarado for strict I/O  -- Trend BUN/Cr   -- Lasix 40 BID today      FEN / GI:   -- Replace lytes as needed  -- Nutrition: CLD, ADAT  -- GI ppx: famotidine  -- Bowel reg: miralax      ID:   -- Tm: afebrile; WBC stable  -- Kacy-op ancef      Heme/Onc:   -- H/H stable   -- Daily CBC  -- 1150 ml Cell saver given back  -- Aspirin 325mg QD      Endo:   -- BG goal 140-180  -- Insulin gtt      PPx:   Feeding: CLD, ADAT  Analgesia/Sedation: Propofol / PRN Oxy + Dilaudid  Thromboembolic prevention: SCDs  HOB >30: Yes  Stress Ulcer ppx: famotidine  Glucose control: Critical care goal 140-180 g/dl, ISS     Lines/Drains/Airway: CVC ERICA Alvarado, Chest tubes x 2, ventricular pacing wires, L ulnar A-line      Dispo/Code Status/Palliative:   -- SICU / Full Code.

## 2022-07-23 NOTE — PLAN OF CARE
Problem: Occupational Therapy  Goal: Occupational Therapy Goal  Description: Goals to be met by: 8/6/22     Patient will increase functional independence with ADLs by performing:    Feeding with [place].  UE Dressing with [place].  LE Dressing with [place].  Grooming while standing at sink with [place].  Toileting from toilet with [place] for hygiene and clothing management.   Toilet transfer to toilet with [place].  Pt will engage in functional mobility to simulate household distances in order to maximize functional activity tolerance required for engagement in occupations of choice with supervision using LRAD.      Outcome: Ongoing, Progressing

## 2022-07-23 NOTE — PROGRESS NOTES
"[facility] - [facility]  Endocrinology  Progress Note    Admit Date: 2022     Reason for Consult: Management of Hyperglycemia     Surgical Procedure and Date: s/p MVR and PFO closure     Patient is not diabetic and does not currently take any oral/injectable antidiabetic/hypoglycemic medications.     Lab Results   Component Value Date    HGBA1C 5.4 2022         HPI: Kong Celaya is a 52 y.o. male who presents for MVr and PFO closure secondary to severe mitral regurgitation.  He has a medical history significant for asthma/bronchitis, mild diffuse coronary artery disease, elevated LFT's with liver hemangioma, and recently found severe mitral regurgitation with posterior leaflet prolapse. He reports that he recently found out about his mitral valve complication. He reports life limiting dyspnea on exertion, fatigue, and swelling. He reports swelling despite diuretic initiation.  He reports no longer being able to cut the grass due to SMART. Mr. Celaya is now s/p mitral valve repair and PFO closure on 22. The procedure was performed without apparent complication, however coming off pump, he was hyperkalemic with peaked T waves. He was given insulin and 40 lasix and his last K prior to transfer to SICU was 4.7. Endocrine consulted to manage post-operative hyperglycemia.             Interval HPI:   Overnight events: No acute events overnight. Patient on the SICU in room 33901 CVICU/74404 CVICU A. Blood glucose stable. BG at goal on current insulin regimen (IIP). Steroid use- None. 1 Day Post-Op  Renal function- Normal   Vasopressors-  None       Diet clear liquid     Eatin%  Nausea: No  Hypoglycemia and intervention: No  Fever: No  TPN and/or TF: No      /61   Pulse 69   Temp 99.1 °F (37.3 °C) (Oral)   Resp 17   Ht 5' 10" (1.778 m)   Wt 134.7 kg (297 lb)   SpO2 (!) 94%   BMI 42.62 kg/m²     Labs Reviewed and Include    Recent Labs   Lab 22  0432   * "   CALCIUM 7.6*   ALBUMIN 3.3*   PROT 5.4*      K 4.3   CO2 22*      BUN 14   CREATININE 0.8   ALKPHOS 46*   ALT 59*   AST 89*   BILITOT 1.0     Lab Results   Component Value Date    WBC 14.10 (H) 07/23/2022    HGB 12.4 (L) 07/23/2022    HCT 37 07/23/2022    MCV 97 07/23/2022     (L) 07/23/2022     No results for input(s): TSH, FREET4 in the last 168 hours.  Lab Results   Component Value Date    HGBA1C 5.4 07/21/2022       Nutritional status:   Body mass index is 42.62 kg/m².  Lab Results   Component Value Date    ALBUMIN 3.3 (L) 07/23/2022    ALBUMIN 2.9 (L) 07/22/2022    ALBUMIN 3.7 07/21/2022     No results found for: PREALBUMIN    Estimated Creatinine Clearance: 151 mL/min (based on SCr of 0.8 mg/dL).    Accu-Checks  Recent Labs     07/23/22  0009 07/23/22  0120 07/23/22  0207 07/23/22  0304 07/23/22  0329 07/23/22  0431 07/23/22  0503 07/23/22  0608 07/23/22  0711 07/23/22  0836   POCTGLUCOSE 144* 115* 117* 99 116* 130* 122* 124* 124* 149*       Current Medications and/or Treatments Impacting Glycemic Control  Immunotherapy:    Immunosuppressants       None          Steroids:   Hormones (From admission, onward)                None          Pressors:    Autonomic Drugs (From admission, onward)                Start     Stop Route Frequency Ordered    07/23/22 2100  metoprolol tartrate (LOPRESSOR) split tablet 12.5 mg         -- Oral 2 times daily 07/23/22 0956          Hyperglycemia/Diabetes Medications:   Antihyperglycemics (From admission, onward)                Start     Stop Route Frequency Ordered    07/23/22 1005  insulin aspart U-100 pen 0-5 Units         -- SubQ Before meals & nightly PRN 07/23/22 0905            ASSESSMENT and PLAN    * Moderate to severe mitral regurgitation  S/P MVR  Managed per primary team  Avoid hypoglycemia        Hyperglycemia  Endocrinology consulted for BG management.   BG goal 110-140 (CTS Protocol)    - D/C IIP  - Novolog (aspart) insulin LDC SSI (150/50)  Units SQ prn for BG excursions.   - BG checks AC/HS  - Hypoglycemia protocol in place    ** Please notify Endocrine for any change and/or advance in diet**  ** Please call Endocrine for any BG related issues **    Discharge Planning:   TBD. Please notify endocrinology prior to discharge.  e.        Surgical wound present  Optimize BG control to improve wound healing             Harish Landrum, DNP, FNP  Endocrinology  [facility] - Surgical Intensive Care

## 2022-07-23 NOTE — PLAN OF CARE
Problem: Adult Inpatient Plan of Care  Goal: Plan of Care Review  Outcome: Ongoing, Progressing  Goal: Patient-Specific Goal (Individualized)  Outcome: Ongoing, Progressing  Goal: Absence of Hospital-Acquired Illness or Injury  Outcome: Ongoing, Progressing  Goal: Optimal Comfort and Wellbeing  Outcome: Ongoing, Progressing     Problem: Bariatric Environmental Safety  Goal: Safety Maintained with Care  Outcome: Ongoing, Progressing     Problem: Activity Intolerance (Cardiovascular Surgery)  Goal: Improved Activity Tolerance  Outcome: Ongoing, Progressing     Problem: Bowel Motility Impaired (Cardiovascular Surgery)  Goal: Effective Bowel Elimination (Cardiovascular Surgery)  Outcome: Ongoing, Progressing     Problem: Glycemic Control Impaired (Cardiovascular Surgery)  Goal: Blood Glucose Level Within Targeted Range (Cardiovascular Surgery)  Outcome: Ongoing, Progressing     Problem: Pain (Cardiovascular Surgery)  Goal: Acceptable Pain Control  Outcome: Ongoing, Progressing     Problem: Skin Injury Risk Increased  Goal: Skin Health and Integrity  Outcome: Ongoing, Progressing
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
## 2022-07-23 NOTE — PLAN OF CARE
PT evaluation complete and appropriate goals established.    2022    Problem: Physical Therapy  Goal: Physical Therapy Goal  Description: Goals to be met by: 2022     Patient will increase functional independence with mobility by performin. Supine to sit with Supervision  2. Sit to stand transfer with Supervision  3. Gait  x 400 feet with Stand-by Assistance without AD.  4. Ascend/descend 4 stairs with L handrail with CGA.  5. Lower extremity exercise program x15 reps, with supervision, in order to increase LE strength and (I) with functional mobility.      Outcome: Ongoing, Progressing

## 2022-07-23 NOTE — PT/OT/SLP EVAL
"Occupational Therapy   Co-Evaluation/Treatment with PT    Name: Kong Celaya  MRN: [id number]  Admitting Diagnosis:  Moderate to severe mitral regurgitation  Recent Surgery: Procedure(s) (LRB):  Valvuloplasty, Mitral (N/A) 1 Day Post-Op    Recommendations:     Discharge Recommendations: home health OT  Discharge Equipment Recommendations:  none  Barriers to discharge:  None    Assessment:     Kong Celaya is a 52 y.o. male with a medical diagnosis of Moderate to severe mitral regurgitation.  He presents with good motivation and participation. Pt is s/p CABG 7/22/22. Pt tolerated session well, however is currently performing functional tasks below baseline. Performance deficits affecting function: weakness, impaired endurance, impaired self care skills, impaired functional mobility, gait instability, impaired balance, pain, decreased lower extremity function, impaired sensation, decreased upper extremity function, impaired cardiopulmonary response to activity. Pt would benefit from skilled OT services in order to maximize independence with ADLs and facilitate safe discharge. Pt would benefit from home health OT once medically stable for discharge.     Rehab Prognosis: Good; patient would benefit from acute skilled OT services to address these deficits and reach maximum level of function.       Plan:     Patient to be seen 5 x/week to address the above listed problems via self-care/home management, therapeutic activities, therapeutic exercises, neuromuscular re-education  · Plan of Care Expires: 08/22/22  · Plan of Care Reviewed with: patient, spouse    Subjective     Chief Complaint: "he has suspected sleep apnea and just falls asleep sometime" pt's wife states  Patient/Family Comments/goals: to return home    Occupational Profile:  Living Environment: Pt and wife in a camper with [address]  Previous level of function: independent  Roles and Routines: works as a   Equipment Used at Home:  " none  Assistance upon Discharge: Upon discharge, pt will have assistance from family    Pain/Comfort:  · Pain Rating 1: 6/10  · Pain Addressed 1: Pre-medicate for activity, Reposition, Distraction, Cessation of Activity    Patients cultural, spiritual, Taoist conflicts given the current situation: no    Objective:     Communicated with: RN and PT prior to session.  Patient found up in chair with pulse ox (continuous), telemetry, blood pressure cuff, chest tube, alvarado catheter, central line, arterial line, oxygen upon OT entry to room.    General Precautions: Standard, fall, sternal   Orthopedic Precautions:N/A   Braces: N/A  Respiratory Status: Nasal cannula, flow 4 L/min    Occupational Performance:    Bed Mobility:    · Did not observe    Functional Mobility/Transfers:  · Patient completed Sit <> Stand Transfer with contact guard assistance  with  hand-held assist   · Functional Mobility: Pt ambulated ~6 steps forward and backward x3 trials with CGA and HHA in order to maximize functional activity tolerance and standing balance required for engagement in occupations of choice.      Activities of Daily Living:  · Grooming: contact guard assistance to perform oral care and facial hygiene standing at bedside    Cognitive/Visual Perceptual:  Cognitive/Psychosocial Skills:     -       Oriented to: Person, Place, Time and Situation   -       Follows Commands/attention:Follows multistep  commands  -       Communication: clear/fluent  -       Memory: No Deficits noted  -       Safety awareness/insight to disability: intact   -       Mood/Affect/Coping skills/emotional control: Appropriate to situation    Physical Exam:  Sensation:    -       Impaired  L LE below the knee numbness   Dominant hand:    -       right  Upper Extremity Range of Motion:     -       Right Upper Extremity: WFL  -       Left Upper Extremity: WFL  Upper Extremity Strength: not formally tested 2' sternal precautions   Strength:    -        Right Upper Extremity: WFL  -       Left Upper Extremity: WFL  Fine Motor Coordination:    -       Intact    AMPAC 6 Click ADL:  AMPAC Total Score: 18    Treatment & Education:  -Therapist provided facilitation and instruction of proper body mechanics, energy conservation, and fall prevention strategies during tasks listed above.  -Pt educated on role of OT, POC and goals for therapy  · Pt educated on Post-op sternal precautions; no lifting > 5 lbs, pulling or pushing with BUEs. Able to move arms within a pain free range  -Pt educated on importance of OOB activities with staff member assistance and sitting OOB majority of the day.   -Pt verbalized understanding. Pt expressed no further concerns/questions  -Whiteboard updated  Evaluation completed with PT to best establish plan of care for acute setting.   Education:    Patient left up in chair with all lines intact, call button in reach, RN notified and wife present    GOALS:   Multidisciplinary Problems     Occupational Therapy Goals        Problem: Occupational Therapy    Goal Priority Disciplines Outcome Interventions   Occupational Therapy Goal     OT, PT/OT Ongoing, Progressing    Description: Goals to be met by: 8/6/22     Patient will increase functional independence with ADLs by performing:    Feeding with [place].  UE Dressing with [place].  LE Dressing with [place].  Grooming while standing at sink with [place].  Toileting from toilet with [place] for hygiene and clothing management.   Toilet transfer to toilet with [place].  Pt will engage in functional mobility to simulate household distances in order to maximize functional activity tolerance required for engagement in occupations of choice with supervision using LRAD.                       History:     Past Medical History:   Diagnosis Date    Arthritis     Asthma     Bronchitis     COPD (chronic obstructive pulmonary disease)     Coronary artery disease     Elevated  LFTs     GERD (gastroesophageal reflux disease)     Hepatitis B core antibody positive     PATIENT UNAWARE    Hernia of abdominal wall     Hx of colonic polyps     Liver hemangioma     Renal insufficiency        Past Surgical History:   Procedure Laterality Date    CHOLECYSTECTOMY      COLONOSCOPY N/A 4/12/2018    Procedure: COLONOSCOPY;  Surgeon: Sixto Robertson MD;  Location: [facility];  Service: Endoscopy;  Laterality: N/A;    LEFT HEART CATHETERIZATION Left 5/12/2022    Procedure: Left heart cath;  Surgeon: Hung Larsen MD;  Location: [facility];  Service: Cardiovascular;  Laterality: Left;    TONSILLECTOMY      TRANSESOPHAGEAL ECHOCARDIOGRAPHY N/A 5/12/2022    Procedure: ECHOCARDIOGRAM, TRANSESOPHAGEAL;  Surgeon: Hung Larsen MD;  Location: [facility];  Service: Cardiovascular;  Laterality: N/A;    UMBILICAL HERNIA REPAIR      UPPER GASTROINTESTINAL ENDOSCOPY         Time Tracking:     OT Date of Treatment: 07/23/22  OT Start Time: 1024  OT Stop Time: 1047  OT Total Time (min): 23 min    Billable Minutes:Evaluation 1 procedure  Self Care/Home Management 13 min    7/23/2022

## 2022-07-23 NOTE — PROGRESS NOTES
Cardiac Surgery Progress Note     MANOJ overnight  Extubated     Gtts  -     A/P  1 Day Post-Op s/p MVr, PFO closure     ASA, statin  BB  Lasix 40BID  Amlodipine  PRN pain meds  Cardiac diet, 1500ml fluid restriction  OOBTC  Keep chest tubes  Respiratory care    Shanae Fulton MD, PGY-6  Cardiothoracic Surgery   [phone]

## 2022-07-23 NOTE — PLAN OF CARE
"      SICU PLAN OF CARE NOTE    Dx: Moderate to severe mitral regurgitation    Shift Events: 500 of albumin. Weaned Epi to off    Goals of Care: SYS<130    Neuro: AAO x4, Follows Commands and Moves All Extremities    Vital Signs: /69 (BP Location: Right arm, Patient Position: Lying)   Pulse 66   Temp 98.5 °F (36.9 °C) (Oral)   Resp (!) 21   Ht 5' 10" (1.778 m)   Wt 134.7 kg (297 lb)   SpO2 97%   BMI 42.62 kg/m²     Cardiac: NSR    Respiratory: Nasal Cannula    Diet: Clear Liquid    Gtts: Cleviprex    Urine Output: Urinary Catheter 3430 cc/shift    Drains: Chest Tube, total output 410 cc /  shift     Labs/Accuchecks: Q1 accu. Q4 labs and ABG.    Skin: Bed plugged in and heel/sacral foams in place. Incontinence pads in use. No nee skin breakdown noted upon assessment.      "

## 2022-07-24 LAB
ALBUMIN SERPL BCP-MCNC: 3 G/DL (ref 3.5–5.2)
ALLENS TEST: ABNORMAL
ALP SERPL-CCNC: 59 U/L (ref 55–135)
ALT SERPL W/O P-5'-P-CCNC: 43 U/L (ref 10–44)
ANION GAP SERPL CALC-SCNC: 9 MMOL/L (ref 8–16)
AST SERPL-CCNC: 54 U/L (ref 10–40)
BASOPHILS # BLD AUTO: 0.05 K/UL (ref 0–0.2)
BASOPHILS NFR BLD: 0.3 % (ref 0–1.9)
BILIRUB SERPL-MCNC: 1 MG/DL (ref 0.1–1)
BUN SERPL-MCNC: 15 MG/DL (ref 6–20)
CALCIUM SERPL-MCNC: 8.1 MG/DL (ref 8.7–10.5)
CHLORIDE SERPL-SCNC: 101 MMOL/L (ref 95–110)
CO2 SERPL-SCNC: 25 MMOL/L (ref 23–29)
CREAT SERPL-MCNC: 0.7 MG/DL (ref 0.5–1.4)
DELSYS: ABNORMAL
DIFFERENTIAL METHOD: ABNORMAL
EOSINOPHIL # BLD AUTO: 0.1 K/UL (ref 0–0.5)
EOSINOPHIL NFR BLD: 0.3 % (ref 0–8)
ERYTHROCYTE [DISTWIDTH] IN BLOOD BY AUTOMATED COUNT: 12.2 % (ref 11.5–14.5)
EST. GFR  (AFRICAN AMERICAN): >60 ML/MIN/1.73 M^2
EST. GFR  (NON AFRICAN AMERICAN): >60 ML/MIN/1.73 M^2
FLOW: 7
GLUCOSE SERPL-MCNC: 125 MG/DL (ref 70–110)
HCO3 UR-SCNC: 28.6 MMOL/L (ref 24–28)
HCT VFR BLD AUTO: 34.1 % (ref 40–54)
HGB BLD-MCNC: 11.6 G/DL (ref 14–18)
IMM GRANULOCYTES # BLD AUTO: 0.11 K/UL (ref 0–0.04)
IMM GRANULOCYTES NFR BLD AUTO: 0.8 % (ref 0–0.5)
INR PPP: 1.1 (ref 0.8–1.2)
LYMPHOCYTES # BLD AUTO: 2.1 K/UL (ref 1–4.8)
LYMPHOCYTES NFR BLD: 14.3 % (ref 18–48)
MAGNESIUM SERPL-MCNC: 1.8 MG/DL (ref 1.6–2.6)
MCH RBC QN AUTO: 32.7 PG (ref 27–31)
MCHC RBC AUTO-ENTMCNC: 34 G/DL (ref 32–36)
MCV RBC AUTO: 96 FL (ref 82–98)
MODE: ABNORMAL
MONOCYTES # BLD AUTO: 1.5 K/UL (ref 0.3–1)
MONOCYTES NFR BLD: 10.6 % (ref 4–15)
NEUTROPHILS # BLD AUTO: 10.7 K/UL (ref 1.8–7.7)
NEUTROPHILS NFR BLD: 73.7 % (ref 38–73)
NRBC BLD-RTO: 0 /100 WBC
PCO2 BLDA: 39.7 MMHG (ref 35–45)
PH SMN: 7.46 [PH] (ref 7.35–7.45)
PHOSPHATE SERPL-MCNC: 1.9 MG/DL (ref 2.7–4.5)
PLATELET # BLD AUTO: 103 K/UL (ref 150–450)
PMV BLD AUTO: 11.6 FL (ref 9.2–12.9)
PO2 BLDA: 66 MMHG (ref 80–100)
POC BE: 5 MMOL/L
POC SATURATED O2: 94 % (ref 95–100)
POC TCO2: 30 MMOL/L (ref 23–27)
POCT GLUCOSE: 100 MG/DL (ref 70–110)
POCT GLUCOSE: 101 MG/DL (ref 70–110)
POCT GLUCOSE: 103 MG/DL (ref 70–110)
POCT GLUCOSE: 227 MG/DL (ref 70–110)
POTASSIUM SERPL-SCNC: 3.7 MMOL/L (ref 3.5–5.1)
POTASSIUM SERPL-SCNC: 3.9 MMOL/L (ref 3.5–5.1)
PROT SERPL-MCNC: 5.7 G/DL (ref 6–8.4)
PROTHROMBIN TIME: 10.9 SEC (ref 9–12.5)
RBC # BLD AUTO: 3.55 M/UL (ref 4.6–6.2)
SAMPLE: ABNORMAL
SITE: ABNORMAL
SODIUM SERPL-SCNC: 135 MMOL/L (ref 136–145)
WBC # BLD AUTO: 14.52 K/UL (ref 3.9–12.7)

## 2022-07-24 PROCEDURE — 25000003 PHARM REV CODE 250: Performed by: NURSE PRACTITIONER

## 2022-07-24 PROCEDURE — 94761 N-INVAS EAR/PLS OXIMETRY MLT: CPT

## 2022-07-24 PROCEDURE — 80053 COMPREHEN METABOLIC PANEL: CPT | Performed by: STUDENT IN AN ORGANIZED HEALTH CARE EDUCATION/TRAINING PROGRAM

## 2022-07-24 PROCEDURE — 27100171 HC OXYGEN HIGH FLOW UP TO 24 HOURS

## 2022-07-24 PROCEDURE — 83735 ASSAY OF MAGNESIUM: CPT | Performed by: NURSE PRACTITIONER

## 2022-07-24 PROCEDURE — 25000003 PHARM REV CODE 250

## 2022-07-24 PROCEDURE — 63600175 PHARM REV CODE 636 W HCPCS: Performed by: NURSE PRACTITIONER

## 2022-07-24 PROCEDURE — 94640 AIRWAY INHALATION TREATMENT: CPT

## 2022-07-24 PROCEDURE — 99900035 HC TECH TIME PER 15 MIN (STAT)

## 2022-07-24 PROCEDURE — 63600175 PHARM REV CODE 636 W HCPCS

## 2022-07-24 PROCEDURE — C9248 INJ, CLEVIDIPINE BUTYRATE: HCPCS | Mod: JG

## 2022-07-24 PROCEDURE — 84100 ASSAY OF PHOSPHORUS: CPT | Performed by: NURSE PRACTITIONER

## 2022-07-24 PROCEDURE — 93010 EKG 12-LEAD: ICD-10-PCS | Mod: ,,, | Performed by: INTERNAL MEDICINE

## 2022-07-24 PROCEDURE — 85610 PROTHROMBIN TIME: CPT | Performed by: NURSE PRACTITIONER

## 2022-07-24 PROCEDURE — 63600175 PHARM REV CODE 636 W HCPCS: Mod: JG

## 2022-07-24 PROCEDURE — 20000000 HC ICU ROOM

## 2022-07-24 PROCEDURE — 94799 UNLISTED PULMONARY SVC/PX: CPT

## 2022-07-24 PROCEDURE — 93005 ELECTROCARDIOGRAM TRACING: CPT

## 2022-07-24 PROCEDURE — 84132 ASSAY OF SERUM POTASSIUM: CPT | Performed by: THORACIC SURGERY (CARDIOTHORACIC VASCULAR SURGERY)

## 2022-07-24 PROCEDURE — 25000003 PHARM REV CODE 250: Performed by: STUDENT IN AN ORGANIZED HEALTH CARE EDUCATION/TRAINING PROGRAM

## 2022-07-24 PROCEDURE — 63600175 PHARM REV CODE 636 W HCPCS: Performed by: STUDENT IN AN ORGANIZED HEALTH CARE EDUCATION/TRAINING PROGRAM

## 2022-07-24 PROCEDURE — 99291 PR CRITICAL CARE, E/M 30-74 MINUTES: ICD-10-PCS | Mod: ,,, | Performed by: ANESTHESIOLOGY

## 2022-07-24 PROCEDURE — 85025 COMPLETE CBC W/AUTO DIFF WBC: CPT | Performed by: NURSE PRACTITIONER

## 2022-07-24 PROCEDURE — 99291 CRITICAL CARE FIRST HOUR: CPT | Mod: ,,, | Performed by: ANESTHESIOLOGY

## 2022-07-24 PROCEDURE — 93010 ELECTROCARDIOGRAM REPORT: CPT | Mod: ,,, | Performed by: INTERNAL MEDICINE

## 2022-07-24 PROCEDURE — 37799 UNLISTED PX VASCULAR SURGERY: CPT

## 2022-07-24 PROCEDURE — 25000242 PHARM REV CODE 250 ALT 637 W/ HCPCS

## 2022-07-24 PROCEDURE — 82803 BLOOD GASES ANY COMBINATION: CPT

## 2022-07-24 RX ORDER — IPRATROPIUM BROMIDE AND ALBUTEROL SULFATE 2.5; .5 MG/3ML; MG/3ML
3 SOLUTION RESPIRATORY (INHALATION) ONCE
Status: DISCONTINUED | OUTPATIENT
Start: 2022-07-24 | End: 2022-07-25

## 2022-07-24 RX ORDER — SODIUM,POTASSIUM PHOSPHATES 280-250MG
2 POWDER IN PACKET (EA) ORAL ONCE
Status: COMPLETED | OUTPATIENT
Start: 2022-07-24 | End: 2022-07-24

## 2022-07-24 RX ORDER — IPRATROPIUM BROMIDE AND ALBUTEROL SULFATE 2.5; .5 MG/3ML; MG/3ML
SOLUTION RESPIRATORY (INHALATION)
Status: COMPLETED
Start: 2022-07-24 | End: 2022-07-24

## 2022-07-24 RX ORDER — NIFEDIPINE 30 MG/1
30 TABLET, EXTENDED RELEASE ORAL DAILY
Status: DISCONTINUED | OUTPATIENT
Start: 2022-07-24 | End: 2022-07-25

## 2022-07-24 RX ADMIN — MAGNESIUM SULFATE HEPTAHYDRATE 2 G: 40 INJECTION, SOLUTION INTRAVENOUS at 05:07

## 2022-07-24 RX ADMIN — POTASSIUM & SODIUM PHOSPHATES POWDER PACK 280-160-250 MG 2 PACKET: 280-160-250 PACK at 06:07

## 2022-07-24 RX ADMIN — MUPIROCIN 1 G: 20 OINTMENT TOPICAL at 08:07

## 2022-07-24 RX ADMIN — ACETAMINOPHEN 1000 MG: 500 TABLET ORAL at 02:07

## 2022-07-24 RX ADMIN — AMIODARONE HYDROCHLORIDE 1 MG/MIN: 1.8 INJECTION, SOLUTION INTRAVENOUS at 09:07

## 2022-07-24 RX ADMIN — POTASSIUM & SODIUM PHOSPHATES POWDER PACK 280-160-250 MG 2 PACKET: 280-160-250 PACK at 09:07

## 2022-07-24 RX ADMIN — CLEVIPIDINE 7 MG/HR: 0.5 EMULSION INTRAVENOUS at 06:07

## 2022-07-24 RX ADMIN — IPRATROPIUM BROMIDE AND ALBUTEROL SULFATE 3 ML: .5; 3 SOLUTION RESPIRATORY (INHALATION) at 04:07

## 2022-07-24 RX ADMIN — FUROSEMIDE 40 MG: 10 INJECTION, SOLUTION INTRAMUSCULAR; INTRAVENOUS at 06:07

## 2022-07-24 RX ADMIN — OXYCODONE HYDROCHLORIDE 10 MG: 10 TABLET ORAL at 02:07

## 2022-07-24 RX ADMIN — Medication 2 G: at 03:07

## 2022-07-24 RX ADMIN — POLYETHYLENE GLYCOL 3350 17 G: 17 POWDER, FOR SOLUTION ORAL at 08:07

## 2022-07-24 RX ADMIN — DOCUSATE SODIUM 100 MG: 100 CAPSULE ORAL at 08:07

## 2022-07-24 RX ADMIN — FAMOTIDINE 20 MG: 10 INJECTION, SOLUTION INTRAVENOUS at 08:07

## 2022-07-24 RX ADMIN — OXYCODONE HYDROCHLORIDE 10 MG: 10 TABLET ORAL at 12:07

## 2022-07-24 RX ADMIN — METHOCARBAMOL 500 MG: 500 TABLET ORAL at 08:07

## 2022-07-24 RX ADMIN — POTASSIUM CHLORIDE 20 MEQ: 14.9 INJECTION, SOLUTION INTRAVENOUS at 05:07

## 2022-07-24 RX ADMIN — AMIODARONE HYDROCHLORIDE 150 MG: 1.5 INJECTION, SOLUTION INTRAVENOUS at 09:07

## 2022-07-24 RX ADMIN — METOPROLOL TARTRATE 12.5 MG: 25 TABLET, FILM COATED ORAL at 08:07

## 2022-07-24 RX ADMIN — METOPROLOL TARTRATE 12.5 MG: 25 TABLET, FILM COATED ORAL at 09:07

## 2022-07-24 RX ADMIN — CLEVIPIDINE 1 MG/HR: 0.5 EMULSION INTRAVENOUS at 07:07

## 2022-07-24 RX ADMIN — METHOCARBAMOL 500 MG: 500 TABLET ORAL at 05:07

## 2022-07-24 RX ADMIN — HYDROMORPHONE HYDROCHLORIDE 2 MG: 2 INJECTION, SOLUTION INTRAMUSCULAR; INTRAVENOUS; SUBCUTANEOUS at 06:07

## 2022-07-24 RX ADMIN — ASPIRIN 325 MG ORAL TABLET 325 MG: 325 PILL ORAL at 08:07

## 2022-07-24 RX ADMIN — NIFEDIPINE 30 MG: 30 TABLET, FILM COATED, EXTENDED RELEASE ORAL at 08:07

## 2022-07-24 RX ADMIN — METHOCARBAMOL 500 MG: 500 TABLET ORAL at 12:07

## 2022-07-24 RX ADMIN — FUROSEMIDE 40 MG: 10 INJECTION, SOLUTION INTRAMUSCULAR; INTRAVENOUS at 07:07

## 2022-07-24 RX ADMIN — INSULIN ASPART 1 UNITS: 100 INJECTION, SOLUTION INTRAVENOUS; SUBCUTANEOUS at 08:07

## 2022-07-24 RX ADMIN — ACETAMINOPHEN 1000 MG: 500 TABLET ORAL at 05:07

## 2022-07-24 RX ADMIN — FLUTICASONE FUROATE AND VILANTEROL TRIFENATATE 1 PUFF: 200; 25 POWDER RESPIRATORY (INHALATION) at 09:07

## 2022-07-24 RX ADMIN — ATORVASTATIN CALCIUM 40 MG: 20 TABLET, FILM COATED ORAL at 08:07

## 2022-07-24 NOTE — NURSING
Sats in low 90s. ABG drawn. Sats 94 on gas. See flow sheets for results. Per Dr. Forrest Sats >88%. Pt to remain on 7L HFNC

## 2022-07-24 NOTE — SUBJECTIVE & OBJECTIVE
Interval History/Significant Events: Difficulty with blood pressure control requiring cleveprex ggt despite amlodipine PO. Increasing oxygen requirement while sleeping overnight. Pain controlled.     Follow-up For: Procedure(s) (LRB):  Valvuloplasty, Mitral (N/A)    Post-Operative Day: 2 Days Post-Op    Objective:     Vital Signs (Most Recent):  Temp: 99 °F (37.2 °C) (07/24/22 0700)  Pulse: 68 (07/24/22 0715)  Resp: 18 (07/24/22 0715)  BP: (!) 157/83 (07/24/22 0700)  SpO2: (!) 89 % (07/24/22 0715)   Vital Signs (24h Range):  Temp:  [96.8 °F (36 °C)-99 °F (37.2 °C)] 99 °F (37.2 °C)  Pulse:  [61-77] 68  Resp:  [14-35] 18  SpO2:  [89 %-99 %] 89 %  BP: (119-157)/(60-84) 157/83  Arterial Line BP: ()/(55-82) 113/63     Weight: 134.7 kg (297 lb)  Body mass index is 42.62 kg/m².      Intake/Output Summary (Last 24 hours) at 7/24/2022 0802  Last data filed at 7/24/2022 0700  Gross per 24 hour   Intake 599.7 ml   Output 3395 ml   Net -2795.3 ml       Physical Exam  Vitals reviewed.   Constitutional:       General: He is not in acute distress.     Appearance: He is not ill-appearing.   HENT:      Head: Normocephalic and atraumatic.   Neck:      Comments: Right IJ CVC  Cardiovascular:      Rate and Rhythm: Normal rate and regular rhythm.      Comments: V-wires  Meds x2  Midline sternotomy incision  Pulmonary:      Effort: Pulmonary effort is normal. No respiratory distress.      Comments: On 7L by NC  Abdominal:      General: There is no distension.      Palpations: Abdomen is soft.   Genitourinary:     Comments: [place]   Musculoskeletal:      Comments: L ulnar art line   Neurological:      General: No focal deficit present.      Mental Status: He is alert and oriented to person, place, and time.       Vents:  Vent Mode: Spont (07/22/22 1745)  Ventilator Initiated: Yes (07/22/22 1403)  Set Rate: 26 BPM (07/22/22 1715)  Vt Set: 500 mL (07/22/22 1715)  Pressure Support: 8 cmH20 (07/22/22 1745)  PEEP/CPAP: 10 cmH20 (07/22/22  1745)  Oxygen Concentration (%): 40 (07/22/22 1745)  Peak Airway Pressure: 19 cmH2O (07/22/22 1745)  Plateau Pressure: 20 cmH20 (07/22/22 1745)  Total Ve: 15.6 mL (07/22/22 1745)  Negative Inspiratory Force (cm H2O): -30 (07/22/22 1745)  F/VT Ratio<105 (RSBI): (!) 47.04 (07/22/22 1745)    Lines/Drains/Airways       Central Venous Catheter Line  Duration              Introducer with Double Lumen 07/22/22 1038 right internal jugular 1 day    Percutaneous Central Line Insertion/Assessment - Triple Lumen  07/22/22 1038 right internal jugular 1 day              Drain  Duration                  Urethral Catheter 07/22/22 0740 Non-latex;Temperature probe;Straight-tip 14 Fr. 2 days         Y Chest Tube 1 and 2 07/22/22 1301 1 Right Mediastinal 19 Fr. 2 Left Mediastinal 19 Fr. 1 day              Arterial Line  Duration             Arterial Line 07/22/22 1031 Left Other (Comment) 1 day              Line  Duration                  Pacer Wires 07/22/22 1300 1 day              Peripheral Intravenous Line  Duration                  Peripheral IV - Single Lumen 07/22/22 0618 18 G Left;Posterior Hand 2 days                    Significant Labs:    CBC/Anemia Profile:  Recent Labs   Lab 07/22/22  1407 07/22/22  1411 07/23/22  0432 07/23/22  0839 07/24/22  0307   WBC 34.42*  --  14.10*  --  14.52*   HGB 15.4  --  12.4*  --  11.6*   HCT 45.6   < > 37.1* 37 34.1*     --  109*  --  103*   MCV 98  --  97  --  96   RDW 12.5  --  12.6  --  12.2    < > = values in this interval not displayed.        Chemistries:  Recent Labs   Lab 07/22/22  1548 07/22/22  2019 07/23/22  0432 07/23/22  1623 07/24/22  0307    141 137  --  135*   K 5.9* 4.3 4.3 4.0 3.7    114* 106  --  101   CO2 17* 21* 22*  --  25   BUN 16 15 14  --  15   CREATININE 1.1 0.9 0.8  --  0.7   CALCIUM 7.6* 7.1* 7.6*  --  8.1*   ALBUMIN 2.9*  --  3.3*  --  3.0*   PROT 5.8*  --  5.4*  --  5.7*   BILITOT 1.3*  --  1.0  --  1.0   ALKPHOS 65  --  46*  --  59   ALT 96*   --  59*  --  43   *  --  89*  --  54*   MG 1.9 1.9 1.9  --  1.8   PHOS 3.5 2.7 3.6  --  1.9*       All pertinent labs within the past 24 hours have been reviewed.    Significant Imaging:  I have reviewed all pertinent imaging results/findings within the past 24 hours.

## 2022-07-24 NOTE — ASSESSMENT & PLAN NOTE
Kong Celaya is a 52 y.o. male with medical problems including asthma/chronic bronchitis and severe mitral regurgitation now s/p MV repair and PFO closure on 7/22/22.      Neuro/Psych:   -- Pain: Oxy plus dilaudid prn  -- Scheduled Tylenol; add robaxin today             Cards:   -- S/P MVR, PFO closure on 7/22/22  -- HDS off vasopressor and inotropic support  -- Ventricular pacing wires.  -- MAP 60-80, Syst < 130  -- Cleviprex PRN; Nifedipine 30 QD  --  mg QD, BB, statin     Pulm:   -- Goal O2 sat > 90%  -- Extubated on 7L by NC, only increasing requirement while sleeping   -- ABG PRN  -- Duo-nebs Q4, Breo QD  -- Mediastinal chest tubes x2 to suction      Renal:  -- Keep alvarado for strict I/O  -- Trend BUN/Cr   -- Lasix 40 BID today      FEN / GI:   -- Replace lytes as needed  -- Nutrition: CLD, ADAT  -- GI ppx: famotidine  -- Bowel reg: miralax      ID:   -- Tm: afebrile; WBC stable  -- Kacy-op ancef      Heme/Onc:   -- H/H stable   -- Daily CBC  -- 1150 ml Cell saver given back  -- Aspirin 325mg QD      Endo:   -- BG goal 140-180  -- Insulin gtt      PPx:   Feeding: Cardiac diet, 1500 cc fluid restriction   Analgesia/Sedation: Propofol / PRN Oxy + Dilaudid  Thromboembolic prevention: SCDs  HOB >30: Yes  Stress Ulcer ppx: famotidine  Glucose control: Critical care goal 140-180 g/dl, ISS     Lines/Drains/Airway: CVC RIJ, Alvarado, Chest tubes x 2, ventricular pacing wires, L ulnar A-line      Dispo/Code Status/Palliative:   -- SICU / Full Code.

## 2022-07-24 NOTE — PROGRESS NOTES
[facility]  Critical Care - Surgery  Progress Note    Patient Name: Kong Celaya  MRN: [id number]  Admission Date: 7/22/2022  Hospital Length of Stay: 2 days  Code Status: Full Code  Attending Provider: Jet Gastelum MD  Primary Care Provider: Dax Thomas MD   Principal Problem: Moderate to severe mitral regurgitation    Subjective:     Hospital/ICU Course:  No notes on file    Interval History/Significant Events: Difficulty with blood pressure control requiring cleveprex ggt despite amlodipine PO. Increasing oxygen requirement while sleeping overnight. Pain controlled.     Follow-up For: Procedure(s) (LRB):  Valvuloplasty, Mitral (N/A)    Post-Operative Day: 2 Days Post-Op    Objective:     Vital Signs (Most Recent):  Temp: 99 °F (37.2 °C) (07/24/22 0700)  Pulse: 68 (07/24/22 0715)  Resp: 18 (07/24/22 0715)  BP: (!) 157/83 (07/24/22 0700)  SpO2: (!) 89 % (07/24/22 0715)   Vital Signs (24h Range):  Temp:  [96.8 °F (36 °C)-99 °F (37.2 °C)] 99 °F (37.2 °C)  Pulse:  [61-77] 68  Resp:  [14-35] 18  SpO2:  [89 %-99 %] 89 %  BP: (119-157)/(60-84) 157/83  Arterial Line BP: ()/(55-82) 113/63     Weight: 134.7 kg (297 lb)  Body mass index is 42.62 kg/m².      Intake/Output Summary (Last 24 hours) at 7/24/2022 0802  Last data filed at 7/24/2022 0700  Gross per 24 hour   Intake 599.7 ml   Output 3395 ml   Net -2795.3 ml       Physical Exam  Vitals reviewed.   Constitutional:       General: He is not in acute distress.     Appearance: He is not ill-appearing.   HENT:      Head: Normocephalic and atraumatic.   Neck:      Comments: Right IJ CVC  Cardiovascular:      Rate and Rhythm: Normal rate and regular rhythm.      Comments: V-wires  Meds x2  Midline sternotomy incision  Pulmonary:      Effort: Pulmonary effort is normal. No respiratory distress.      Comments: On 7L by NC  Abdominal:      General: There is no distension.      Palpations: Abdomen is soft.   Genitourinary:      Comments: [place]   Musculoskeletal:      Comments: L ulnar art line   Neurological:      General: No focal deficit present.      Mental Status: He is alert and oriented to person, place, and time.       Vents:  Vent Mode: Spont (07/22/22 1745)  Ventilator Initiated: Yes (07/22/22 1403)  Set Rate: 26 BPM (07/22/22 1715)  Vt Set: 500 mL (07/22/22 1715)  Pressure Support: 8 cmH20 (07/22/22 1745)  PEEP/CPAP: 10 cmH20 (07/22/22 1745)  Oxygen Concentration (%): 40 (07/22/22 1745)  Peak Airway Pressure: 19 cmH2O (07/22/22 1745)  Plateau Pressure: 20 cmH20 (07/22/22 1745)  Total Ve: 15.6 mL (07/22/22 1745)  Negative Inspiratory Force (cm H2O): -30 (07/22/22 1745)  F/VT Ratio<105 (RSBI): (!) 47.04 (07/22/22 1745)    Lines/Drains/Airways       Central Venous Catheter Line  Duration              Introducer with Double Lumen 07/22/22 1038 right internal jugular 1 day    Percutaneous Central Line Insertion/Assessment - Triple Lumen  07/22/22 1038 right internal jugular 1 day              Drain  Duration                  Urethral Catheter 07/22/22 0740 Non-latex;Temperature probe;Straight-tip 14 Fr. 2 days         Y Chest Tube 1 and 2 07/22/22 1301 1 Right Mediastinal 19 Fr. 2 Left Mediastinal 19 Fr. 1 day              Arterial Line  Duration             Arterial Line 07/22/22 1031 Left Other (Comment) 1 day              Line  Duration                  Pacer Wires 07/22/22 1300 1 day              Peripheral Intravenous Line  Duration                  Peripheral IV - Single Lumen 07/22/22 0618 18 G Left;Posterior Hand 2 days                    Significant Labs:    CBC/Anemia Profile:  Recent Labs   Lab 07/22/22  1407 07/22/22  1411 07/23/22  0432 07/23/22  0839 07/24/22  0307   WBC 34.42*  --  14.10*  --  14.52*   HGB 15.4  --  12.4*  --  11.6*   HCT 45.6   < > 37.1* 37 34.1*     --  109*  --  103*   MCV 98  --  97  --  96   RDW 12.5  --  12.6  --  12.2    < > = values in this interval not displayed.         Chemistries:  Recent Labs   Lab 07/22/22  1548 07/22/22 2019 07/23/22  0432 07/23/22  1623 07/24/22  0307    141 137  --  135*   K 5.9* 4.3 4.3 4.0 3.7    114* 106  --  101   CO2 17* 21* 22*  --  25   BUN 16 15 14  --  15   CREATININE 1.1 0.9 0.8  --  0.7   CALCIUM 7.6* 7.1* 7.6*  --  8.1*   ALBUMIN 2.9*  --  3.3*  --  3.0*   PROT 5.8*  --  5.4*  --  5.7*   BILITOT 1.3*  --  1.0  --  1.0   ALKPHOS 65  --  46*  --  59   ALT 96*  --  59*  --  43   *  --  89*  --  54*   MG 1.9 1.9 1.9  --  1.8   PHOS 3.5 2.7 3.6  --  1.9*       All pertinent labs within the past 24 hours have been reviewed.    Significant Imaging:  I have reviewed all pertinent imaging results/findings within the past 24 hours.    Assessment/Plan:     * Moderate to severe mitral regurgitation  Kong Celaya is a 52 y.o. male with medical problems including asthma/chronic bronchitis and severe mitral regurgitation now s/p MV repair and PFO closure on 7/22/22.      Neuro/Psych:   -- Pain: Oxy plus dilaudid prn  -- Scheduled Tylenol; add robaxin today             Cards:   -- S/P MVR, PFO closure on 7/22/22  -- HDS off vasopressor and inotropic support  -- Ventricular pacing wires.  -- MAP 60-80, Syst < 130  -- Cleviprex PRN; Nifedipine 30 QD  --  mg QD, BB, statin     Pulm:   -- Goal O2 sat > 90%  -- Extubated on 7L by NC, only increasing requirement while sleeping   -- ABG PRN  -- Duo-nebs Q4, Breo QD  -- Mediastinal chest tubes x2 to suction      Renal:  -- Keep alvarado for strict I/O  -- Trend BUN/Cr   -- Lasix 40 BID today      FEN / GI:   -- Replace lytes as needed  -- Nutrition: Cardiac diet  -- GI ppx: famotidine  -- Bowel reg: miralax      ID:   -- Tm: afebrile; WBC stable  -- Kacy-op ancef      Heme/Onc:   -- H/H stable   -- Daily CBC  -- 1150 ml Cell saver given back  -- Aspirin 325mg QD      Endo:   -- BG goal 140-180  -- Insulin gtt      PPx:   Feeding: Cardiac diet, 1500 cc fluid restriction    Analgesia/Sedation: Propofol / PRN Oxy + Dilaudid  Thromboembolic prevention: SCDs  HOB >30: Yes  Stress Ulcer ppx: famotidine  Glucose control: Critical care goal 140-180 g/dl, ISS     Lines/Drains/Airway: CVC RIJ, [place], Chest tubes x 2, ventricular pacing wires, L ulnar A-line      Dispo/Code Status/Palliative:   -- SICU / Full Code.        Harish Forrest MD  Critical Care - Surgery  [facility]

## 2022-07-24 NOTE — NURSING
"      SICU PLAN OF CARE NOTE    Dx: Moderate to severe mitral regurgitation    Shift Events: NAEON - OOBTC this AM    Goals of Care: MAPs 60-80. SBP <130, O2 sats >88%    Neuro: AAO x4, Follows Commands and Moves All Extremities    Vital Signs: BP (!) 145/75 (BP Location: Right arm, Patient Position: Lying)   Pulse 65   Temp 96.8 °F (36 °C) (Axillary)   Resp 17   Ht 5' 10" (1.778 m)   Wt 134.7 kg (297 lb)   SpO2 (!) 92%   BMI 42.62 kg/m²     Respiratory: HFNC 7L    Diet: Cardiac Diet, 1500cc FR    Gtts: Cleviprex    Urine Output: Urinary Catheter 2080 cc/shift    Drains: 2 Meds CTs: 120cc/shift     Labs/Accuchecks: Accuchecks ACHS       "

## 2022-07-24 NOTE — CARE UPDATE
Care Update:     No acute events overnight. Patient on the SICU in room 16759 CVICU/89340 CVICU A. Blood glucose stable. BG at and below goal on current insulin regimen (SSI ). No exogenous insulin required. Steroid use- None. 2 Days Post-Op  Renal function- Normal   Vasopressors-  None       Diet Cardiac Fluid - 1500mL     POCT Glucose   Date Value Ref Range Status   07/24/2022 100 70 - 110 mg/dL Final   07/23/2022 125 (H) 70 - 110 mg/dL Final   07/23/2022 113 (H) 70 - 110 mg/dL Final   07/23/2022 96 70 - 110 mg/dL Final   07/23/2022 149 (H) 70 - 110 mg/dL Final   07/23/2022 124 (H) 70 - 110 mg/dL Final   07/23/2022 124 (H) 70 - 110 mg/dL Final   07/23/2022 122 (H) 70 - 110 mg/dL Final   07/23/2022 130 (H) 70 - 110 mg/dL Final   07/23/2022 116 (H) 70 - 110 mg/dL Final   07/23/2022 99 70 - 110 mg/dL Final   07/23/2022 117 (H) 70 - 110 mg/dL Final   07/23/2022 115 (H) 70 - 110 mg/dL Final   07/23/2022 144 (H) 70 - 110 mg/dL Final   07/22/2022 144 (H) 70 - 110 mg/dL Final   07/22/2022 150 (H) 70 - 110 mg/dL Final   07/22/2022 150 (H) 70 - 110 mg/dL Final   07/22/2022 141 (H) 70 - 110 mg/dL Final   07/22/2022 157 (H) 70 - 110 mg/dL Final   07/22/2022 229 (H) 70 - 110 mg/dL Final   07/22/2022 169 (H) 70 - 110 mg/dL Final   07/22/2022 175 (H) 70 - 110 mg/dL Final   07/22/2022 175 (H) 70 - 110 mg/dL Final   07/22/2022 178 (H) 70 - 110 mg/dL Final     Lab Results   Component Value Date    HGBA1C 5.4 07/21/2022       Endocrinology consulted for BG management.   BG goal 140-180    Hospital Medications   BG checks AC/HS            glucagon (human recombinant) injection 1 mg 1 mg, Intramuscular, As needed (PRN), Turn patient on their side, give IM, and NOTIFY MD IMMEDIATELY.<BR><BR>Feed the patient as soon as patient awakens and is able to swallow.    glucose chewable tablet 16 g 16 g, Oral, As needed (PRN), (16 grams = #  four 4gm glucose tablets)    glucose chewable tablet 24 g 24 g, Oral, As needed (PRN), (24 grams = # six  4gm glucose tablets)    insulin aspart U-100 pen 0-5 Units 0-5 Units, Subcutaneous, Before meals &amp; nightly PRN, **LOW CORRECTION DOSE**<BR>Blood Glucose<BR>mg/dL                  Pre-meal                2200<BR>151-200                0 unit                      0 unit<BR>201-250                2 units                    1 unit<BR>251-300                3 units                    1 unit<BR>301-350                4 units                    2 units<BR>&gt;350                     5 units                    3 units<BR>Administer subcutaneously if needed at times designated by monitoring schedule. <BR>DO NOT HOLD correction dose insulin for patients who are  NPO.<BR>&quot;HIGH ALERT MEDICATION&quot; - Administer with meals or TF/TPN.          *Will likely sign off tomorrow.     ** Please notify Endocrine for any change and/or advance in diet**  ** Please call Endocrine for any BG related issues **    Discharge Planning:   TBD. Please notify endocrinology prior to discharge.      Harish Landrum DNP, FNP-C  Department of Endocrinology  Inpatient Glycemic Management

## 2022-07-24 NOTE — PROGRESS NOTES
Cardiac Surgery Progress Note     MANOJ overnight     Gtts  Cleviprex 5     A/P  2 Days Post-Op s/p MVr, PFO closure     ASA, statin  BB  Lasix 40BID  Nifedipine  PRN pain meds  Cardiac diet, 1500ml fluid restriction  OOBTC  Keep chest tubes  Respiratory care    Shanae Fulton MD, PGY-6  Cardiothoracic Surgery   [phone]

## 2022-07-25 LAB
ALBUMIN SERPL BCP-MCNC: 2.8 G/DL (ref 3.5–5.2)
ALP SERPL-CCNC: 111 U/L (ref 55–135)
ALT SERPL W/O P-5'-P-CCNC: 84 U/L (ref 10–44)
ANION GAP SERPL CALC-SCNC: 9 MMOL/L (ref 8–16)
AST SERPL-CCNC: 119 U/L (ref 10–40)
BASOPHILS # BLD AUTO: 0.06 K/UL (ref 0–0.2)
BASOPHILS NFR BLD: 0.6 % (ref 0–1.9)
BILIRUB SERPL-MCNC: 0.8 MG/DL (ref 0.1–1)
BUN SERPL-MCNC: 17 MG/DL (ref 6–20)
CALCIUM SERPL-MCNC: 8.1 MG/DL (ref 8.7–10.5)
CHLORIDE SERPL-SCNC: 96 MMOL/L (ref 95–110)
CO2 SERPL-SCNC: 29 MMOL/L (ref 23–29)
CREAT SERPL-MCNC: 0.7 MG/DL (ref 0.5–1.4)
DIFFERENTIAL METHOD: ABNORMAL
EOSINOPHIL # BLD AUTO: 0.1 K/UL (ref 0–0.5)
EOSINOPHIL NFR BLD: 1.2 % (ref 0–8)
ERYTHROCYTE [DISTWIDTH] IN BLOOD BY AUTOMATED COUNT: 12.1 % (ref 11.5–14.5)
EST. GFR  (AFRICAN AMERICAN): >60 ML/MIN/1.73 M^2
EST. GFR  (NON AFRICAN AMERICAN): >60 ML/MIN/1.73 M^2
GLUCOSE SERPL-MCNC: 108 MG/DL (ref 70–110)
HCT VFR BLD AUTO: 32.9 % (ref 40–54)
HGB BLD-MCNC: 11.2 G/DL (ref 14–18)
IMM GRANULOCYTES # BLD AUTO: 0.08 K/UL (ref 0–0.04)
IMM GRANULOCYTES NFR BLD AUTO: 0.7 % (ref 0–0.5)
INR PPP: 1 (ref 0.8–1.2)
LYMPHOCYTES # BLD AUTO: 1.9 K/UL (ref 1–4.8)
LYMPHOCYTES NFR BLD: 17.6 % (ref 18–48)
MAGNESIUM SERPL-MCNC: 1.9 MG/DL (ref 1.6–2.6)
MCH RBC QN AUTO: 32.7 PG (ref 27–31)
MCHC RBC AUTO-ENTMCNC: 34 G/DL (ref 32–36)
MCV RBC AUTO: 96 FL (ref 82–98)
MONOCYTES # BLD AUTO: 1.1 K/UL (ref 0.3–1)
MONOCYTES NFR BLD: 9.9 % (ref 4–15)
NEUTROPHILS # BLD AUTO: 7.6 K/UL (ref 1.8–7.7)
NEUTROPHILS NFR BLD: 70 % (ref 38–73)
NRBC BLD-RTO: 0 /100 WBC
PHOSPHATE SERPL-MCNC: 2.2 MG/DL (ref 2.7–4.5)
PLATELET # BLD AUTO: 115 K/UL (ref 150–450)
PMV BLD AUTO: 11.6 FL (ref 9.2–12.9)
POCT GLUCOSE: 116 MG/DL (ref 70–110)
POCT GLUCOSE: 118 MG/DL (ref 70–110)
POCT GLUCOSE: 124 MG/DL (ref 70–110)
POCT GLUCOSE: 126 MG/DL (ref 70–110)
POTASSIUM SERPL-SCNC: 3.3 MMOL/L (ref 3.5–5.1)
POTASSIUM SERPL-SCNC: 3.4 MMOL/L (ref 3.5–5.1)
PROT SERPL-MCNC: 5.7 G/DL (ref 6–8.4)
PROTHROMBIN TIME: 10.4 SEC (ref 9–12.5)
RBC # BLD AUTO: 3.43 M/UL (ref 4.6–6.2)
SODIUM SERPL-SCNC: 134 MMOL/L (ref 136–145)
WBC # BLD AUTO: 10.81 K/UL (ref 3.9–12.7)

## 2022-07-25 PROCEDURE — 80053 COMPREHEN METABOLIC PANEL: CPT | Performed by: STUDENT IN AN ORGANIZED HEALTH CARE EDUCATION/TRAINING PROGRAM

## 2022-07-25 PROCEDURE — 25000003 PHARM REV CODE 250

## 2022-07-25 PROCEDURE — 94640 AIRWAY INHALATION TREATMENT: CPT

## 2022-07-25 PROCEDURE — 25000242 PHARM REV CODE 250 ALT 637 W/ HCPCS

## 2022-07-25 PROCEDURE — 63600175 PHARM REV CODE 636 W HCPCS

## 2022-07-25 PROCEDURE — 85610 PROTHROMBIN TIME: CPT | Performed by: NURSE PRACTITIONER

## 2022-07-25 PROCEDURE — 99232 SBSQ HOSP IP/OBS MODERATE 35: CPT | Mod: ,,, | Performed by: ANESTHESIOLOGY

## 2022-07-25 PROCEDURE — 27000221 HC OXYGEN, UP TO 24 HOURS

## 2022-07-25 PROCEDURE — 84100 ASSAY OF PHOSPHORUS: CPT | Performed by: NURSE PRACTITIONER

## 2022-07-25 PROCEDURE — 94761 N-INVAS EAR/PLS OXIMETRY MLT: CPT

## 2022-07-25 PROCEDURE — 25000003 PHARM REV CODE 250: Performed by: STUDENT IN AN ORGANIZED HEALTH CARE EDUCATION/TRAINING PROGRAM

## 2022-07-25 PROCEDURE — 63600175 PHARM REV CODE 636 W HCPCS: Performed by: STUDENT IN AN ORGANIZED HEALTH CARE EDUCATION/TRAINING PROGRAM

## 2022-07-25 PROCEDURE — 97535 SELF CARE MNGMENT TRAINING: CPT

## 2022-07-25 PROCEDURE — 25000003 PHARM REV CODE 250: Performed by: NURSE PRACTITIONER

## 2022-07-25 PROCEDURE — 99900035 HC TECH TIME PER 15 MIN (STAT)

## 2022-07-25 PROCEDURE — 25000242 PHARM REV CODE 250 ALT 637 W/ HCPCS: Performed by: THORACIC SURGERY (CARDIOTHORACIC VASCULAR SURGERY)

## 2022-07-25 PROCEDURE — 97116 GAIT TRAINING THERAPY: CPT

## 2022-07-25 PROCEDURE — 84132 ASSAY OF SERUM POTASSIUM: CPT | Performed by: THORACIC SURGERY (CARDIOTHORACIC VASCULAR SURGERY)

## 2022-07-25 PROCEDURE — 27100171 HC OXYGEN HIGH FLOW UP TO 24 HOURS

## 2022-07-25 PROCEDURE — 99232 PR SUBSEQUENT HOSPITAL CARE,LEVL II: ICD-10-PCS | Mod: ,,, | Performed by: ANESTHESIOLOGY

## 2022-07-25 PROCEDURE — 83735 ASSAY OF MAGNESIUM: CPT | Performed by: NURSE PRACTITIONER

## 2022-07-25 PROCEDURE — 63600175 PHARM REV CODE 636 W HCPCS: Performed by: NURSE PRACTITIONER

## 2022-07-25 PROCEDURE — 20000000 HC ICU ROOM

## 2022-07-25 PROCEDURE — 85025 COMPLETE CBC W/AUTO DIFF WBC: CPT | Performed by: NURSE PRACTITIONER

## 2022-07-25 PROCEDURE — 97530 THERAPEUTIC ACTIVITIES: CPT

## 2022-07-25 RX ORDER — SODIUM CHLORIDE 9 MG/ML
INJECTION, SOLUTION INTRAVENOUS
Status: DISCONTINUED | OUTPATIENT
Start: 2022-07-25 | End: 2022-07-26

## 2022-07-25 RX ORDER — LEVALBUTEROL INHALATION SOLUTION 0.63 MG/3ML
0.63 SOLUTION RESPIRATORY (INHALATION)
Status: DISCONTINUED | OUTPATIENT
Start: 2022-07-25 | End: 2022-07-25

## 2022-07-25 RX ORDER — SODIUM,POTASSIUM PHOSPHATES 280-250MG
2 POWDER IN PACKET (EA) ORAL
Status: DISCONTINUED | OUTPATIENT
Start: 2022-07-25 | End: 2022-07-26

## 2022-07-25 RX ORDER — LEVALBUTEROL INHALATION SOLUTION 0.63 MG/3ML
0.63 SOLUTION RESPIRATORY (INHALATION) EVERY 6 HOURS PRN
Status: DISCONTINUED | OUTPATIENT
Start: 2022-07-25 | End: 2022-07-25

## 2022-07-25 RX ORDER — NIFEDIPINE 30 MG/1
30 TABLET, EXTENDED RELEASE ORAL DAILY
Status: DISCONTINUED | OUTPATIENT
Start: 2022-07-25 | End: 2022-07-26

## 2022-07-25 RX ORDER — AMIODARONE HYDROCHLORIDE 200 MG/1
200 TABLET ORAL 2 TIMES DAILY
Status: DISCONTINUED | OUTPATIENT
Start: 2022-07-25 | End: 2022-07-27 | Stop reason: HOSPADM

## 2022-07-25 RX ORDER — METOPROLOL TARTRATE 25 MG/1
25 TABLET, FILM COATED ORAL 2 TIMES DAILY
Status: DISCONTINUED | OUTPATIENT
Start: 2022-07-25 | End: 2022-07-27 | Stop reason: HOSPADM

## 2022-07-25 RX ORDER — HYDROMORPHONE HYDROCHLORIDE 1 MG/ML
1 INJECTION, SOLUTION INTRAMUSCULAR; INTRAVENOUS; SUBCUTANEOUS EVERY 4 HOURS PRN
Status: CANCELLED | OUTPATIENT
Start: 2022-07-25

## 2022-07-25 RX ORDER — LEVALBUTEROL INHALATION SOLUTION 0.63 MG/3ML
0.63 SOLUTION RESPIRATORY (INHALATION) EVERY 4 HOURS PRN
Status: DISCONTINUED | OUTPATIENT
Start: 2022-07-25 | End: 2022-07-25

## 2022-07-25 RX ORDER — ACETAMINOPHEN 500 MG
1000 TABLET ORAL EVERY 8 HOURS
Status: DISCONTINUED | OUTPATIENT
Start: 2022-07-25 | End: 2022-07-26

## 2022-07-25 RX ORDER — NIFEDIPINE 30 MG/1
60 TABLET, EXTENDED RELEASE ORAL DAILY
Status: DISCONTINUED | OUTPATIENT
Start: 2022-07-25 | End: 2022-07-25

## 2022-07-25 RX ORDER — LEVALBUTEROL INHALATION SOLUTION 0.63 MG/3ML
0.63 SOLUTION RESPIRATORY (INHALATION)
Status: DISCONTINUED | OUTPATIENT
Start: 2022-07-25 | End: 2022-07-27 | Stop reason: HOSPADM

## 2022-07-25 RX ORDER — FAMOTIDINE 20 MG/1
20 TABLET, FILM COATED ORAL 2 TIMES DAILY
Status: DISCONTINUED | OUTPATIENT
Start: 2022-07-25 | End: 2022-07-27 | Stop reason: HOSPADM

## 2022-07-25 RX ORDER — HYDROMORPHONE HYDROCHLORIDE 1 MG/ML
1 INJECTION, SOLUTION INTRAMUSCULAR; INTRAVENOUS; SUBCUTANEOUS
Status: DISCONTINUED | OUTPATIENT
Start: 2022-07-25 | End: 2022-07-27 | Stop reason: HOSPADM

## 2022-07-25 RX ADMIN — LEVALBUTEROL HYDROCHLORIDE 0.63 MG: 0.63 SOLUTION RESPIRATORY (INHALATION) at 04:07

## 2022-07-25 RX ADMIN — NIFEDIPINE 30 MG: 30 TABLET, FILM COATED, EXTENDED RELEASE ORAL at 08:07

## 2022-07-25 RX ADMIN — METHOCARBAMOL 500 MG: 500 TABLET ORAL at 09:07

## 2022-07-25 RX ADMIN — ASPIRIN 325 MG ORAL TABLET 325 MG: 325 PILL ORAL at 08:07

## 2022-07-25 RX ADMIN — ACETAMINOPHEN 1000 MG: 500 TABLET ORAL at 01:07

## 2022-07-25 RX ADMIN — POTASSIUM & SODIUM PHOSPHATES POWDER PACK 280-160-250 MG 2 PACKET: 280-160-250 PACK at 08:07

## 2022-07-25 RX ADMIN — FUROSEMIDE 40 MG: 10 INJECTION, SOLUTION INTRAMUSCULAR; INTRAVENOUS at 07:07

## 2022-07-25 RX ADMIN — MAGNESIUM SULFATE HEPTAHYDRATE 2 G: 40 INJECTION, SOLUTION INTRAVENOUS at 04:07

## 2022-07-25 RX ADMIN — DOCUSATE SODIUM 100 MG: 100 CAPSULE ORAL at 08:07

## 2022-07-25 RX ADMIN — OXYCODONE 5 MG: 5 TABLET ORAL at 01:07

## 2022-07-25 RX ADMIN — FUROSEMIDE 40 MG: 10 INJECTION, SOLUTION INTRAMUSCULAR; INTRAVENOUS at 08:07

## 2022-07-25 RX ADMIN — MUPIROCIN 1 G: 20 OINTMENT TOPICAL at 09:07

## 2022-07-25 RX ADMIN — METOPROLOL TARTRATE 25 MG: 25 TABLET, FILM COATED ORAL at 09:07

## 2022-07-25 RX ADMIN — OXYCODONE 5 MG: 5 TABLET ORAL at 07:07

## 2022-07-25 RX ADMIN — AMIODARONE HYDROCHLORIDE 0.5 MG/MIN: 1.8 INJECTION, SOLUTION INTRAVENOUS at 02:07

## 2022-07-25 RX ADMIN — METHOCARBAMOL 500 MG: 500 TABLET ORAL at 05:07

## 2022-07-25 RX ADMIN — HYDROMORPHONE HYDROCHLORIDE 1 MG: 1 INJECTION, SOLUTION INTRAMUSCULAR; INTRAVENOUS; SUBCUTANEOUS at 04:07

## 2022-07-25 RX ADMIN — ACETAMINOPHEN 1000 MG: 500 TABLET ORAL at 09:07

## 2022-07-25 RX ADMIN — POLYETHYLENE GLYCOL 3350 17 G: 17 POWDER, FOR SOLUTION ORAL at 08:07

## 2022-07-25 RX ADMIN — METOPROLOL TARTRATE 25 MG: 25 TABLET, FILM COATED ORAL at 08:07

## 2022-07-25 RX ADMIN — POTASSIUM & SODIUM PHOSPHATES POWDER PACK 280-160-250 MG 2 PACKET: 280-160-250 PACK at 07:07

## 2022-07-25 RX ADMIN — ACETAMINOPHEN 1000 MG: 500 TABLET ORAL at 05:07

## 2022-07-25 RX ADMIN — AMIODARONE HYDROCHLORIDE 0.5 MG/MIN: 1.8 INJECTION, SOLUTION INTRAVENOUS at 03:07

## 2022-07-25 RX ADMIN — MUPIROCIN 1 G: 20 OINTMENT TOPICAL at 08:07

## 2022-07-25 RX ADMIN — METHOCARBAMOL 500 MG: 500 TABLET ORAL at 01:07

## 2022-07-25 RX ADMIN — FAMOTIDINE 20 MG: 20 TABLET ORAL at 09:07

## 2022-07-25 RX ADMIN — OXYCODONE HYDROCHLORIDE 10 MG: 10 TABLET ORAL at 01:07

## 2022-07-25 RX ADMIN — FLUTICASONE FUROATE AND VILANTEROL TRIFENATATE 1 PUFF: 200; 25 POWDER RESPIRATORY (INHALATION) at 08:07

## 2022-07-25 RX ADMIN — HYDROMORPHONE HYDROCHLORIDE 1 MG: 1 INJECTION, SOLUTION INTRAMUSCULAR; INTRAVENOUS; SUBCUTANEOUS at 02:07

## 2022-07-25 RX ADMIN — POTASSIUM CHLORIDE 40 MEQ: 29.8 INJECTION, SOLUTION INTRAVENOUS at 05:07

## 2022-07-25 RX ADMIN — POTASSIUM CHLORIDE 40 MEQ: 29.8 INJECTION, SOLUTION INTRAVENOUS at 04:07

## 2022-07-25 RX ADMIN — LEVALBUTEROL HYDROCHLORIDE 0.63 MG: 0.63 SOLUTION RESPIRATORY (INHALATION) at 08:07

## 2022-07-25 RX ADMIN — METHOCARBAMOL 500 MG: 500 TABLET ORAL at 08:07

## 2022-07-25 RX ADMIN — LEVALBUTEROL HYDROCHLORIDE 0.63 MG: 0.63 SOLUTION RESPIRATORY (INHALATION) at 12:07

## 2022-07-25 RX ADMIN — FAMOTIDINE 20 MG: 20 TABLET ORAL at 08:07

## 2022-07-25 RX ADMIN — LEVALBUTEROL HYDROCHLORIDE 0.63 MG: 0.63 SOLUTION RESPIRATORY (INHALATION) at 06:07

## 2022-07-25 RX ADMIN — ATORVASTATIN CALCIUM 40 MG: 20 TABLET, FILM COATED ORAL at 08:07

## 2022-07-25 RX ADMIN — AMIODARONE HYDROCHLORIDE 200 MG: 200 TABLET ORAL at 06:07

## 2022-07-25 NOTE — ASSESSMENT & PLAN NOTE
Kong Celaya is a 52 y.o. male with medical problems including asthma/chronic bronchitis and severe mitral regurgitation now s/p MV repair and PFO closure on 7/22/22.      Neuro/Psych:   -- Pain: Oxy plus dilaudid prn  -- Scheduled Tylenol; robaxin             Cards:   -- S/P MVR, PFO closure on 7/22/22  -- HDS off vasopressor and inotropic support  -- Ventricular pacing wires.  -- MAP 60-80, Syst < 130  -- Cleviprex PRN; Nifedipine 30 QD  --  mg QD, BB, statin  -- Afib with RVR --> now on amio gtt     Pulm:   -- Goal O2 sat > 90%  -- Extubated on 7L by NC, now on 5L   -- ABG PRN  -- Duo-nebs Q4, Breo QD  -- Mediastinal chest tubes x2 to suction, remove today      Renal:  -- D/c alvarado today  -- Trend BUN/Cr   -- Lasix 40 BID today      FEN / GI:   -- Replace lytes as needed  -- Nutrition: Cardiac diet, 1500 mL fluid restriction  -- GI ppx: famotidine  -- Bowel reg: miralax      ID:   -- Tm: afebrile; WBC stable  -- Kacy-op ancef      Heme/Onc:   -- H/H stable   -- Daily CBC  -- 1150 ml Cell saver given back  -- Aspirin 325mg QD      Endo:   -- BG goal 140-180  -- SSI      PPx:   Feeding: Cardiac diet, 1500 cc fluid restriction   Analgesia/Sedation: none / PRN Oxy + Dilaudid  Thromboembolic prevention: SCDs  HOB >30: Yes  Stress Ulcer ppx: famotidine  Glucose control: Critical care goal 140-180 g/dl, ISS     Lines/Drains/Airway: CVC RIJ, Alvarado, Chest tubes x 2, ventricular pacing wires, L ulnar A-line      Dispo/Code Status/Palliative:   -- SICU / Full Code.

## 2022-07-25 NOTE — NURSING
"      SICU PLAN OF CARE NOTE    Dx: Moderate to severe mitral regurgitation    Shift Events: Patient went into Afib with RVR overnight. Amio bolus and gtt started.    Goals of Care: MAPs 60-80, SBP <130, PT/OT    Neuro: AAO x4, Follows Commands and Moves All Extremities    Vital Signs: /73 (BP Location: Right arm, Patient Position: Sitting)   Pulse 98   Temp 98 °F (36.7 °C) (Oral)   Resp 16   Ht 5' 10" (1.778 m)   Wt 134.7 kg (297 lb)   SpO2 96%   BMI 42.62 kg/m²     Respiratory: Nasal Cannula 3L    Diet: Cardiac Diet 1500cc FR    Gtts: Amiodarone and Cleviprex gtt infusing    Urine Output: Voids Spontaneously 1600 cc/shift    Drains: Chest Tube, total output 20 cc /  shift     Labs/Accuchecks: Acccuchecks ACHS    Skin: Skin CDI. Midsternal incision CDI, PRIYANKA. Patient able to reposition self independently although providing weight shift assistance as needed. OOBTC this AM.       "

## 2022-07-25 NOTE — PT/OT/SLP PROGRESS
Occupational Therapy   Treatment    Name: Kong Celaya  MRN: [id number]  Admitting Diagnosis:  Moderate to severe mitral regurgitation  3 Days Post-Op    Recommendations:     Discharge Recommendations: home  Discharge Equipment Recommendations:  none  Barriers to discharge:  None    Assessment:     Kong Celaya is a 52 y.o. male with a medical diagnosis of Moderate to severe mitral regurgitation.  He presents with c/o of 6/10 pain at sternum, RN notified and arrived with pain medicaiton. Pt tolerated session well demonstrating improvement in activity tolerance and is progressing towards OT goals. Performance deficits affecting function are weakness, impaired endurance, impaired self care skills, impaired functional mobility, gait instability, impaired balance, pain, impaired sensation, decreased upper extremity function, impaired cardiopulmonary response to activity. Pt would benefit from skilled OT services in order to maximize independence with ADLs and facilitate safe discharge. Anticipating pt will be able to return home with no additional OT needs when medically stable.      Rehab Prognosis:  Good; patient would benefit from acute skilled OT services to address these deficits and reach maximum level of function.       Plan:     Patient to be seen 5 x/week to address the above listed problems via self-care/home management, therapeutic activities, therapeutic exercises, neuromuscular re-education  · Plan of Care Expires: 08/22/22  · Plan of Care Reviewed with: patient, spouse    Subjective     Pain/Comfort:  · Pain Rating 1: 6/10  · Location 1: sternal  · Pain Addressed 1: Pre-medicate for activity, Reposition, Distraction  · Pain Rating Post-Intervention 1: 6/10    Objective:     Communicated with: RN prior to session.  Patient found up in chair with pulse ox (continuous), telemetry, arterial line, oxygen, central line upon OT entry to room. Pt's spouse at bedside.    General Precautions:  Standard, fall, sternal   Orthopedic Precautions:N/A   Braces: N/A  Respiratory Status: Nasal cannula, flow 2 L/min     Occupational Performance:     Bed Mobility:    · Patient completed Sit to Supine with minimum assistance LE management    Functional Mobility/Transfers:  · Patient completed Sit <> Stand Transfer with stand by assistance  with  no assistive device   · Patient completed Toilet Transfer Step Transfer technique with stand by assistance with  no AD  · Functional Mobility: Pt ambulated 12 ft x2 (bed<>bathroom) with SBA and no AD in order to maximize functional activity tolerance and standing balance required for engagement in occupations of choice.      Activities of Daily Living:  · Grooming: stand by assistance to perform hand hygiene standing at sink  · Toileting: maximal assistance posterior pericare from toilet      AMPA 6 Click ADL: 18    Treatment & Education:  -Therapist provided facilitation and instruction of proper body mechanics, energy conservation, and fall prevention strategies during tasks listed above.  -Pt educated on role of OT, POC and goals for therapy  -Pt with good adherence to sternal precautions during session  -Pt educated on importance of OOB activities with staff member assistance and sitting OOB majority of the day.   -Pt verbalized understanding. Pt expressed no further concerns/questions  -Whiteboard updated    Patient left HOB elevated with all lines intact, call button in reach, RN notified and spouse presentEducation:      GOALS:   Multidisciplinary Problems     Occupational Therapy Goals        Problem: Occupational Therapy    Goal Priority Disciplines Outcome Interventions   Occupational Therapy Goal     OT, PT/OT Ongoing, Progressing    Description: Goals to be met by: 8/6/22     Patient will increase functional independence with ADLs by performing:    Feeding with [place].  UE Dressing with [place].  LE Dressing with [place].  Grooming while standing  at sink with [place].  Toileting from toilet with [place] for hygiene and clothing management.   Toilet transfer to toilet with [place].  Pt will engage in functional mobility to simulate household distances in order to maximize functional activity tolerance required for engagement in occupations of choice with supervision using LRAD.                       Time Tracking:     OT Date of Treatment: 07/25/22  OT Start Time: 1324  OT Stop Time: 1354  OT Total Time (min): 30 min    Billable Minutes:Self Care/Home Management 30    OT/PRIYANKA: OT          7/25/2022

## 2022-07-25 NOTE — CARE UPDATE
Care Update:     No acute events overnight. Patient on the SICU in room 83505 CVICU/64521 CVICU A. Blood glucose stable. BG at and below goal on current insulin regimen (SSI ). No exogenous insulin required. Steroid use- None. 3 Days Post-Op  Renal function- Normal   Vasopressors-  None       Diet Cardiac Fluid - 1500mL     POCT Glucose   Date Value Ref Range Status   07/25/2022 124 (H) 70 - 110 mg/dL Final   07/24/2022 227 (H) 70 - 110 mg/dL Final   07/24/2022 103 70 - 110 mg/dL Final   07/24/2022 101 70 - 110 mg/dL Final   07/24/2022 100 70 - 110 mg/dL Final   07/23/2022 125 (H) 70 - 110 mg/dL Final   07/23/2022 113 (H) 70 - 110 mg/dL Final   07/23/2022 96 70 - 110 mg/dL Final   07/23/2022 149 (H) 70 - 110 mg/dL Final   07/23/2022 124 (H) 70 - 110 mg/dL Final   07/23/2022 124 (H) 70 - 110 mg/dL Final   07/23/2022 122 (H) 70 - 110 mg/dL Final   07/23/2022 130 (H) 70 - 110 mg/dL Final   07/23/2022 116 (H) 70 - 110 mg/dL Final   07/23/2022 99 70 - 110 mg/dL Final   07/23/2022 117 (H) 70 - 110 mg/dL Final   07/23/2022 115 (H) 70 - 110 mg/dL Final   07/23/2022 144 (H) 70 - 110 mg/dL Final   07/22/2022 144 (H) 70 - 110 mg/dL Final   07/22/2022 150 (H) 70 - 110 mg/dL Final   07/22/2022 150 (H) 70 - 110 mg/dL Final   07/22/2022 141 (H) 70 - 110 mg/dL Final   07/22/2022 157 (H) 70 - 110 mg/dL Final   07/22/2022 229 (H) 70 - 110 mg/dL Final   07/22/2022 169 (H) 70 - 110 mg/dL Final   07/22/2022 175 (H) 70 - 110 mg/dL Final   07/22/2022 175 (H) 70 - 110 mg/dL Final   07/22/2022 178 (H) 70 - 110 mg/dL Final     Lab Results   Component Value Date    HGBA1C 5.4 07/21/2022       Endocrinology consulted for BG management.   BG goal 110-140 [facility]    Hospital Medications   BG checks AC/HS            glucagon (human recombinant) injection 1 mg 1 mg, Intramuscular, As needed (PRN), Turn patient on their side, give IM, and NOTIFY MD IMMEDIATELY.<BR><BR>Feed the patient as soon as patient awakens and is able to swallow.    glucose  chewable tablet 16 g 16 g, Oral, As needed (PRN), (16 grams = #  four 4gm glucose tablets)    glucose chewable tablet 24 g 24 g, Oral, As needed (PRN), (24 grams = # six 4gm glucose tablets)    insulin aspart U-100 pen 0-5 Units 0-5 Units, Subcutaneous, Before meals &amp; nightly PRN, **LOW CORRECTION DOSE**<BR>Blood Glucose<BR>mg/dL                  Pre-meal                2200<BR>151-200                0 unit                      0 unit<BR>201-250                2 units                    1 unit<BR>251-300                3 units                    1 unit<BR>301-350                4 units                    2 units<BR>&gt;350                     5 units                    3 units<BR>Administer subcutaneously if needed at times designated by monitoring schedule. <BR>DO NOT HOLD correction dose insulin for patients who are  NPO.<BR>&quot;HIGH ALERT MEDICATION&quot; - Administer with meals or TF/TPN.          *Will likely sign off tomorrow.     ** Please notify Endocrine for any change and/or advance in diet**  ** Please call Endocrine for any BG related issues **    Discharge Planning:   TBD. Please notify endocrinology prior to discharge.      Harish Landrum DNP, FNP-C  Department of Endocrinology  Inpatient Glycemic Management

## 2022-07-25 NOTE — PROGRESS NOTES
[facility]  Critical Care - Surgery  Progress Note    Patient Name: Kong Celaya  MRN: [id number]  Admission Date: 7/22/2022  Hospital Length of Stay: 3 days  Code Status: Full Code  Attending Provider: Jet Gastelum MD  Primary Care Provider: Dax Thomas MD   Principal Problem: Moderate to severe mitral regurgitation    Subjective:     Hospital/ICU Course:  No notes on file    No new subjective & objective note has been filed under this hospital service since the last note was generated.    Assessment/Plan:     * Moderate to severe mitral regurgitation  Kong Celaya is a 52 y.o. male with medical problems including asthma/chronic bronchitis and severe mitral regurgitation now s/p MV repair and PFO closure on 7/22/22.      Neuro/Psych:   -- Pain: Oxy plus dilaudid prn  -- Scheduled Tylenol; robaxin             Cards:   -- S/P MVR, PFO closure on 7/22/22  -- HDS off vasopressor and inotropic support  -- Ventricular pacing wires.  -- MAP 60-80, Syst < 130  -- Cleviprex PRN; Nifedipine 30 QD  --  mg QD, BB, statin     Pulm:   -- Goal O2 sat > 90%  -- Extubated on 7L by NC, only increasing requirement while sleeping   -- ABG PRN  -- Duo-nebs Q4, Breo QD  -- Mediastinal chest tubes x2 to suction, remove today      Renal:  -- D/c alvarado today  -- Trend BUN/Cr   -- Lasix 40 BID today      FEN / GI:   -- Replace lytes as needed  -- Nutrition: CLD, ADAT  -- GI ppx: famotidine  -- Bowel reg: miralax      ID:   -- Tm: afebrile; WBC stable  -- Kacy-op ancef      Heme/Onc:   -- H/H stable   -- Daily CBC  -- 1150 ml Cell saver given back  -- Aspirin 325mg QD      Endo:   -- BG goal 140-180  -- SSI      PPx:   Feeding: Cardiac diet, 1500 cc fluid restriction   Analgesia/Sedation: none / PRN Oxy + Dilaudid  Thromboembolic prevention: SCDs  HOB >30: Yes  Stress Ulcer ppx: famotidine  Glucose control: Critical care goal 140-180 g/dl, ISS     Lines/Drains/Airway: CVC  Sushant MALDONADO, Chest tubes x 2, ventricular pacing wires, L ulnar A-line      Dispo/Code Status/Palliative:   -- SICU / Full Code.            Harriet Woodard MD  Critical Care - Surgery  [facility] - [facility]

## 2022-07-25 NOTE — ASSESSMENT & PLAN NOTE
Kong Celaya is a 52 y.o. male with medical problems including asthma/chronic bronchitis and severe mitral regurgitation now s/p MV repair and PFO closure on 7/22/22.      Neuro/Psych:   -- Pain: Oxy plus dilaudid prn  -- Scheduled Tylenol; robaxin             Cards:   -- S/P MVR, PFO closure on 7/22/22  -- HDS off vasopressor and inotropic support  -- Ventricular pacing wires.  -- MAP 60-80, Syst < 130  -- Cleviprex PRN; Nifedipine 30 QD  --  mg QD, BB, statin     Pulm:   -- Goal O2 sat > 90%  -- Extubated on 7L by NC, only increasing requirement while sleeping   -- ABG PRN  -- Duo-nebs Q4, Breo QD  -- Mediastinal chest tubes x2 to suction, remove today      Renal:  -- D/c alvarado today  -- Trend BUN/Cr   -- Lasix 40 BID today      FEN / GI:   -- Replace lytes as needed  -- Nutrition: CLD, ADAT  -- GI ppx: famotidine  -- Bowel reg: miralax      ID:   -- Tm: afebrile; WBC stable  -- Kacy-op ancef      Heme/Onc:   -- H/H stable   -- Daily CBC  -- 1150 ml Cell saver given back  -- Aspirin 325mg QD      Endo:   -- BG goal 140-180  -- SSI      PPx:   Feeding: Cardiac diet, 1500 cc fluid restriction   Analgesia/Sedation: none / PRN Oxy + Dilaudid  Thromboembolic prevention: SCDs  HOB >30: Yes  Stress Ulcer ppx: famotidine  Glucose control: Critical care goal 140-180 g/dl, ISS     Lines/Drains/Airway: CVC RIJEAN-CLAUDE, Alvarado, Chest tubes x 2, ventricular pacing wires, L ulnar A-line      Dispo/Code Status/Palliative:   -- SICU / Full Code.

## 2022-07-25 NOTE — PT/OT/SLP PROGRESS
"Physical Therapy Treatment    Patient Name:  Kong Celaya   MRN:  [id number]    Recommendations:     Discharge Recommendations:  home health PT   Discharge Equipment Recommendations: none   Barriers to discharge: Inaccessible home    Assessment:     Kong Celaya is a 52 y.o. male admitted with a medical diagnosis of Moderate to severe mitral regurgitation. Patient tolerated session well. Demonstrates improved activity tolerance as demonstrated by increased gait distance.     He presents with the following impairments/functional limitations: weakness, impaired balance, impaired endurance, impaired self care skills, impaired functional mobility, gait instability, pain, impaired cardiopulmonary response to activity, impaired sensation, decreased lower extremity function, decreased upper extremity function. Once medically stable, recommending pt discharge to home health PT.    Rehab Prognosis: Good; patient continues to benefit from acute skilled PT services to address these deficits and reach maximum level of function.  Recent Surgery: Procedure(s) (LRB):  Valvuloplasty, Mitral (N/A) 3 Days Post-Op    Plan:     During this hospitalization, patient to be seen 5 x/week to address the identified rehab impairments via gait training, therapeutic activities, therapeutic exercises, neuromuscular re-education and progress toward the following goals:    · Plan of Care Expires:  08/21/22    Subjective     Chief Complaint: Chest pain  Patient/Family Comments/Goals: "Come back tomorrow and we can do it again"  Pain/Comfort:  · Pain Rating 1:  (7-8/10)  · Location - Orientation 1: midline  · Location 1: sternal  · Pain Addressed 1: Distraction, Cessation of Activity  · Pain Rating Post-Intervention 1:  (3-4/10)    Objective:     Communicated with RN prior to session. Patient found HOB elevated with pulse ox (continuous), telemetry, arterial line, oxygen, central line upon PT entry to room.     General Precautions: " Standard, fall, sternal   Orthopedic Precautions:N/A   Braces: N/A    Functional Mobility:  · Bed Mobility:     · Rolling Left:  contact guard assistance  · Supine to Sit: minimum assistance  · Sit to Supine: contact guard assistance  · Transfers:     · Sit to Stand: stand by assistance with no AD with cues for hand placement  · Gait: Patient ambulated 60 ft with no AD and stand by - contact guard assistance. Patient demonstrates occasional unsteady gait and decreased step length. Cuing for safety. Oxygen removed for gait trial with stable oxygen saturation noted. Pt reported feeling dizzy at end of gait trial, VSS, returned to seated with improvement in symptoms noted.  · Balance:   · Static Sitting: Good, able to maintain for 5 minute(s) with supervision  · Dynamic Sitting: not assessed this visit  · Static Standing: Good, able to maintain for 20 seconds with stand by assistance  · Dynamic Standing: Fair: Patient accepts minimal challenge, contact guard assistance    AM-PAC 6 CLICK MOBILITY  Turning over in bed (including adjusting bedclothes, sheets and blankets)?: 3  Sitting down on and standing up from a chair with arms (e.g., wheelchair, bedside commode, etc.): 3  Moving from lying on back to sitting on the side of the bed?: 3  Moving to and from a bed to a chair (including a wheelchair)?: 3  Need to walk in hospital room?: 3  Climbing 3-5 steps with a railing?: 2  Basic Mobility Total Score: 17     Therapeutic Activities and Exercises:  Patient educated on role of acute care PT and PT POC, safety while in hospital including calling nurse for mobility and call light usage  Patient educated on Post-op sternal precautions, including no lifting > 5 lbs, pulling or pushing with BUEs. Patient able to voice 2/3 precautions without assistance.  Whiteboard updated, Patient is clear to ambulate short distances with RN/PCT, assist x1  Educated about importance of utilizing incentive spirometer regularly  Educated about  importance of OOB mobility and remaining up in chair most of the day  Educated about PLB technique and cued for use with mobility    Patient left HOB elevated with all lines intact, call button in reach and RN notified.    GOALS:   Multidisciplinary Problems     Physical Therapy Goals        Problem: Physical Therapy    Goal Priority Disciplines Outcome Goal Variances Interventions   Physical Therapy Goal     PT, PT/OT Ongoing, Progressing     Description: Goals to be met by: 2022     Patient will increase functional independence with mobility by performin. Supine to sit with Supervision  2. Sit to stand transfer with Supervision  3. Gait  x 400 feet with Stand-by Assistance without AD.  4. Ascend/descend 4 stairs with L handrail with CGA.  5. Lower extremity exercise program x15 reps, with supervision, in order to increase LE strength and (I) with functional mobility.                       Time Tracking:     PT Received On: 22  PT Start Time: 1409     PT Stop Time: 1435  PT Total Time (min): 26 min     Billable Minutes: Gait Training 10 min and Therapeutic Activity 16 min     Treatment Type: Treatment  PT/PTA: PT     PTA Visit Number: 0     2022

## 2022-07-25 NOTE — PROGRESS NOTES
[facility]  Critical Care - Surgery  Progress Note    Patient Name: Kong Celaya  MRN: [id number]  Admission Date: 7/22/2022  Hospital Length of Stay: 3 days  Code Status: Full Code  Attending Provider: Jet Gastelum MD  Primary Care Provider: Dax Thomas MD   Principal Problem: Moderate to severe mitral regurgitation    Subjective:     Hospital/ICU Course:  No notes on file    Interval History/Significant Events: Went into afib with RVR, started on amio. HDS off all vasopressor support. Off cleviprex overnight.    Follow-up For: Procedure(s) (LRB):  Valvuloplasty, Mitral (N/A)    Post-Operative Day: 3 Days Post-Op    Objective:     Vital Signs (Most Recent):  Temp: 98 °F (36.7 °C) (07/24/22 2300)  Pulse: 94 (07/25/22 0645)  Resp: 19 (07/25/22 0645)  BP: 125/73 (07/25/22 0600)  SpO2: 96 % (07/25/22 0645) Vital Signs (24h Range):  Temp:  [98 °F (36.7 °C)-98.7 °F (37.1 °C)] 98 °F (36.7 °C)  Pulse:  [] 94  Resp:  [15-34] 19  SpO2:  [88 %-98 %] 96 %  BP: (113-153)/() 125/73  Arterial Line BP: ()/() 102/58     Weight: 134.7 kg (297 lb)  Body mass index is 42.62 kg/m².      Intake/Output Summary (Last 24 hours) at 7/25/2022 0700  Last data filed at 7/25/2022 0600  Gross per 24 hour   Intake 519.95 ml   Output 3690 ml   Net -3170.05 ml       Physical Exam  Vitals reviewed.   Constitutional:       General: He is not in acute distress.     Appearance: He is not ill-appearing.   HENT:      Head: Normocephalic and atraumatic.   Neck:      Comments: Right IJ CVC  Cardiovascular:      Rate and Rhythm: Normal rate. Rhythm irregular.      Comments: V-wires  Meds x2  Midline sternotomy incision  Pulmonary:      Effort: Pulmonary effort is normal. No respiratory distress.      Comments: On 5L by NC  Abdominal:      General: There is no distension.      Palpations: Abdomen is soft.   Genitourinary:     Comments: [place]   Musculoskeletal:      Comments: L ulnar art line    Neurological:      General: No focal deficit present.      Mental Status: He is alert and oriented to person, place, and time.       Vents:  Vent Mode: Spont (07/22/22 1745)  Ventilator Initiated: Yes (07/22/22 1403)  Set Rate: 26 BPM (07/22/22 1715)  Vt Set: 500 mL (07/22/22 1715)  Pressure Support: 8 cmH20 (07/22/22 1745)  PEEP/CPAP: 10 cmH20 (07/22/22 1745)  Oxygen Concentration (%): 32 (07/25/22 0602)  Peak Airway Pressure: 19 cmH2O (07/22/22 1745)  Plateau Pressure: 20 cmH20 (07/22/22 1745)  Total Ve: 15.6 mL (07/22/22 1745)  Negative Inspiratory Force (cm H2O): -30 (07/22/22 1745)  F/VT Ratio<105 (RSBI): (!) 47.04 (07/22/22 1745)    Lines/Drains/Airways       Central Venous Catheter Line  Duration              Introducer with Double Lumen 07/22/22 1038 right internal jugular 2 days    Percutaneous Central Line Insertion/Assessment - Triple Lumen  07/22/22 1038 right internal jugular 2 days              Drain  Duration                  Y Chest Tube 1 and 2 07/22/22 1301 1 Right Mediastinal 19 Fr. 2 Left Mediastinal 19 Fr. 2 days              Arterial Line  Duration             Arterial Line 07/22/22 1031 Left Other (Comment) 2 days              Line  Duration                  Pacer Wires 07/22/22 1300 2 days              Peripheral Intravenous Line  Duration                  Peripheral IV - Single Lumen 07/22/22 0618 18 G Left;Posterior Hand 3 days                    Significant Labs:    CBC/Anemia Profile:  Recent Labs   Lab 07/23/22  0839 07/24/22  0307 07/25/22  0247   WBC  --  14.52* 10.81   HGB  --  11.6* 11.2*   HCT 37 34.1* 32.9*   PLT  --  103* 115*   MCV  --  96 96   RDW  --  12.2 12.1        Chemistries:  Recent Labs   Lab 07/24/22  0307 07/24/22  1620 07/25/22  0247   *  --  134*   K 3.7 3.9 3.4*     --  96   CO2 25  --  29   BUN 15  --  17   CREATININE 0.7  --  0.7   CALCIUM 8.1*  --  8.1*   ALBUMIN 3.0*  --  2.8*   PROT 5.7*  --  5.7*   BILITOT 1.0  --  0.8   ALKPHOS 59  --  111   ALT  43  --  84*   AST 54*  --  119*   MG 1.8  --  1.9   PHOS 1.9*  --  2.2*       ABGs:   Recent Labs   Lab 07/24/22  0438   PH 7.465*   PCO2 39.7   HCO3 28.6*   POCSATURATED 94*   BE 5     Coagulation:   Recent Labs   Lab 07/25/22  0247   INR 1.0     Lactic Acid: No results for input(s): LACTATE in the last 48 hours.  All pertinent labs within the past 24 hours have been reviewed.    Significant Imaging:  I have reviewed all pertinent imaging results/findings within the past 24 hours.    Assessment/Plan:     * Moderate to severe mitral regurgitation  Kong Celaya is a 52 y.o. male with medical problems including asthma/chronic bronchitis and severe mitral regurgitation now s/p MV repair and PFO closure on 7/22/22.      Neuro/Psych:   -- Pain: Oxy plus dilaudid prn  -- Scheduled Tylenol; robaxin             Cards:   -- S/P MVR, PFO closure on 7/22/22  -- HDS off vasopressor and inotropic support  -- Ventricular pacing wires.  -- MAP 60-80, Syst < 130  -- Cleviprex PRN; Nifedipine 30 QD  --  mg QD, BB, statin  -- Afib with RVR --> now on amio gtt     Pulm:   -- Goal O2 sat > 90%  -- Extubated on 7L by NC, now on 5L   -- ABG PRN  -- Duo-nebs Q4, Breo QD  -- Mediastinal chest tubes x2 to suction, remove today      Renal:  -- D/c alvarado today  -- Trend BUN/Cr   -- Lasix 40 BID today      FEN / GI:   -- Replace lytes as needed  -- Nutrition: Cardiac diet, 1500 mL fluid restriction  -- GI ppx: famotidine  -- Bowel reg: miralax      ID:   -- Tm: afebrile; WBC stable  -- Kacy-op ancef      Heme/Onc:   -- H/H stable   -- Daily CBC  -- 1150 ml Cell saver given back  -- Aspirin 325mg QD      Endo:   -- BG goal 140-180  -- SSI      PPx:   Feeding: Cardiac diet, 1500 cc fluid restriction   Analgesia/Sedation: none / PRN Oxy + Dilaudid  Thromboembolic prevention: SCDs  HOB >30: Yes  Stress Ulcer ppx: famotidine  Glucose control: Critical care goal 140-180 g/dl, ISS     Lines/Drains/Airway: CVC Sushant MALDONADO,  Chest tubes x 2, ventricular pacing wires, L ulnar A-line      Dispo/Code Status/Palliative:   -- SICU / Full Code.            Harriet Woodard MD  Critical Care - Surgery  [facility]

## 2022-07-25 NOTE — SUBJECTIVE & OBJECTIVE
Interval History/Significant Events: Went into a-fib with RVR     Follow-up For: Procedure(s) (LRB):  Valvuloplasty, Mitral (N/A)    Post-Operative Day: 3 Days Post-Op    Objective:     Vital Signs (Most Recent):  Temp: 98 °F (36.7 °C) (07/24/22 2300)  Pulse: 94 (07/25/22 0645)  Resp: 19 (07/25/22 0645)  BP: 125/73 (07/25/22 0600)  SpO2: 96 % (07/25/22 0645) Vital Signs (24h Range):  Temp:  [98 °F (36.7 °C)-98.7 °F (37.1 °C)] 98 °F (36.7 °C)  Pulse:  [] 94  Resp:  [15-34] 19  SpO2:  [88 %-98 %] 96 %  BP: (113-153)/() 125/73  Arterial Line BP: ()/() 102/58     Weight: 134.7 kg (297 lb)  Body mass index is 42.62 kg/m².      Intake/Output Summary (Last 24 hours) at 7/25/2022 0700  Last data filed at 7/25/2022 0600  Gross per 24 hour   Intake 519.95 ml   Output 3690 ml   Net -3170.05 ml       Physical Exam  Vitals reviewed.   Constitutional:       General: He is not in acute distress.     Appearance: He is not ill-appearing.   HENT:      Head: Normocephalic and atraumatic.   Neck:      Comments: Right IJ CVC  Cardiovascular:      Rate and Rhythm: Normal rate. Rhythm irregular.      Comments: V-wires  Meds x2  Midline sternotomy incision  Pulmonary:      Effort: Pulmonary effort is normal. No respiratory distress.      Comments: On 5L by NC  Abdominal:      General: There is no distension.      Palpations: Abdomen is soft.   Genitourinary:     Comments: [place]   Musculoskeletal:      Comments: L ulnar art line   Neurological:      General: No focal deficit present.      Mental Status: He is alert and oriented to person, place, and time.       Vents:  Vent Mode: Spont (07/22/22 1745)  Ventilator Initiated: Yes (07/22/22 1403)  Set Rate: 26 BPM (07/22/22 1715)  Vt Set: 500 mL (07/22/22 1715)  Pressure Support: 8 cmH20 (07/22/22 1745)  PEEP/CPAP: 10 cmH20 (07/22/22 1745)  Oxygen Concentration (%): 32 (07/25/22 0602)  Peak Airway Pressure: 19 cmH2O (07/22/22 1745)  Plateau Pressure: 20 cmH20 (07/22/22  1745)  Total Ve: 15.6 mL (07/22/22 1745)  Negative Inspiratory Force (cm H2O): -30 (07/22/22 1745)  F/VT Ratio<105 (RSBI): (!) 47.04 (07/22/22 1745)    Lines/Drains/Airways       Central Venous Catheter Line  Duration              Introducer with Double Lumen 07/22/22 1038 right internal jugular 2 days    Percutaneous Central Line Insertion/Assessment - Triple Lumen  07/22/22 1038 right internal jugular 2 days              Drain  Duration                  Y Chest Tube 1 and 2 07/22/22 1301 1 Right Mediastinal 19 Fr. 2 Left Mediastinal 19 Fr. 2 days              Arterial Line  Duration             Arterial Line 07/22/22 1031 Left Other (Comment) 2 days              Line  Duration                  Pacer Wires 07/22/22 1300 2 days              Peripheral Intravenous Line  Duration                  Peripheral IV - Single Lumen 07/22/22 0618 18 G Left;Posterior Hand 3 days                    Significant Labs:    CBC/Anemia Profile:  Recent Labs   Lab 07/23/22  0839 07/24/22  0307 07/25/22  0247   WBC  --  14.52* 10.81   HGB  --  11.6* 11.2*   HCT 37 34.1* 32.9*   PLT  --  103* 115*   MCV  --  96 96   RDW  --  12.2 12.1        Chemistries:  Recent Labs   Lab 07/24/22  0307 07/24/22  1620 07/25/22  0247   *  --  134*   K 3.7 3.9 3.4*     --  96   CO2 25  --  29   BUN 15  --  17   CREATININE 0.7  --  0.7   CALCIUM 8.1*  --  8.1*   ALBUMIN 3.0*  --  2.8*   PROT 5.7*  --  5.7*   BILITOT 1.0  --  0.8   ALKPHOS 59  --  111   ALT 43  --  84*   AST 54*  --  119*   MG 1.8  --  1.9   PHOS 1.9*  --  2.2*       ABGs:   Recent Labs   Lab 07/24/22  0438   PH 7.465*   PCO2 39.7   HCO3 28.6*   POCSATURATED 94*   BE 5     Coagulation:   Recent Labs   Lab 07/25/22  0247   INR 1.0     Lactic Acid: No results for input(s): LACTATE in the last 48 hours.  All pertinent labs within the past 24 hours have been reviewed.    Significant Imaging:  I have reviewed all pertinent imaging results/findings within the past 24 hours.

## 2022-07-25 NOTE — PLAN OF CARE
CM  met with the patient to discuss discharge planning at this time they are living in a [employer] trailer he is not on dialysis and does not take coumadin he has transportation home   [facility]  [address] [address] [phone]  PCP DAX THOMAS  POA/SPOUSE ADINA   [facility]  Initial Discharge Assessment       Primary Care Provider: Dax Thomas MD    Admission Diagnosis: Moderate to severe mitral regurgitation [I34.0]  Mitral valve regurgitation [I34.0]    Admission Date: 7/22/2022  Expected Discharge Date:     Discharge Barriers Identified: None    Payor: CIG[employer] / Plan: C[employer] OPEN ACCESS PLUS / Product Type: Commercial /     Extended Emergency Contact Information  Primary Emergency Contact: Adina Delcid  Address: [address] [address] [place] [address], [place] [address] [facility]  Home Phone: [phone]  Relation: Spouse    Discharge Plan A: Home, Home with family  Discharge Plan B: Home with family, Home Health, Home      [facility] - [place], [place] - [address] [facility]  [address] [facility] [address]  Phone: [phone] Fax: [phone]    [facility], [place] - [address]  [address]  [place] [address]  Phone: [phone] Fax: [phone]      Initial Assessment (most recent)     Adult Discharge Assessment - 07/25/22 1131        Discharge Assessment    Assessment Type Discharge Planning Assessment     Confirmed/corrected address, phone number and insurance Yes     Confirmed Demographics Correct on Facesheet     Source of Information patient;family     Communicated BARB with patient/caregiver Date not available/Unable to determine     Lives With spouse     Do you expect to return to your current living situation? Yes     Do you have help at home or someone to help you manage your care at home? Yes     Prior to hospitilization cognitive status: No Deficits     Current cognitive status: No Deficits     Walking or Climbing  Stairs Difficulty none     Dressing/Bathing Difficulty none     Home Layout --   LIVES IN A FEMA TRAILER    Equipment Currently Used at Home none     Readmission within 30 days? No     Patient currently being followed by outpatient case management? No     Do you currently have service(s) that help you manage your care at home? No     Do you take prescription medications? Yes     Do you have prescription coverage? Yes     Do you have any problems affording any of your prescribed medications? No     Is the patient taking medications as prescribed? yes     Who is going to help you get home at discharge? SPOUSE JONATHON      How do you get to doctors appointments? family or friend will provide     Are you on dialysis? No     Do you take coumadin? No     Discharge Plan A Home;Home with family     Discharge Plan B Home with family;Home Health;Home     DME Needed Upon Discharge  none     Discharge Plan discussed with: Spouse/sig other;Patient     Discharge Barriers Identified None

## 2022-07-26 LAB
ABO + RH BLD: NORMAL
ALBUMIN SERPL BCP-MCNC: 2.7 G/DL (ref 3.5–5.2)
ALP SERPL-CCNC: 181 U/L (ref 55–135)
ALT SERPL W/O P-5'-P-CCNC: 138 U/L (ref 10–44)
ANION GAP SERPL CALC-SCNC: 9 MMOL/L (ref 8–16)
AST SERPL-CCNC: 146 U/L (ref 10–40)
BASOPHILS # BLD AUTO: 0.06 K/UL (ref 0–0.2)
BASOPHILS NFR BLD: 0.6 % (ref 0–1.9)
BILIRUB SERPL-MCNC: 1 MG/DL (ref 0.1–1)
BLD GP AB SCN CELLS X3 SERPL QL: NORMAL
BUN SERPL-MCNC: 16 MG/DL (ref 6–20)
CALCIUM SERPL-MCNC: 8.4 MG/DL (ref 8.7–10.5)
CHLORIDE SERPL-SCNC: 98 MMOL/L (ref 95–110)
CO2 SERPL-SCNC: 28 MMOL/L (ref 23–29)
CREAT SERPL-MCNC: 0.7 MG/DL (ref 0.5–1.4)
DIFFERENTIAL METHOD: ABNORMAL
DLCO ADJ PRE: 24.68 ML/(MIN*MMHG) (ref 23.16–37.01)
DLCO SINGLE BREATH LLN: 23.16
DLCO SINGLE BREATH PRE REF: 84.2 %
DLCO SINGLE BREATH REF: 30.09
DLCOC SBVA LLN: 3.11
DLCOC SBVA PRE REF: 95.9 %
DLCOC SBVA REF: 4.35
DLCOC SINGLE BREATH LLN: 23.16
DLCOC SINGLE BREATH PRE REF: 82 %
DLCOC SINGLE BREATH REF: 30.09
DLCOCSBVAULN: 5.58
DLCOCSINGLEBREATHULN: 37.01
DLCOSINGLEBREATHULN: 37.01
DLCOVA LLN: 3.11
DLCOVA PRE REF: 98.5 %
DLCOVA PRE: 4.28 ML/(MIN*MMHG*L) (ref 3.11–5.58)
DLCOVA REF: 4.35
DLCOVAULN: 5.58
DLVAADJ PRE: 4.17 ML/(MIN*MMHG*L) (ref 3.11–5.58)
EOSINOPHIL # BLD AUTO: 0.3 K/UL (ref 0–0.5)
EOSINOPHIL NFR BLD: 2.9 % (ref 0–8)
ERYTHROCYTE [DISTWIDTH] IN BLOOD BY AUTOMATED COUNT: 12.3 % (ref 11.5–14.5)
EST. GFR  (AFRICAN AMERICAN): >60 ML/MIN/1.73 M^2
EST. GFR  (NON AFRICAN AMERICAN): >60 ML/MIN/1.73 M^2
FEF 25 75 LLN: 1.84
FEF 25 75 PRE REF: 43.6 %
FEF 25 75 REF: 3.43
FEV05 LLN: 1.74
FEV05 REF: 2.87
FEV1 FVC LLN: 68
FEV1 FVC PRE REF: 92.3 %
FEV1 FVC REF: 79
FEV1 LLN: 2.93
FEV1 PRE REF: 58.1 %
FEV1 REF: 3.77
FVC LLN: 3.73
FVC PRE REF: 62.8 %
FVC REF: 4.78
GLUCOSE SERPL-MCNC: 100 MG/DL (ref 70–110)
HCT VFR BLD AUTO: 31.8 % (ref 40–54)
HGB BLD-MCNC: 10.8 G/DL (ref 14–18)
IMM GRANULOCYTES # BLD AUTO: 0.06 K/UL (ref 0–0.04)
IMM GRANULOCYTES NFR BLD AUTO: 0.6 % (ref 0–0.5)
INR PPP: 1 (ref 0.8–1.2)
IVC PRE: 3.08 L (ref 3.73–5.85)
IVC SINGLE BREATH LLN: 3.73
IVC SINGLE BREATH PRE REF: 64.4 %
IVC SINGLE BREATH REF: 4.78
IVCSINGLEBREATHULN: 5.85
LYMPHOCYTES # BLD AUTO: 1.8 K/UL (ref 1–4.8)
LYMPHOCYTES NFR BLD: 18.7 % (ref 18–48)
MAGNESIUM SERPL-MCNC: 1.8 MG/DL (ref 1.6–2.6)
MCH RBC QN AUTO: 32.7 PG (ref 27–31)
MCHC RBC AUTO-ENTMCNC: 34 G/DL (ref 32–36)
MCV RBC AUTO: 96 FL (ref 82–98)
MONOCYTES # BLD AUTO: 1 K/UL (ref 0.3–1)
MONOCYTES NFR BLD: 10.4 % (ref 4–15)
MVV LLN: 121
MVV PRE REF: 56.5 %
MVV REF: 143
NEUTROPHILS # BLD AUTO: 6.5 K/UL (ref 1.8–7.7)
NEUTROPHILS NFR BLD: 66.8 % (ref 38–73)
NRBC BLD-RTO: 0 /100 WBC
PEF LLN: 7.3
PEF PRE REF: 75.5 %
PEF REF: 9.55
PHOSPHATE SERPL-MCNC: 2.9 MG/DL (ref 2.7–4.5)
PHYSICIAN COMMENT: ABNORMAL
PLATELET # BLD AUTO: 135 K/UL (ref 150–450)
PMV BLD AUTO: 11.1 FL (ref 9.2–12.9)
POCT GLUCOSE: 101 MG/DL (ref 70–110)
POCT GLUCOSE: 108 MG/DL (ref 70–110)
POTASSIUM SERPL-SCNC: 3.4 MMOL/L (ref 3.5–5.1)
PRE DLCO: 25.35 ML/(MIN*MMHG) (ref 23.16–37.01)
PRE FEF 25 75: 1.5 L/S (ref 1.84–5.52)
PRE FET 100: 6.07 SEC
PRE FEV05 REF: 62.3 %
PRE FEV1 FVC: 72.99 % (ref 68.04–88.57)
PRE FEV1: 2.19 L (ref 2.93–4.57)
PRE FEV5: 1.79 L (ref 1.74–4.01)
PRE FVC: 3 L (ref 3.73–5.85)
PRE MVV: 80.67 L/MIN (ref 121.28–164.09)
PRE PEF: 7.22 L/S (ref 7.3–11.81)
PROT SERPL-MCNC: 5.8 G/DL (ref 6–8.4)
PROTHROMBIN TIME: 10.3 SEC (ref 9–12.5)
RBC # BLD AUTO: 3.3 M/UL (ref 4.6–6.2)
SODIUM SERPL-SCNC: 135 MMOL/L (ref 136–145)
VA PRE: 5.92 L (ref 6.77–6.77)
VA SINGLE BREATH LLN: 6.77
VA SINGLE BREATH PRE REF: 87.4 %
VA SINGLE BREATH REF: 6.77
VASINGLEBREATHULN: 6.77
WBC # BLD AUTO: 9.66 K/UL (ref 3.9–12.7)

## 2022-07-26 PROCEDURE — 80053 COMPREHEN METABOLIC PANEL: CPT | Performed by: STUDENT IN AN ORGANIZED HEALTH CARE EDUCATION/TRAINING PROGRAM

## 2022-07-26 PROCEDURE — 25000003 PHARM REV CODE 250

## 2022-07-26 PROCEDURE — 86901 BLOOD TYPING SEROLOGIC RH(D): CPT | Performed by: STUDENT IN AN ORGANIZED HEALTH CARE EDUCATION/TRAINING PROGRAM

## 2022-07-26 PROCEDURE — 25000242 PHARM REV CODE 250 ALT 637 W/ HCPCS: Performed by: THORACIC SURGERY (CARDIOTHORACIC VASCULAR SURGERY)

## 2022-07-26 PROCEDURE — 27000221 HC OXYGEN, UP TO 24 HOURS

## 2022-07-26 PROCEDURE — 20600001 HC STEP DOWN PRIVATE ROOM

## 2022-07-26 PROCEDURE — 83735 ASSAY OF MAGNESIUM: CPT | Performed by: NURSE PRACTITIONER

## 2022-07-26 PROCEDURE — 25000003 PHARM REV CODE 250: Performed by: NURSE PRACTITIONER

## 2022-07-26 PROCEDURE — 63600175 PHARM REV CODE 636 W HCPCS

## 2022-07-26 PROCEDURE — 97116 GAIT TRAINING THERAPY: CPT

## 2022-07-26 PROCEDURE — 99232 PR SUBSEQUENT HOSPITAL CARE,LEVL II: ICD-10-PCS | Mod: ,,, | Performed by: ANESTHESIOLOGY

## 2022-07-26 PROCEDURE — 63600175 PHARM REV CODE 636 W HCPCS: Performed by: NURSE PRACTITIONER

## 2022-07-26 PROCEDURE — 63600175 PHARM REV CODE 636 W HCPCS: Performed by: STUDENT IN AN ORGANIZED HEALTH CARE EDUCATION/TRAINING PROGRAM

## 2022-07-26 PROCEDURE — 85025 COMPLETE CBC W/AUTO DIFF WBC: CPT | Performed by: NURSE PRACTITIONER

## 2022-07-26 PROCEDURE — 99232 SBSQ HOSP IP/OBS MODERATE 35: CPT | Mod: ,,, | Performed by: ANESTHESIOLOGY

## 2022-07-26 PROCEDURE — 97530 THERAPEUTIC ACTIVITIES: CPT

## 2022-07-26 PROCEDURE — 94761 N-INVAS EAR/PLS OXIMETRY MLT: CPT

## 2022-07-26 PROCEDURE — 85610 PROTHROMBIN TIME: CPT | Performed by: NURSE PRACTITIONER

## 2022-07-26 PROCEDURE — 84100 ASSAY OF PHOSPHORUS: CPT | Performed by: NURSE PRACTITIONER

## 2022-07-26 PROCEDURE — 94640 AIRWAY INHALATION TREATMENT: CPT

## 2022-07-26 PROCEDURE — 94799 UNLISTED PULMONARY SVC/PX: CPT

## 2022-07-26 PROCEDURE — 99900035 HC TECH TIME PER 15 MIN (STAT)

## 2022-07-26 PROCEDURE — 25000003 PHARM REV CODE 250: Performed by: STUDENT IN AN ORGANIZED HEALTH CARE EDUCATION/TRAINING PROGRAM

## 2022-07-26 RX ORDER — POTASSIUM CHLORIDE 20 MEQ/1
40 TABLET, EXTENDED RELEASE ORAL 2 TIMES DAILY
Status: DISCONTINUED | OUTPATIENT
Start: 2022-07-26 | End: 2022-07-27 | Stop reason: HOSPADM

## 2022-07-26 RX ORDER — NIFEDIPINE 30 MG/1
30 TABLET, EXTENDED RELEASE ORAL DAILY
Status: DISCONTINUED | OUTPATIENT
Start: 2022-07-26 | End: 2022-07-27 | Stop reason: HOSPADM

## 2022-07-26 RX ORDER — NIFEDIPINE 30 MG/1
30 TABLET, EXTENDED RELEASE ORAL 2 TIMES DAILY
Status: DISCONTINUED | OUTPATIENT
Start: 2022-07-26 | End: 2022-07-26

## 2022-07-26 RX ORDER — ACETAMINOPHEN 325 MG/1
650 TABLET ORAL EVERY 8 HOURS
Status: DISCONTINUED | OUTPATIENT
Start: 2022-07-26 | End: 2022-07-27 | Stop reason: HOSPADM

## 2022-07-26 RX ORDER — POTASSIUM CHLORIDE 20 MEQ/1
20 TABLET, EXTENDED RELEASE ORAL 2 TIMES DAILY
Status: DISCONTINUED | OUTPATIENT
Start: 2022-07-26 | End: 2022-07-26

## 2022-07-26 RX ORDER — NIFEDIPINE 30 MG/1
60 TABLET, EXTENDED RELEASE ORAL DAILY
Status: DISCONTINUED | OUTPATIENT
Start: 2022-07-26 | End: 2022-07-26

## 2022-07-26 RX ADMIN — LEVALBUTEROL HYDROCHLORIDE 0.63 MG: 0.63 SOLUTION RESPIRATORY (INHALATION) at 07:07

## 2022-07-26 RX ADMIN — METHOCARBAMOL 500 MG: 500 TABLET ORAL at 04:07

## 2022-07-26 RX ADMIN — MAGNESIUM SULFATE HEPTAHYDRATE 2 G: 40 INJECTION, SOLUTION INTRAVENOUS at 04:07

## 2022-07-26 RX ADMIN — LEVALBUTEROL HYDROCHLORIDE 0.63 MG: 0.63 SOLUTION RESPIRATORY (INHALATION) at 11:07

## 2022-07-26 RX ADMIN — LEVALBUTEROL HYDROCHLORIDE 0.63 MG: 0.63 SOLUTION RESPIRATORY (INHALATION) at 04:07

## 2022-07-26 RX ADMIN — ACETAMINOPHEN 1000 MG: 500 TABLET ORAL at 06:07

## 2022-07-26 RX ADMIN — METOPROLOL TARTRATE 25 MG: 25 TABLET, FILM COATED ORAL at 08:07

## 2022-07-26 RX ADMIN — FAMOTIDINE 20 MG: 20 TABLET ORAL at 08:07

## 2022-07-26 RX ADMIN — ATORVASTATIN CALCIUM 40 MG: 20 TABLET, FILM COATED ORAL at 08:07

## 2022-07-26 RX ADMIN — LEVALBUTEROL HYDROCHLORIDE 0.63 MG: 0.63 SOLUTION RESPIRATORY (INHALATION) at 09:07

## 2022-07-26 RX ADMIN — OXYCODONE HYDROCHLORIDE 10 MG: 10 TABLET ORAL at 03:07

## 2022-07-26 RX ADMIN — METHOCARBAMOL 500 MG: 500 TABLET ORAL at 12:07

## 2022-07-26 RX ADMIN — POLYETHYLENE GLYCOL 3350 17 G: 17 POWDER, FOR SOLUTION ORAL at 08:07

## 2022-07-26 RX ADMIN — ACETAMINOPHEN 650 MG: 325 TABLET ORAL at 01:07

## 2022-07-26 RX ADMIN — POTASSIUM CHLORIDE 20 MEQ: 1500 TABLET, EXTENDED RELEASE ORAL at 08:07

## 2022-07-26 RX ADMIN — NIFEDIPINE 30 MG: 30 TABLET, FILM COATED, EXTENDED RELEASE ORAL at 08:07

## 2022-07-26 RX ADMIN — FUROSEMIDE 40 MG: 10 INJECTION, SOLUTION INTRAMUSCULAR; INTRAVENOUS at 07:07

## 2022-07-26 RX ADMIN — POTASSIUM CHLORIDE 40 MEQ: 1500 TABLET, EXTENDED RELEASE ORAL at 08:07

## 2022-07-26 RX ADMIN — DOCUSATE SODIUM 100 MG: 100 CAPSULE ORAL at 08:07

## 2022-07-26 RX ADMIN — OXYCODONE 5 MG: 5 TABLET ORAL at 09:07

## 2022-07-26 RX ADMIN — AMIODARONE HYDROCHLORIDE 200 MG: 200 TABLET ORAL at 08:07

## 2022-07-26 RX ADMIN — SODIUM CHLORIDE: 0.9 INJECTION, SOLUTION INTRAVENOUS at 04:07

## 2022-07-26 RX ADMIN — OXYCODONE HYDROCHLORIDE 10 MG: 10 TABLET ORAL at 02:07

## 2022-07-26 RX ADMIN — ASPIRIN 325 MG ORAL TABLET 325 MG: 325 PILL ORAL at 08:07

## 2022-07-26 RX ADMIN — FLUTICASONE FUROATE AND VILANTEROL TRIFENATATE 1 PUFF: 200; 25 POWDER RESPIRATORY (INHALATION) at 07:07

## 2022-07-26 RX ADMIN — FUROSEMIDE 40 MG: 10 INJECTION, SOLUTION INTRAMUSCULAR; INTRAVENOUS at 08:07

## 2022-07-26 RX ADMIN — POTASSIUM CHLORIDE 40 MEQ: 29.8 INJECTION, SOLUTION INTRAVENOUS at 04:07

## 2022-07-26 RX ADMIN — ACETAMINOPHEN 650 MG: 325 TABLET ORAL at 10:07

## 2022-07-26 RX ADMIN — METHOCARBAMOL 500 MG: 500 TABLET ORAL at 08:07

## 2022-07-26 RX ADMIN — HYDROMORPHONE HYDROCHLORIDE 1 MG: 1 INJECTION, SOLUTION INTRAMUSCULAR; INTRAVENOUS; SUBCUTANEOUS at 01:07

## 2022-07-26 RX ADMIN — HYDROMORPHONE HYDROCHLORIDE 1 MG: 1 INJECTION, SOLUTION INTRAMUSCULAR; INTRAVENOUS; SUBCUTANEOUS at 06:07

## 2022-07-26 RX ADMIN — MUPIROCIN 1 G: 20 OINTMENT TOPICAL at 08:07

## 2022-07-26 NOTE — SUBJECTIVE & OBJECTIVE
Interval History/Significant Events: No acute events overnight. Hemodynamically stable off inotropic support, however was hypertensive overnight. Pain is well controlled. Tolerating diet.    Follow-up For: Procedure(s) (LRB):  Valvuloplasty, Mitral (N/A)    Post-Operative Day: 4 Days Post-Op    Objective:     Vital Signs (Most Recent):  Temp: 98.4 °F (36.9 °C) (07/26/22 0300)  Pulse: 69 (07/26/22 0400)  Resp: 20 (07/26/22 0400)  BP: (!) 144/70 (07/26/22 0400)  SpO2: 98 % (07/26/22 0400)   Vital Signs (24h Range):  Temp:  [98 °F (36.7 °C)-98.6 °F (37 °C)] 98.4 °F (36.9 °C)  Pulse:  [] 69  Resp:  [15-34] 20  SpO2:  [93 %-100 %] 98 %  BP: (105-162)/(56-85) 144/70  Arterial Line BP: ()/(51-92) 153/90     Weight: 134.7 kg (297 lb)  Body mass index is 42.62 kg/m².      Intake/Output Summary (Last 24 hours) at 7/26/2022 0550  Last data filed at 7/26/2022 0100  Gross per 24 hour   Intake 2056.79 ml   Output 3575 ml   Net -1518.21 ml       Physical Exam  Vitals reviewed.   Constitutional:       General: He is not in acute distress.     Appearance: He is not ill-appearing.   HENT:      Head: Normocephalic and atraumatic.   Eyes:      Pupils: Pupils are equal, round, and reactive to light.   Neck:      Comments: Right IJ CVC  Cardiovascular:      Rate and Rhythm: Normal rate. Rhythm irregular.      Comments: Midline sternotomy incision c/d with dressing in place  Pulmonary:      Effort: Pulmonary effort is normal. No respiratory distress.      Comments: On 1L by NC  Abdominal:      General: There is no distension.      Palpations: Abdomen is soft.   Skin:     General: Skin is warm and dry.      Capillary Refill: Capillary refill takes less than 2 seconds.   Neurological:      General: No focal deficit present.      Mental Status: He is alert and oriented to person, place, and time.       Vents:  Vent Mode: Spont (07/22/22 5395)  Ventilator Initiated: Yes (07/22/22 1403)  Set Rate: 26 BPM (07/22/22 6895)  Vt Set:  500 mL (07/22/22 1715)  Pressure Support: 8 cmH20 (07/22/22 1745)  PEEP/CPAP: 10 cmH20 (07/22/22 1745)  Oxygen Concentration (%): 32 (07/25/22 0602)  Peak Airway Pressure: 19 cmH2O (07/22/22 1745)  Plateau Pressure: 20 cmH20 (07/22/22 1745)  Total Ve: 15.6 mL (07/22/22 1745)  Negative Inspiratory Force (cm H2O): -30 (07/22/22 1745)  F/VT Ratio<105 (RSBI): (!) 47.04 (07/22/22 1745)    Lines/Drains/Airways       Central Venous Catheter Line  Duration              Introducer with Double Lumen 07/22/22 1038 right internal jugular 3 days    Percutaneous Central Line Insertion/Assessment - Triple Lumen  07/22/22 1038 right internal jugular 3 days              Peripheral Intravenous Line  Duration                  Peripheral IV - Single Lumen 07/22/22 0618 18 G Left;Posterior Hand 3 days         Peripheral IV - Single Lumen 07/25/22 1800 20 G Posterior;Right Hand <1 day                    Significant Labs:    CBC/Anemia Profile:  Recent Labs   Lab 07/25/22  0247 07/26/22  0309   WBC 10.81 9.66   HGB 11.2* 10.8*   HCT 32.9* 31.8*   * 135*   MCV 96 96   RDW 12.1 12.3        Chemistries:  Recent Labs   Lab 07/25/22  0247 07/25/22  1524 07/26/22  0309   *  --  135*   K 3.4* 3.3* 3.4*   CL 96  --  98   CO2 29  --  28   BUN 17  --  16   CREATININE 0.7  --  0.7   CALCIUM 8.1*  --  8.4*   ALBUMIN 2.8*  --  2.7*   PROT 5.7*  --  5.8*   BILITOT 0.8  --  1.0   ALKPHOS 111  --  181*   ALT 84*  --  138*   *  --  146*   MG 1.9  --  1.8   PHOS 2.2*  --  2.9       All pertinent labs within the past 24 hours have been reviewed.    Significant Imaging:  I have reviewed all pertinent imaging results/findings within the past 24 hours.

## 2022-07-26 NOTE — PT/OT/SLP PROGRESS
Physical Therapy Treatment/Discharge    Patient Name:  Kong Celaya   MRN:  [id number]    Recommendations:     Discharge Recommendations:  home   Discharge Equipment Recommendations: none   Barriers to discharge: None    Assessment:     Kong Celaya is a 52 y.o. male admitted with a medical diagnosis of Moderate to severe mitral regurgitation.  He presents with the following impairments/functional limitations:  pain Pt tolerated treatment well by gait training in the hallway. Pt is being discharged from acute PT services. Pt is safe to ambulate with family and nursing staff. At discharge, pt may return home with no needs.    Rehab Prognosis: Good   Recent Surgery: Procedure(s) (LRB):  Valvuloplasty, Mitral (N/A) 4 Days Post-Op    Plan:     During this hospitalization, patient to be seen  (pt is being discharged from acute PT) to address the identified rehab impairments via  (pt is being discharged from acute PT) and progress toward the following goals:    · Plan of Care Expires:  08/21/22    Subjective     Chief Complaint: nerve pain in R leg  Patient/Family Comments/goals: to return home  Pain/Comfort:  · Pain Rating 1: 8/10  · Location - Side 1: Right  · Location 1: leg  · Pain Addressed 1: Distraction, Reposition  · Pain Rating Post-Intervention 1: 8/10      Objective:     Communicated with Rn prior to session.  Patient found ambulatory in room with telemetry upon PT entry to room.     General Precautions: Standard, fall, sternal   Orthopedic Precautions:N/A   Braces:    Respiratory Status: Room air     Functional Mobility:  · Gait: pt gait trained 190' in the hallway included 9 stairs with a mask on. Pt took a standing rest break at the top of the 9 steps. Pt continued to gait train ~100' back to his room. Pt was SBA for all mobility      AM-PAC 6 CLICK MOBILITY  Turning over in bed (including adjusting bedclothes, sheets and blankets)?: 4  Sitting down on and standing up from a chair with arms  (e.g., wheelchair, bedside commode, etc.): 4  Moving from lying on back to sitting on the side of the bed?: 4  Moving to and from a bed to a chair (including a wheelchair)?: 4  Need to walk in hospital room?: 4  Climbing 3-5 steps with a railing?: 4  Basic Mobility Total Score: 24       Therapeutic Activities and Exercises:   Pt educated pt on POC. Pt verbally expressed understanding of such. PT reminded pt of his sternal precautions.     Patient left up in chair with all lines intact and call button in reach.    GOALS:   Multidisciplinary Problems     Physical Therapy Goals        Problem: Physical Therapy    Goal Priority Disciplines Outcome Goal Variances Interventions   Physical Therapy Goal     PT, PT/OT Ongoing, Progressing     Description: Goals to be met by: 2022     Patient will increase functional independence with mobility by performin. Supine to sit with Supervision   2. Sit to stand transfer with Supervision  3. Gait  x 400 feet with Stand-by Assistance without AD.  4. Ascend/descend 4 stairs with L handrail with CGA. -met 22  5. Lower extremity exercise program x15 reps, with supervision, in order to increase LE strength and (I) with functional mobility.                       Time Tracking:     PT Received On: 22  PT Start Time: 1304     PT Stop Time: 1315  PT Total Time (min): 11 min     Billable Minutes: Gait Training 11 minutes    Treatment Type: Treatment  PT/PTA: PT     PTA Visit Number: 0     2022

## 2022-07-26 NOTE — NURSING TRANSFER
Nursing Transfer Note      7/26/2022     Reason patient is being transferred: Stepdown     Transfer To: 302    Transfer via wheelchair    Transfer with cardiac monitoring    Transported by RN    Medicines sent: insulin pen    Any special needs or follow-up needed: N/A    Chart send with patient: Yes    Notified: spouse at bedside     Patient reassessed at: 7/26/2022, 1235     Upon arrival to floor: patient oriented to room and call bell in reach, pt in chair, report given to EDISON Allen. Nurse at bedside upon pt handoff. Questions and concerns addressed.

## 2022-07-26 NOTE — PLAN OF CARE
Problem: Physical Therapy  Goal: Physical Therapy Goal  Description: Goals to be met by: 2022     Patient will increase functional independence with mobility by performin. Supine to sit with Supervision   2. Sit to stand transfer with Supervision  3. Gait  x 400 feet with Stand-by Assistance without AD.  4. Ascend/descend 4 stairs with L handrail with CGA. -met 22  5. Lower extremity exercise program x15 reps, with supervision, in order to increase LE strength and (I) with functional mobility.      Outcome: Ongoing, Progressing   Pt is being discharged from acute PT services. 2022

## 2022-07-26 NOTE — PT/OT/SLP PROGRESS
Occupational Therapy   Treatment    Name: Kong Celaya  MRN: [id number]  Admitting Diagnosis:  Moderate to severe mitral regurgitation  4 Days Post-Op    Recommendations:     Discharge Recommendations: home  Discharge Equipment Recommendations:  none  Barriers to discharge:  None    Assessment:     Kong Celaya is a 52 y.o. male with a medical diagnosis of Moderate to severe mitral regurgitation.  He presents with good motivation and participation. Pt tolerated session well, displaying improvement in activity tolerance. Pt is progressing towards OT goals and would benefit from another OT session to further assess toileting. Performance deficits affecting function are weakness, impaired endurance, impaired self care skills, impaired functional mobility, impaired balance, gait instability, impaired cardiopulmonary response to activity, decreased lower extremity function, decreased upper extremity function, impaired sensation. Pt would benefit from skilled OT services in order to maximize independence with ADLs and facilitate safe discharge. Anticipating pt will be able to return home with no additional OT needs when medically stable.      Rehab Prognosis:  Good; patient would benefit from acute skilled OT services to address these deficits and reach maximum level of function.       Plan:     Patient to be seen 5 x/week to address the above listed problems via self-care/home management, therapeutic activities, therapeutic exercises, neuromuscular re-education  · Plan of Care Expires: 08/22/22  · Plan of Care Reviewed with: patient    Subjective     Pain/Comfort:  · Pain Rating 1: 0/10  · Pain Rating Post-Intervention 1: 0/10    Objective:     Communicated with: RN prior to session.  Patient found up in chair with pulse ox (continuous), telemetry, blood pressure cuff upon OT entry to room.    General Precautions: Standard, fall, sternal   Orthopedic Precautions:N/A   Braces: N/A  Respiratory Status:  Room air     Occupational Performance:     Bed Mobility:    · Did not observe; pt found and returned to bedside chair    Functional Mobility/Transfers:  · Patient completed Sit <> Stand Transfer with supervision  with  no assistive device   · Functional Mobility: Pt ambulated ~200 ft with SBA and no AD in order to maximize functional activity tolerance and standing balance required for engagement in occupations of choice.    · No LOB, SOB, or c/o of dizziness  · Portable monitor in tow and RN present    Activities of Daily Living:  · Feeding:  independence to self feed with utensils seated in bedisde chair  · Grooming: set-up A to perform oral care; pt performed facial hygiene with mod I with set-up A  · Upper Body Dressing: independence to don posterior gown      [facility] 6 Click ADL: 20    Treatment & Education:  -Therapist provided facilitation and instruction of proper body mechanics, energy conservation, and fall prevention strategies during tasks listed above.  -Pt educated on role of OT, POC and goals for therapy  -Pt educated on importance of OOB activities with staff member assistance and sitting OOB majority of the day.   -Pt verbalized understanding. Pt expressed no further concerns/questions    Patient left up in chair with all lines intact, call button in reach and RN notifiedEducation:      GOALS:   Multidisciplinary Problems     Occupational Therapy Goals        Problem: Occupational Therapy    Goal Priority Disciplines Outcome Interventions   Occupational Therapy Goal     OT, PT/OT Ongoing, Progressing    Description: Goals to be met by: 8/6/22     Patient will increase functional independence with ADLs by performing:    Feeding with [place].  UE Dressing with [place].  LE Dressing with [place].  Grooming while standing at sink with [place].  Toileting from toilet with [place] for hygiene and clothing management.   Toilet transfer to toilet with [place].  Pt will engage in  functional mobility to simulate household distances in order to maximize functional activity tolerance required for engagement in occupations of choice with supervision using LRAD.                       Time Tracking:     OT Date of Treatment: 07/26/22  OT Start Time: 0849  OT Stop Time: 0907  OT Total Time (min): 18 min    Billable Minutes:Therapeutic Activity 18    OT/PRIYANKA: OT          7/26/2022

## 2022-07-26 NOTE — NURSING
"      SICU PLAN OF CARE NOTE    Dx: Moderate to severe mitral regurgitation    Shift Events: NAEON    Goals of Care: Step down    Neuro: AAO x4, Follows Commands and Moves All Extremities    Vital Signs: /68 (BP Location: Right arm, Patient Position: Lying)   Pulse 71   Temp 98.4 °F (36.9 °C) (Oral)   Resp (!) 25   Ht 5' 10" (1.778 m)   Wt 134.7 kg (297 lb)   SpO2 97%   BMI 42.62 kg/m²     Respiratory: Room Air    Diet: Cardiac Diet, 1500cc FR    Urine Output: Voids Spontaneously 2025 cc/shift    Labs/Accuchecks: Accuchecks ACHS    Skin: Skin CDI. Midsternal incision PRIYANKA, CDI. Foams in place. Waffle mattress inflated. OOBTC this AM. Pt able to reposition self independently although providing weight shift assistance as needed.       "

## 2022-07-26 NOTE — PROGRESS NOTES
[facility] - [facility]ensive [facility]  Critical Care - Surgery  Progress Note    Patient Name: Kong Celaya  MRN: [id number]  Admission Date: 7/22/2022  Hospital Length of Stay: 4 days  Code Status: Full Code  Attending Provider: Jet Gastelum MD  Primary Care Provider: Dax Thomas MD   Principal Problem: Moderate to severe mitral regurgitation    Subjective:     Hospital/ICU Course:  No notes on file    Interval History/Significant Events: No acute events overnight. Hemodynamically stable off inotropic support, however was hypertensive overnight. Pain is well controlled. Tolerating diet.    Follow-up For: Procedure(s) (LRB):  Valvuloplasty, Mitral (N/A)    Post-Operative Day: 4 Days Post-Op    Objective:     Vital Signs (Most Recent):  Temp: 98.4 °F (36.9 °C) (07/26/22 0300)  Pulse: 69 (07/26/22 0400)  Resp: 20 (07/26/22 0400)  BP: (!) 144/70 (07/26/22 0400)  SpO2: 98 % (07/26/22 0400)   Vital Signs (24h Range):  Temp:  [98 °F (36.7 °C)-98.6 °F (37 °C)] 98.4 °F (36.9 °C)  Pulse:  [] 69  Resp:  [15-34] 20  SpO2:  [93 %-100 %] 98 %  BP: (105-162)/(56-85) 144/70  Arterial Line BP: ()/(51-92) 153/90     Weight: 134.7 kg (297 lb)  Body mass index is 42.62 kg/m².      Intake/Output Summary (Last 24 hours) at 7/26/2022 0550  Last data filed at 7/26/2022 0100  Gross per 24 hour   Intake 2056.79 ml   Output 3575 ml   Net -1518.21 ml       Physical Exam  Vitals reviewed.   Constitutional:       General: He is not in acute distress.     Appearance: He is not ill-appearing.   HENT:      Head: Normocephalic and atraumatic.   Eyes:      Pupils: Pupils are equal, round, and reactive to light.   Neck:      Comments: Right IJ CVC  Cardiovascular:      Rate and Rhythm: Normal rate. Rhythm irregular.      Comments: Midline sternotomy incision c/d with dressing in place  Pulmonary:      Effort: Pulmonary effort is normal. No respiratory distress.      Comments: On 1L by NC  Abdominal:      General: There is  no distension.      Palpations: Abdomen is soft.   Skin:     General: Skin is warm and dry.      Capillary Refill: Capillary refill takes less than 2 seconds.   Neurological:      General: No focal deficit present.      Mental Status: He is alert and oriented to person, place, and time.       Vents:  Vent Mode: Spont (07/22/22 1745)  Ventilator Initiated: Yes (07/22/22 1403)  Set Rate: 26 BPM (07/22/22 1715)  Vt Set: 500 mL (07/22/22 1715)  Pressure Support: 8 cmH20 (07/22/22 1745)  PEEP/CPAP: 10 cmH20 (07/22/22 1745)  Oxygen Concentration (%): 32 (07/25/22 0602)  Peak Airway Pressure: 19 cmH2O (07/22/22 1745)  Plateau Pressure: 20 cmH20 (07/22/22 1745)  Total Ve: 15.6 mL (07/22/22 1745)  Negative Inspiratory Force (cm H2O): -30 (07/22/22 1745)  F/VT Ratio<105 (RSBI): (!) 47.04 (07/22/22 1745)    Lines/Drains/Airways       Central Venous Catheter Line  Duration              Introducer with Double Lumen 07/22/22 1038 right internal jugular 3 days    Percutaneous Central Line Insertion/Assessment - Triple Lumen  07/22/22 1038 right internal jugular 3 days              Peripheral Intravenous Line  Duration                  Peripheral IV - Single Lumen 07/22/22 0618 18 G Left;Posterior Hand 3 days         Peripheral IV - Single Lumen 07/25/22 1800 20 G Posterior;Right Hand <1 day                    Significant Labs:    CBC/Anemia Profile:  Recent Labs   Lab 07/25/22  0247 07/26/22  0309   WBC 10.81 9.66   HGB 11.2* 10.8*   HCT 32.9* 31.8*   * 135*   MCV 96 96   RDW 12.1 12.3        Chemistries:  Recent Labs   Lab 07/25/22  0247 07/25/22  1524 07/26/22  0309   *  --  135*   K 3.4* 3.3* 3.4*   CL 96  --  98   CO2 29  --  28   BUN 17  --  16   CREATININE 0.7  --  0.7   CALCIUM 8.1*  --  8.4*   ALBUMIN 2.8*  --  2.7*   PROT 5.7*  --  5.8*   BILITOT 0.8  --  1.0   ALKPHOS 111  --  181*   ALT 84*  --  138*   *  --  146*   MG 1.9  --  1.8   PHOS 2.2*  --  2.9       All pertinent labs within the past 24  hours have been reviewed.    Significant Imaging:  I have reviewed all pertinent imaging results/findings within the past 24 hours.    Assessment/Plan:     * Moderate to severe mitral regurgitation  Kong Celaya is a 52 y.o. male with medical problems including asthma/chronic bronchitis and severe mitral regurgitation now s/p MV repair and PFO closure on 7/22/22.      Neuro/Psych:   -- Pain: Oxy plus dilaudid prn  -- Scheduled Tylenol; robaxin             Cards:   -- S/P MVR, PFO closure on 7/22/22  -- HDS off vasopressor and inotropic support  -- Ventricular pacing wires.  -- MAP 60-80, Syst < 130  -- Cleviprex PRN; Nifedipine 30 QD increase to 60 today  --  mg QD, BB, statin  -- Afib with RVR amio gtt transitioned to oral     Pulm:   -- Goal O2 sat > 90%  -- Extubated on 7L by NC, now on 1L   -- ABG PRN  -- Duo-nebs Q4, Breo QD  -- Mediastinal chest tubes x2 to suction, removed 7/25      Renal:  -- Trend BUN/Cr   -- Lasix 40 BID today      FEN / GI:   -- Replace lytes as needed  -- Nutrition: Cardiac diet, 1500 mL fluid restriction  -- GI ppx: famotidine  -- Bowel reg: miralax      ID:   -- Tm: afebrile; WBC stable  -- Kacy-op ancef      Heme/Onc:   -- H/H stable   -- Daily CBC  -- 1150 ml Cell saver given back  -- Aspirin 325mg QD      Endo:   -- BG goal 140-180  -- SSI      PPx:   Feeding: Cardiac diet, 1500 cc fluid restriction   Analgesia/Sedation: none / PRN Oxy + Dilaudid  Thromboembolic prevention: SCDs  HOB >30: Yes  Stress Ulcer ppx: famotidine  Glucose control: Critical care goal 140-180 g/dl, ISS     Lines/Drains/Airway: CVC RIJ, PIV     Dispo/Code Status/Palliative:   -- step down / Full Code.            Harriet Woodard MD  Critical Care - Surgery  [facility] - [facility]

## 2022-07-26 NOTE — PLAN OF CARE
VSS, afebrile. CVP 11. Chest tube and pacer wires removed this AM. Art line d/c with no complications. OOB in chair all day, tolerated well. UOP 1850/shift. BM x1. Tolerating diet well. PO amnio started, amnio gtt will be d/c at 2200. CL dressing changed per RN. 20g peripheral IV placed to left hand with no complications. Transfer orders in place.

## 2022-07-26 NOTE — ASSESSMENT & PLAN NOTE
Kong Celaya is a 52 y.o. male with medical problems including asthma/chronic bronchitis and severe mitral regurgitation now s/p MV repair and PFO closure on 7/22/22.      Neuro/Psych:   -- Pain: Oxy plus dilaudid prn  -- Scheduled Tylenol; robaxin             Cards:   -- S/P MVR, PFO closure on 7/22/22  -- HDS off vasopressor and inotropic support  -- Ventricular pacing wires.  -- MAP 60-80, Syst < 130  -- Cleviprex PRN; Nifedipine 30 QD increase to 60 today  --  mg QD, BB, statin  -- Afib with RVR amio gtt transitioned to oral     Pulm:   -- Goal O2 sat > 90%  -- Extubated on 7L by NC, now on 1L   -- ABG PRN  -- Duo-nebs Q4, Breo QD  -- Mediastinal chest tubes x2 to suction, removed 7/25      Renal:  -- Trend BUN/Cr   -- Lasix 40 BID today      FEN / GI:   -- Replace lytes as needed  -- Nutrition: Cardiac diet, 1500 mL fluid restriction  -- GI ppx: famotidine  -- Bowel reg: miralax      ID:   -- Tm: afebrile; WBC stable  -- Kacy-op ancef      Heme/Onc:   -- H/H stable   -- Daily CBC  -- 1150 ml Cell saver given back  -- Aspirin 325mg QD      Endo:   -- BG goal 140-180  -- SSI      PPx:   Feeding: Cardiac diet, 1500 cc fluid restriction   Analgesia/Sedation: none / PRN Oxy + Dilaudid  Thromboembolic prevention: SCDs  HOB >30: Yes  Stress Ulcer ppx: famotidine  Glucose control: Critical care goal 140-180 g/dl, ISS     Lines/Drains/Airway: CVC RIJ, PIV     Dispo/Code Status/Palliative:   -- step down / Full Code.

## 2022-07-26 NOTE — CARE UPDATE
BG goal 110-140    -A1C   Lab Results   Component Value Date    HGBA1C 5.4 07/21/2022         -HOME REGIMEN: none    -INPATIENT REGIMEN: correction scale     -GLUCOSE TREND FOR THE PAST 24HRS: 101-126    -NO HYPOGYCEMIAS NOTED     -TOLERATING 50% OF PO DIET     -Diet: Diet Cardiac Fluid - 1500mL      Transferred to CSU, ARIADNA. BG at goal without insulin. 4 Days Post-Op      Plan:  Discontinue BG monitoring.   No history of DM. No futher insulin/endocrine needs, will sign off.    Thank you for the consult. Please call/ re-consult if needed.

## 2022-07-27 ENCOUNTER — DOCUMENTATION ONLY (OUTPATIENT)
Dept: CARDIOTHORACIC SURGERY | Facility: CLINIC | Age: 52
End: 2022-07-27
Payer: COMMERCIAL

## 2022-07-27 VITALS
RESPIRATION RATE: 16 BRPM | WEIGHT: 285 LBS | TEMPERATURE: 98 F | HEIGHT: 70 IN | OXYGEN SATURATION: 97 % | BODY MASS INDEX: 40.8 KG/M2 | DIASTOLIC BLOOD PRESSURE: 76 MMHG | HEART RATE: 79 BPM | SYSTOLIC BLOOD PRESSURE: 123 MMHG

## 2022-07-27 LAB
ANION GAP SERPL CALC-SCNC: 10 MMOL/L (ref 8–16)
AV INDEX (PROSTH): 0.82
AV MEAN GRADIENT: 7 MMHG
AV PEAK GRADIENT: 12 MMHG
AV VALVE AREA: 3.63 CM2
AV VELOCITY RATIO: 0.81
BASOPHILS # BLD AUTO: 0.06 K/UL (ref 0–0.2)
BASOPHILS NFR BLD: 0.6 % (ref 0–1.9)
BSA FOR ECHO PROCEDURE: 2.53 M2
BUN SERPL-MCNC: 18 MG/DL (ref 6–20)
CALCIUM SERPL-MCNC: 9 MG/DL (ref 8.7–10.5)
CHLORIDE SERPL-SCNC: 100 MMOL/L (ref 95–110)
CO2 SERPL-SCNC: 25 MMOL/L (ref 23–29)
CREAT SERPL-MCNC: 0.7 MG/DL (ref 0.5–1.4)
CV ECHO LV RWT: 0.26 CM
DIFFERENTIAL METHOD: ABNORMAL
DOP CALC AO PEAK VEL: 1.7 M/S
DOP CALC AO VTI: 30.54 CM
DOP CALC LVOT AREA: 4.4 CM2
DOP CALC LVOT DIAMETER: 2.37 CM
DOP CALC LVOT PEAK VEL: 1.37 M/S
DOP CALC LVOT STROKE VOLUME: 110.85 CM3
DOP CALC MV VTI: 45.13 CM
DOP CALCLVOT PEAK VEL VTI: 25.14 CM
E WAVE DECELERATION TIME: 209.6 MSEC
E/A RATIO: 2.47
E/E' RATIO: 15.13 M/S
ECHO LV POSTERIOR WALL: 0.67 CM (ref 0.6–1.1)
EJECTION FRACTION: 65 %
EOSINOPHIL # BLD AUTO: 0.5 K/UL (ref 0–0.5)
EOSINOPHIL NFR BLD: 5.2 % (ref 0–8)
ERYTHROCYTE [DISTWIDTH] IN BLOOD BY AUTOMATED COUNT: 12.1 % (ref 11.5–14.5)
EST. GFR  (AFRICAN AMERICAN): >60 ML/MIN/1.73 M^2
EST. GFR  (NON AFRICAN AMERICAN): >60 ML/MIN/1.73 M^2
FRACTIONAL SHORTENING: 34 % (ref 28–44)
GLUCOSE SERPL-MCNC: 92 MG/DL (ref 70–110)
HCT VFR BLD AUTO: 32.2 % (ref 40–54)
HGB BLD-MCNC: 10.8 G/DL (ref 14–18)
HR MV ECHO: 81 BPM
IMM GRANULOCYTES # BLD AUTO: 0.09 K/UL (ref 0–0.04)
IMM GRANULOCYTES NFR BLD AUTO: 1 % (ref 0–0.5)
INR PPP: 1 (ref 0.8–1.2)
INTERVENTRICULAR SEPTUM: 0.68 CM (ref 0.6–1.1)
LA MAJOR: 5.4 CM
LA MINOR: 4.93 CM
LA WIDTH: 4.06 CM
LEFT ATRIUM SIZE: 4.19 CM
LEFT ATRIUM VOLUME INDEX MOD: 24.4 ML/M2
LEFT ATRIUM VOLUME INDEX: 30.7 ML/M2
LEFT ATRIUM VOLUME MOD: 59.31 CM3
LEFT ATRIUM VOLUME: 74.53 CM3
LEFT INTERNAL DIMENSION IN SYSTOLE: 3.44 CM (ref 2.1–4)
LEFT VENTRICLE DIASTOLIC VOLUME INDEX: 53.69 ML/M2
LEFT VENTRICLE DIASTOLIC VOLUME: 130.46 ML
LEFT VENTRICLE MASS INDEX: 49 G/M2
LEFT VENTRICLE SYSTOLIC VOLUME INDEX: 20 ML/M2
LEFT VENTRICLE SYSTOLIC VOLUME: 48.66 ML
LEFT VENTRICULAR INTERNAL DIMENSION IN DIASTOLE: 5.22 CM (ref 3.5–6)
LEFT VENTRICULAR MASS: 118.21 G
LV LATERAL E/E' RATIO: 15.13 M/S
LV SEPTAL E/E' RATIO: 15.13 M/S
LYMPHOCYTES # BLD AUTO: 2.2 K/UL (ref 1–4.8)
LYMPHOCYTES NFR BLD: 24.1 % (ref 18–48)
MAGNESIUM SERPL-MCNC: 1.9 MG/DL (ref 1.6–2.6)
MCH RBC QN AUTO: 32.2 PG (ref 27–31)
MCHC RBC AUTO-ENTMCNC: 33.5 G/DL (ref 32–36)
MCV RBC AUTO: 96 FL (ref 82–98)
MONOCYTES # BLD AUTO: 1.2 K/UL (ref 0.3–1)
MONOCYTES NFR BLD: 13.2 % (ref 4–15)
MV A" WAVE DURATION": 6.85 MSEC
MV MEAN GRADIENT: 3 MMHG
MV PEAK A VEL: 0.49 M/S
MV PEAK E VEL: 1.21 M/S
MV PEAK GRADIENT: 10 MMHG
MV STENOSIS PRESSURE HALF TIME: 60.78 MS
MV VALVE AREA BY CONTINUITY EQUATION: 2.46 CM2
MV VALVE AREA P 1/2 METHOD: 3.62 CM2
NEUTROPHILS # BLD AUTO: 5.2 K/UL (ref 1.8–7.7)
NEUTROPHILS NFR BLD: 55.9 % (ref 38–73)
NRBC BLD-RTO: 0 /100 WBC
PHOSPHATE SERPL-MCNC: 3.7 MG/DL (ref 2.7–4.5)
PLATELET # BLD AUTO: 180 K/UL (ref 150–450)
PMV BLD AUTO: 11.2 FL (ref 9.2–12.9)
POTASSIUM SERPL-SCNC: 4.1 MMOL/L (ref 3.5–5.1)
PROTHROMBIN TIME: 10.5 SEC (ref 9–12.5)
PULM VEIN S/D RATIO: 0.49
PV PEAK D VEL: 0.71 M/S
PV PEAK S VEL: 0.35 M/S
QEF: 65 %
RA MAJOR: 4.84 CM
RA PRESSURE: 3 MMHG
RA WIDTH: 3.3 CM
RBC # BLD AUTO: 3.35 M/UL (ref 4.6–6.2)
RIGHT VENTRICULAR END-DIASTOLIC DIMENSION: 3.77 CM
RV TISSUE DOPPLER FREE WALL SYSTOLIC VELOCITY 1 (APICAL 4 CHAMBER VIEW): 11.57 CM/S
SINUS: 3.64 CM
SODIUM SERPL-SCNC: 135 MMOL/L (ref 136–145)
STJ: 3.49 CM
TDI LATERAL: 0.08 M/S
TDI SEPTAL: 0.08 M/S
TDI: 0.08 M/S
TRICUSPID ANNULAR PLANE SYSTOLIC EXCURSION: 1.62 CM
WBC # BLD AUTO: 9.26 K/UL (ref 3.9–12.7)

## 2022-07-27 PROCEDURE — 25000003 PHARM REV CODE 250

## 2022-07-27 PROCEDURE — 94761 N-INVAS EAR/PLS OXIMETRY MLT: CPT

## 2022-07-27 PROCEDURE — 85025 COMPLETE CBC W/AUTO DIFF WBC: CPT | Performed by: NURSE PRACTITIONER

## 2022-07-27 PROCEDURE — 25000242 PHARM REV CODE 250 ALT 637 W/ HCPCS: Performed by: THORACIC SURGERY (CARDIOTHORACIC VASCULAR SURGERY)

## 2022-07-27 PROCEDURE — 94799 UNLISTED PULMONARY SVC/PX: CPT

## 2022-07-27 PROCEDURE — 25000003 PHARM REV CODE 250: Performed by: NURSE PRACTITIONER

## 2022-07-27 PROCEDURE — 83735 ASSAY OF MAGNESIUM: CPT | Performed by: NURSE PRACTITIONER

## 2022-07-27 PROCEDURE — 25000003 PHARM REV CODE 250: Performed by: STUDENT IN AN ORGANIZED HEALTH CARE EDUCATION/TRAINING PROGRAM

## 2022-07-27 PROCEDURE — 84100 ASSAY OF PHOSPHORUS: CPT | Performed by: NURSE PRACTITIONER

## 2022-07-27 PROCEDURE — 63600175 PHARM REV CODE 636 W HCPCS: Performed by: STUDENT IN AN ORGANIZED HEALTH CARE EDUCATION/TRAINING PROGRAM

## 2022-07-27 PROCEDURE — 80048 BASIC METABOLIC PNL TOTAL CA: CPT | Performed by: NURSE PRACTITIONER

## 2022-07-27 PROCEDURE — 94640 AIRWAY INHALATION TREATMENT: CPT

## 2022-07-27 PROCEDURE — 85610 PROTHROMBIN TIME: CPT | Performed by: NURSE PRACTITIONER

## 2022-07-27 PROCEDURE — 36415 COLL VENOUS BLD VENIPUNCTURE: CPT | Performed by: NURSE PRACTITIONER

## 2022-07-27 PROCEDURE — 97535 SELF CARE MNGMENT TRAINING: CPT

## 2022-07-27 RX ORDER — ACETAMINOPHEN 325 MG/1
650 TABLET ORAL EVERY 8 HOURS
Qty: 60 TABLET | Refills: 0 | Status: SHIPPED | OUTPATIENT
Start: 2022-07-27 | End: 2022-08-06

## 2022-07-27 RX ORDER — FUROSEMIDE 20 MG/1
20 TABLET ORAL 2 TIMES DAILY
Qty: 45 TABLET | Refills: 1 | Status: SHIPPED | OUTPATIENT
Start: 2022-07-27 | End: 2022-08-18

## 2022-07-27 RX ORDER — METHOCARBAMOL 500 MG/1
500 TABLET, FILM COATED ORAL 4 TIMES DAILY
Qty: 40 TABLET | Refills: 0 | Status: SHIPPED | OUTPATIENT
Start: 2022-07-27 | End: 2022-08-02 | Stop reason: SDUPTHER

## 2022-07-27 RX ORDER — DOCUSATE SODIUM 100 MG/1
100 CAPSULE, LIQUID FILLED ORAL 2 TIMES DAILY
Qty: 14 CAPSULE | Refills: 0 | Status: SHIPPED | OUTPATIENT
Start: 2022-07-27 | End: 2022-08-03

## 2022-07-27 RX ORDER — AMIODARONE HYDROCHLORIDE 200 MG/1
200 TABLET ORAL 2 TIMES DAILY
Qty: 60 TABLET | Refills: 1 | Status: SHIPPED | OUTPATIENT
Start: 2022-07-27 | End: 2022-08-18

## 2022-07-27 RX ORDER — ATORVASTATIN CALCIUM 40 MG/1
40 TABLET, FILM COATED ORAL DAILY
Qty: 90 TABLET | Refills: 3 | Status: SHIPPED | OUTPATIENT
Start: 2022-07-27 | End: 2023-08-22

## 2022-07-27 RX ORDER — NIFEDIPINE 30 MG/1
30 TABLET, EXTENDED RELEASE ORAL DAILY
Qty: 30 TABLET | Refills: 11 | Status: SHIPPED | OUTPATIENT
Start: 2022-07-27 | End: 2023-08-22

## 2022-07-27 RX ORDER — FAMOTIDINE 20 MG/1
20 TABLET, FILM COATED ORAL 2 TIMES DAILY
Qty: 60 TABLET | Refills: 11 | Status: SHIPPED | OUTPATIENT
Start: 2022-07-27 | End: 2022-08-18

## 2022-07-27 RX ORDER — ASPIRIN 325 MG
325 TABLET ORAL DAILY
Qty: 360 TABLET | Refills: 0 | Status: ON HOLD | OUTPATIENT
Start: 2022-07-27 | End: 2023-08-22

## 2022-07-27 RX ORDER — METOPROLOL TARTRATE 25 MG/1
25 TABLET, FILM COATED ORAL 2 TIMES DAILY
Qty: 60 TABLET | Refills: 11 | Status: SHIPPED | OUTPATIENT
Start: 2022-07-27 | End: 2023-08-22

## 2022-07-27 RX ORDER — LEVALBUTEROL INHALATION SOLUTION 0.63 MG/3ML
1 SOLUTION RESPIRATORY (INHALATION) EVERY 4 HOURS PRN
Qty: 1 EACH | Refills: 0 | Status: SHIPPED | OUTPATIENT
Start: 2022-07-27 | End: 2023-08-22

## 2022-07-27 RX ORDER — POTASSIUM CHLORIDE 20 MEQ/1
20 TABLET, EXTENDED RELEASE ORAL 2 TIMES DAILY
Qty: 45 TABLET | Refills: 1 | Status: SHIPPED | OUTPATIENT
Start: 2022-07-27 | End: 2022-08-18

## 2022-07-27 RX ORDER — OXYCODONE HYDROCHLORIDE 5 MG/1
5 TABLET ORAL EVERY 4 HOURS PRN
Qty: 42 TABLET | Refills: 0 | Status: SHIPPED | OUTPATIENT
Start: 2022-07-27 | End: 2022-08-18

## 2022-07-27 RX ADMIN — METHOCARBAMOL 500 MG: 500 TABLET ORAL at 08:07

## 2022-07-27 RX ADMIN — ACETAMINOPHEN 650 MG: 325 TABLET ORAL at 04:07

## 2022-07-27 RX ADMIN — LEVALBUTEROL HYDROCHLORIDE 0.63 MG: 0.63 SOLUTION RESPIRATORY (INHALATION) at 07:07

## 2022-07-27 RX ADMIN — FUROSEMIDE 40 MG: 10 INJECTION, SOLUTION INTRAMUSCULAR; INTRAVENOUS at 06:07

## 2022-07-27 RX ADMIN — ATORVASTATIN CALCIUM 40 MG: 20 TABLET, FILM COATED ORAL at 08:07

## 2022-07-27 RX ADMIN — METOPROLOL TARTRATE 25 MG: 25 TABLET, FILM COATED ORAL at 08:07

## 2022-07-27 RX ADMIN — METHOCARBAMOL 500 MG: 500 TABLET ORAL at 12:07

## 2022-07-27 RX ADMIN — FAMOTIDINE 20 MG: 20 TABLET ORAL at 08:07

## 2022-07-27 RX ADMIN — LEVALBUTEROL HYDROCHLORIDE 0.63 MG: 0.63 SOLUTION RESPIRATORY (INHALATION) at 12:07

## 2022-07-27 RX ADMIN — FLUTICASONE FUROATE AND VILANTEROL TRIFENATATE 1 PUFF: 200; 25 POWDER RESPIRATORY (INHALATION) at 07:07

## 2022-07-27 RX ADMIN — POTASSIUM CHLORIDE 40 MEQ: 1500 TABLET, EXTENDED RELEASE ORAL at 08:07

## 2022-07-27 RX ADMIN — NIFEDIPINE 30 MG: 30 TABLET, FILM COATED, EXTENDED RELEASE ORAL at 08:07

## 2022-07-27 RX ADMIN — AMIODARONE HYDROCHLORIDE 200 MG: 200 TABLET ORAL at 08:07

## 2022-07-27 RX ADMIN — DOCUSATE SODIUM 100 MG: 100 CAPSULE ORAL at 08:07

## 2022-07-27 RX ADMIN — ASPIRIN 325 MG ORAL TABLET 325 MG: 325 PILL ORAL at 08:07

## 2022-07-27 NOTE — HOSPITAL COURSE
On 7/22/2022 the patient was taken to the Operating Room for the above stated procedure. Please see the previously dictated operative report for complete details. Postoperatively, the patient was taken from the  Operating Room to the ICU where the vital signs were monitored and pain was kept under control. The patient was weaned from the drips and extubated in the ICU per protocol. Once hemodynamically stable, the patient was transferred to the Cardiac Step-Down floor for continued strengthening and ambulation. On postoperative day 5, the patient was ready for discharge to home. At the time of discharge, the patient was ambulating unassisted. Pain was well controlled with oral analgesics and the patient was tolerating the diet.  Endocrine was consulted during admission to help manage post op hyperglycemia.  Per endocrine, there are no discharge needs.  Therapy recommendations are for home with no needs.  Patient will have a pre-discharge ECHO.     MOBILITY AND ACTIVITY: As tolerated. Patient may shower. No heavy lifting of greater than 5 pounds and no driving.     DIET: A Cardiac diet      WOUND CARE INSTRUCTIONS: Check for redness, swelling and drainage around the  incision or wound. Patient is to call for any obvious bleeding, drainage, pus from the wound, unusual problems or difficulties or temperature of greater than 101   degrees.     FOLLOWUP: Follow up with Dr. Gastelum in approximately 3 weeks. Prior to this  appointment, the patient will have a chest x-ray and EKG.     Patient not placed on Ace-Inhibitor at the time of discharge due to potential for hypotension      DISCHARGE CONDITION: At the time of discharge, the patient was in sinus rhythm and afebrile with stable vital signs.

## 2022-07-27 NOTE — PLAN OF CARE
[facility] - Cardiology Stepdown  Discharge Final Note    Primary Care Provider: Dax Thomas MD    Expected Discharge Date: 7/27/2022    Final Discharge Note (most recent)     Final Note - 07/27/22 1457        Final Note    Assessment Type Final Discharge Note     Anticipated Discharge Disposition Home or Self Care     Hospital Resources/Appts/Education Provided Provided patient/caregiver with written discharge plan information;Appointments scheduled by Navigator/Coordinator                 Important Message from Medicare              MAVERICK Stevenson, RN    [phone]

## 2022-07-27 NOTE — PLAN OF CARE
Pt maintained free from falls/ trauma/ injuries and skin breakdown. His pain his controlled with tylenol scheduled. Pt ambulate independently and was reinforced with sternal precaution. Plan of care reviewed. Pt verbalized understanding. All questions and concerns addressed. Will continue to monitor.     Localized Dermabrasion Text: The patient was draped in routine manner.  Localized dermabrasion using 3 x 17 mm wire brush was performed in routine manner to papillary dermis. This spot dermabrasion is being performed to complete skin cancer reconstruction. It also will eliminate the other sun damaged precancerous cells that are known to be part of the regional effect of a lifetime's worth of sun exposure. This localized dermabrasion is therapeutic and should not be considered cosmetic in any regard. Localized Dermabrasion With Wire Brush Text: The patient was draped in routine manner.  Localized dermabrasion using 3 x 17 mm wire brush was performed in routine manner to papillary dermis. This spot dermabrasion is being performed to complete skin cancer reconstruction. It also will eliminate the other sun damaged precancerous cells that are known to be part of the regional effect of a lifetime's worth of sun exposure. This localized dermabrasion is therapeutic and should not be considered cosmetic in any regard.

## 2022-07-27 NOTE — PLAN OF CARE
Problem: Occupational Therapy  Goal: Occupational Therapy Goal  Description: Goals to be met by: 8/6/22     Patient will increase functional independence with ADLs by performing:    Feeding with [place].  UE Dressing with [place].  LE Dressing with [place].  Grooming while standing at sink with [place].  Toileting from toilet with [place] for hygiene and clothing management.   Toilet transfer to toilet with [place].  Pt will engage in functional mobility to simulate household distances in order to maximize functional activity tolerance required for engagement in occupations of choice with supervision using LRAD.      Outcome: Met

## 2022-07-27 NOTE — HPI
Mr. Celaya is a very pleasant 52-year-old gentleman who initially presented with lifestyle limiting dyspnea on exertion fatigue, and lower extremity edema.  He had a thorough evaluation which demonstrated no hemodynamically significant coronary lesions and severe mitral regurgitation.  He was also found to have a patent foramen ovale.  He now presents for mitral valve repair and closure of his patent foramen ovale.

## 2022-07-27 NOTE — DISCHARGE SUMMARY
[facility] - Cardiology Stepdown  Cardiothoracic Surgery  Discharge Summary      Patient Name: Kong Celaya  MRN: [id number]  Admission Date: 7/22/2022  Hospital Length of Stay: 5 days  Discharge Date and Time:  07/27/2022 9:36 AM  Attending Physician: Jet Gastelum MD   Discharging Provider: Cathy Hutchison NP  Primary Care Provider: Dax Thomas MD    HPI:   Mr. Celaya is a very pleasant 52-year-old gentleman who initially presented with lifestyle limiting dyspnea on exertion fatigue, and lower extremity edema.  He had a thorough evaluation which demonstrated no hemodynamically significant coronary lesions and severe mitral regurgitation.  He was also found to have a patent foramen ovale.  He now presents for mitral valve repair and closure of his patent foramen ovale.      Procedure(s) (LRB):  Valvuloplasty, Mitral (N/A)      Indwelling Lines/Drains at time of discharge:   Lines/Drains/Airways     None               Hospital Course: On 7/22/2022 the patient was taken to the Operating Room for the above stated procedure. Please see the previously dictated operative report for complete details. Postoperatively, the patient was taken from the  Operating Room to the ICU where the vital signs were monitored and pain was kept under control. The patient was weaned from the drips and extubated in the ICU per protocol. Once hemodynamically stable, the patient was transferred to the Cardiac Step-Down floor for continued strengthening and ambulation. On postoperative day 5, the patient was ready for discharge to home. At the time of discharge, the patient was ambulating unassisted. Pain was well controlled with oral analgesics and the patient was tolerating the diet.  Endocrine was consulted during admission to help manage post op hyperglycemia.  Per endocrine, there are no discharge needs.  Therapy recommendations are for home with no needs.  Patient will have a pre-discharge ECHO.     MOBILITY AND  ACTIVITY: As tolerated. Patient may shower. No heavy lifting of greater than 5 pounds and no driving.     DIET: A Cardiac diet      WOUND CARE INSTRUCTIONS: Check for redness, swelling and drainage around the  incision or wound. Patient is to call for any obvious bleeding, drainage, pus from the wound, unusual problems or difficulties or temperature of greater than 101   degrees.     FOLLOWUP: Follow up with Dr. Gastelum in approximately 3 weeks. Prior to this  appointment, the patient will have a chest x-ray and EKG.     Patient not placed on Ace-Inhibitor at the time of discharge due to potential for hypotension      DISCHARGE CONDITION: At the time of discharge, the patient was in sinus rhythm and afebrile with stable vital signs.         Goals of Care Treatment Preferences:  Code Status: Full Code      Consults (From admission, onward)        Status Ordering Provider     Consult to Endocrinology  Once        Provider:  (Not yet assigned)    Completed LORENE URBINA     Consult Case Management/Social Work  Once        Provider:  (Not yet assigned)    Acknowledged LORENE URBINA            Pending Diagnostic Studies:     Procedure Component Value Units Date/Time    Echo [[id number]]     Order Status: Sent Lab Status: No result           No new Assessment & Plan notes have been filed under this hospital service since the last note was generated.  Service: Cardiothoracic Surgery    Final Active Diagnoses:    Diagnosis Date Noted POA    PRINCIPAL PROBLEM:  Moderate to severe mitral regurgitation [I34.0] 05/12/2022 Yes    Encounter for weaning from ventilator [Z99.11]  Not Applicable    S/P MVR (mitral valve replacement) [Z95.2]  Not Applicable    Hyperglycemia [R73.9] 07/22/2022 Unknown    Surgical wound present [T14.8XXA] 07/22/2022 Unknown      Problems Resolved During this Admission:      Discharged Condition: stable    Disposition: Home or Self Care    Follow Up:    Patient Instructions:   No discharge  procedures on file.  Medications:  Reconciled Home Medications:      Medication List      START taking these medications    acetaminophen 325 MG tablet  Commonly known as: TYLENOL  Take 2 tablets (650 mg total) by mouth every 8 (eight) hours. for 10 days     amiodarone 200 MG Tab  Commonly known as: PACERONE  Take 1 tablet (200 mg total) by mouth 2 (two) times daily.     aspirin 325 MG tablet  Take 1 tablet (325 mg total) by mouth once daily.  Replaces: aspirin 81 MG EC tablet     atorvastatin 40 MG tablet  Commonly known as: LIPITOR  Take 1 tablet (40 mg total) by mouth once daily.  Replaces: rosuvastatin 10 MG tablet     docusate sodium 100 MG capsule  Commonly known as: COLACE  Take 1 capsule (100 mg total) by mouth 2 (two) times daily. for 7 days     famotidine 20 MG tablet  Commonly known as: PEPCID  Take 1 tablet (20 mg total) by mouth 2 (two) times daily.     methocarbamoL 500 MG Tab  Commonly known as: ROBAXIN  Take 1 tablet (500 mg total) by mouth 4 (four) times daily. for 10 days     metoprolol tartrate 25 MG tablet  Commonly known as: LOPRESSOR  Take 1 tablet (25 mg total) by mouth 2 (two) times daily.     NIFEdipine 30 MG (OSM) 24 hr tablet  Commonly known as: PROCARDIA-XL  Take 1 tablet (30 mg total) by mouth once daily.     oxyCODONE 5 MG immediate release tablet  Commonly known as: ROXICODONE  Take 1 tablet (5 mg total) by mouth every 4 (four) hours as needed for Pain.     potassium chloride SA 20 MEQ tablet  Commonly known as: K-DUR,KLOR-CON  Take 1 tablet (20 mEq total) by mouth 2 (two) times daily.        CHANGE how you take these medications    furosemide 20 MG tablet  Commonly known as: LASIX  Take 1 tablet (20 mg total) by mouth 2 (two) times daily.  What changed: when to take this        CONTINUE taking these medications    * albuterol 0.63 mg/3 mL Nebu  Commonly known as: ACCUNEB  Take 0.63 mg by nebulization every 6 (six) hours as needed.     * albuterol 2.5 mg /3 mL (0.083 %) nebulizer  solution  Commonly known as: PROVENTIL  Take 2.5 mg by nebulization every 4 (four) hours as needed.     * albuterol 90 mcg/actuation inhaler  Commonly known as: PROVENTIL/VENTOLIN HFA  Inhale 2 puffs into the lungs every 6 (six) hours as needed for Wheezing. Rescue     * albuterol 90 mcg/actuation inhaler  Commonly known as: PROVENTIL/VENTOLIN HFA  albuterol sulfate HFA 90 mcg/actuation aerosol inhaler   INHALE TWO PUFFS BY MOUTH TWICE A DAY AS NEEDED     BREO ELLIPTA 200-25 mcg/dose Dsdv diskus inhaler  Generic drug: fluticasone furoate-vilanteroL  Inhale 1 puff into the lungs once daily. Controller     CLENPIQ 10 mg-3.5 gram -12 gram/160 mL Soln  Generic drug: sod picosulf-mag ox-citric ac  use as directed     SYMBICORT 160-4.5 mcg/actuation Hfaa  Generic drug: budesonide-formoterol 160-4.5 mcg  Inhale 2 puffs into the lungs 2 (two) times daily.         * This list has 4 medication(s) that are the same as other medications prescribed for you. Read the directions carefully, and ask your doctor or other care provider to review them with you.            STOP taking these medications    aspirin 81 MG EC tablet  Commonly known as: ECOTRIN  Replaced by: aspirin 325 MG tablet      MG tablet  Generic drug: ibuprofen     rosuvastatin 10 MG tablet  Commonly known as: CRESTOR  Replaced by: atorvastatin 40 MG tablet          Time spent on the discharge of patient: 40 minutes    Cathy Hutchison NP  Cardiothoracic Surgery  [facility] - Cardiology Stepdown

## 2022-07-27 NOTE — PT/OT/SLP PROGRESS
Occupational Therapy and Discharge      Patient Name:  Kong Celaya   MRN:  [id number]    Patient found with HOB elevated and wife at bedside. Pt with discharge orders in place. Discussion with pt and spouse regarding ADLs. Pt reporting only concerns is difficulty with posterior pericare. Reports this is getting better as pt is down 10 lbs from hospital admit. Pt and spouse report spouse and niece can perform pericare at home. Pt encouraged to try and perform first to improve ROM and independence. Pt reports ambulating in halls prior to OT session. All OT goals met with the exception of toileting. OT to discontinue current orders. Pt is safe to discharge home with no additional therapy needs and no DME needs.    1 self care charged ([id number])  7/27/2022

## 2022-07-27 NOTE — NURSING
Patient is ready for discharge. Patient stable alert and oriented. IVs and TELE removed. No complaints of pain. Discussed discharge plan. Reviewed medications and side effects, appointments, and answered questions with patient and family. RX given to patient @ bedside. Pt left unit via wheelchair with transport.

## 2022-07-27 NOTE — PROGRESS NOTES
Met with pt and his wife at bedside and reviewed wound care instructions for pt's midsternal and chest tube site incisions.  Reviewed daily inspection and cleaning of surgical incisions and instructed pt to call the clinic with any questions or concerns.  Pt provided with a hand-out (see below) which contains detailed instructions on daily wound care inspection and care.  Pt reminded of his 5 pound lifting, pushing, and pulling restrictions for the first 6 weeks following his surgery and to refrain from driving until released by Dr. Gastelum.  Pt instructed to monitor his BP daily and was provided with parameters for when to notify the clinic. Pt also instructed to take a dry weight daily (and instructed on process) and to notify the clinic if has a weight gain of 3 lbs or more within 24 hours. Pt informed of his post-op appts on August 18 and was provided with a copy of his appts for that day.  Pt and his wife verbalized understanding of all instructions.          Showering   If your incisions are healing and there is no drainage - it is ok to take a shower.  Shower with your back to the shower spray.  It is ok for your incision to get wet, but the shower spray should not directly hit your chest.  Do not soak in a tub.  The water temperature should be warm -- not too hot or cold. Extreme water temperatures can cause you to feel faint.  It is expected that you wash your incisions when you shower, however only use soap and water to cleanse the sites.  -Use normal bar soap, not perfumed soap or body wash. During your recovery, do not try a new brand of soap.  -Place soapy water on your hand or a clean washcloth and gently wash your incisions using an up-and-down motion.  -Do not apply ointments, oils, or salves to your incisions.  -Pat the skin gently to dry.     1. Wash your hands before and after caring for or touching your incisions.  2. Look in the mirror daily. Inspect your incisions for redness, drainage and  warmth. Your incisions should be healing; there should NOT be an increase in opening.  3. Gently wash your incisions everyday with soap and warm water using a clean washcloth, or your hand and light touch.  4. Gently pat dry with a clean towel.  5. You do not need to cover your incisions unless they are draining.  If you are experiencing drainage, it is important to call your doctor.  Monday - Friday, from 8:00am - 5pm, call [phone].  Outside of these hours, please call [phone], which is a 24-hour nurse care advice line.     When to call your doctor:  -Increased drainage or oozing from the incision(s)  -Increased opening of the incision line(s) - there should not be gaps or pulling apart areas of the incision(s)  -Redness along the incision(s) - if the incisions were red or pink when you left the hospital, they should be improving and not becoming more red  -Warmth along the incision line(s)  -Increased body temperature / Fever - greater than 101 degrees Fahrenheit  -If you have diabetes and your blood sugar levels begin to vary

## 2022-07-28 ENCOUNTER — PATIENT OUTREACH (OUTPATIENT)
Dept: ADMINISTRATIVE | Facility: CLINIC | Age: 52
End: 2022-07-28
Payer: COMMERCIAL

## 2022-07-28 NOTE — PROGRESS NOTES
C3 nurse spoke with Kong Celaya and his wife for a TCC post hospital discharge follow up call. The patient reports does not have a scheduled HOSFU appointment. C3 nurse was unable to schedule [facility]FU appointment [facility]. Patient advised to contact their PCP to schedule a HOSPFU within 5-7 days.

## 2022-08-02 ENCOUNTER — TELEPHONE (OUTPATIENT)
Dept: CARDIOTHORACIC SURGERY | Facility: CLINIC | Age: 52
End: 2022-08-02
Payer: COMMERCIAL

## 2022-08-02 RX ORDER — METHOCARBAMOL 500 MG/1
500 TABLET, FILM COATED ORAL 4 TIMES DAILY
Qty: 40 TABLET | Refills: 0 | Status: SHIPPED | OUTPATIENT
Start: 2022-08-02 | End: 2022-08-12

## 2022-08-02 NOTE — TELEPHONE ENCOUNTER
Called pt following hospital discharge.  Reiterated the need for pt to clean his incision everyday with soap and water, and to walk as much as he can.  Reminded pt not to drive for the first 4 weeks after surgery, and to refrain from lifting, pushing, and pulling anything greater than 5 pounds for the first 6 weeks following his surgery.  Instructed pt to perform daily weights.  Pt instructed to notify the clinic if he has a weight gain greater than 3 pounds in one day.  Pt instructed to call the clinic with any questions or concerns.  Pt verbalized understanding.

## 2022-08-10 ENCOUNTER — TELEPHONE (OUTPATIENT)
Dept: CARDIOTHORACIC SURGERY | Facility: CLINIC | Age: 52
End: 2022-08-10
Payer: COMMERCIAL

## 2022-08-10 NOTE — TELEPHONE ENCOUNTER
Received call from pt and pt's wife. Pt states he is experiencing a lot of pain across his lower chest beneath his incision and wrapping around toward his back. Pt describes it as feeling like his chest is ripping open. Pt states he is most comfortable in an upright sitting position and has difficulty getting comfortable at night. Pt states he has been taking 500 mg tylenol every 8 hours for the pain. Advised pt he can take 1000 mg tylenol (2 extra strength tablets) every 6 hours for pain as long as he is not taking any other medications containing tylenol. Pt also has a prescription for robaxin and should use that as needed according to prescription. Pt may also try over the counter lidocaine patches on his back only (not on incision) and a heating pad on his back only (not on incision). Pt verbalized understanding of all information. Will follow up with pt tomorrow morning.

## 2022-08-11 ENCOUNTER — TELEPHONE (OUTPATIENT)
Dept: CARDIOTHORACIC SURGERY | Facility: CLINIC | Age: 52
End: 2022-08-11
Payer: COMMERCIAL

## 2022-08-11 RX ORDER — GABAPENTIN 300 MG/1
300 CAPSULE ORAL 3 TIMES DAILY
Qty: 15 CAPSULE | Refills: 0 | Status: SHIPPED | OUTPATIENT
Start: 2022-08-11 | End: 2022-08-18

## 2022-08-11 NOTE — TELEPHONE ENCOUNTER
Called to follow up with pt. Pt states he is still in a lot of pain, cannot get comfortable, and cannot sleep. Has increased Tylenol dosage to 1000 mg every 6 hours and is using lidocaine patches for his back. Discussed with CTS provider who will prescribed limited dose of gabapentin. If it helps with his pain, can prescribe more. Relayed this information to pt. Pt verbalized understanding. Will follow up again tomorrow.

## 2022-08-18 ENCOUNTER — HOSPITAL ENCOUNTER (OUTPATIENT)
Dept: RADIOLOGY | Facility: HOSPITAL | Age: 52
Discharge: HOME | End: 2022-08-18
Attending: THORACIC SURGERY (CARDIOTHORACIC VASCULAR SURGERY)
Payer: COMMERCIAL

## 2022-08-18 ENCOUNTER — OFFICE VISIT (OUTPATIENT)
Dept: CARDIOTHORACIC SURGERY | Facility: CLINIC | Age: 52
End: 2022-08-18
Payer: COMMERCIAL

## 2022-08-18 ENCOUNTER — HOSPITAL ENCOUNTER (OUTPATIENT)
Dept: CARDIOLOGY | Facility: CLINIC | Age: 52
Discharge: HOME | End: 2022-08-18
Payer: COMMERCIAL

## 2022-08-18 VITALS
OXYGEN SATURATION: 96 % | SYSTOLIC BLOOD PRESSURE: 108 MMHG | WEIGHT: 276 LBS | BODY MASS INDEX: 39.51 KG/M2 | HEIGHT: 70 IN | HEART RATE: 79 BPM | RESPIRATION RATE: 18 BRPM | DIASTOLIC BLOOD PRESSURE: 72 MMHG

## 2022-08-18 DIAGNOSIS — Z95.2 S/P MVR (MITRAL VALVE REPLACEMENT): Primary | ICD-10-CM

## 2022-08-18 DIAGNOSIS — Z98.890 S/P MITRAL VALVE REPAIR: ICD-10-CM

## 2022-08-18 PROCEDURE — 93010 EKG 12-LEAD: ICD-10-PCS | Mod: S$GLB,,, | Performed by: INTERNAL MEDICINE

## 2022-08-18 PROCEDURE — 99999 PR PBB SHADOW E&M-EST. PATIENT-LVL IV: CPT | Mod: PBBFAC,,, | Performed by: THORACIC SURGERY (CARDIOTHORACIC VASCULAR SURGERY)

## 2022-08-18 PROCEDURE — 93005 ELECTROCARDIOGRAM TRACING: CPT | Mod: S$GLB,,, | Performed by: THORACIC SURGERY (CARDIOTHORACIC VASCULAR SURGERY)

## 2022-08-18 PROCEDURE — 71046 XR CHEST PA AND LATERAL: ICD-10-PCS | Mod: 26,,, | Performed by: RADIOLOGY

## 2022-08-18 PROCEDURE — 99024 PR POST-OP FOLLOW-UP VISIT: ICD-10-PCS | Mod: S$GLB,,, | Performed by: THORACIC SURGERY (CARDIOTHORACIC VASCULAR SURGERY)

## 2022-08-18 PROCEDURE — 93010 ELECTROCARDIOGRAM REPORT: CPT | Mod: S$GLB,,, | Performed by: INTERNAL MEDICINE

## 2022-08-18 PROCEDURE — 99024 POSTOP FOLLOW-UP VISIT: CPT | Mod: S$GLB,,, | Performed by: THORACIC SURGERY (CARDIOTHORACIC VASCULAR SURGERY)

## 2022-08-18 PROCEDURE — 99999 PR PBB SHADOW E&M-EST. PATIENT-LVL IV: ICD-10-PCS | Mod: PBBFAC,,, | Performed by: THORACIC SURGERY (CARDIOTHORACIC VASCULAR SURGERY)

## 2022-08-18 PROCEDURE — 71046 X-RAY EXAM CHEST 2 VIEWS: CPT | Mod: TC,FY

## 2022-08-18 PROCEDURE — 93005 EKG 12-LEAD: ICD-10-PCS | Mod: S$GLB,,, | Performed by: THORACIC SURGERY (CARDIOTHORACIC VASCULAR SURGERY)

## 2022-08-18 PROCEDURE — 71046 X-RAY EXAM CHEST 2 VIEWS: CPT | Mod: 26,,, | Performed by: RADIOLOGY

## 2022-08-18 RX ORDER — METHOCARBAMOL 500 MG/1
TABLET, FILM COATED ORAL
COMMUNITY
End: 2022-08-18 | Stop reason: SDUPTHER

## 2022-08-18 RX ORDER — ATORVASTATIN CALCIUM 40 MG/1
TABLET, FILM COATED ORAL
COMMUNITY
End: 2022-08-18

## 2022-08-18 RX ORDER — ACETAMINOPHEN 325 MG/1
500 TABLET ORAL
COMMUNITY
End: 2022-08-18

## 2022-08-18 RX ORDER — DOCUSATE SODIUM 100 MG/1
CAPSULE, LIQUID FILLED ORAL
COMMUNITY
End: 2022-08-18

## 2022-08-18 RX ORDER — METHOCARBAMOL 500 MG/1
500 TABLET, FILM COATED ORAL 4 TIMES DAILY
Qty: 40 TABLET | Refills: 0 | Status: SHIPPED | OUTPATIENT
Start: 2022-08-18 | End: 2022-08-28

## 2022-08-18 NOTE — PROGRESS NOTES
Patient seen and examined. Patient is progressively increasing activity. No significant complaints other than bilateral leg numbness and right side rib pain.     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  2)  Closure of patent foramen ovale      Plan:  Can begin driving   Can begin cardiac rehab 6 weeks after operation   We will refer to cardiology to assume care   DC lasix, potassium         No scheduled appointment, RTC prn

## 2022-08-18 NOTE — PATIENT INSTRUCTIONS
Please make appointments to check in with your PCP and Cardiologist in the next 2 to 6 weeks.    Continue walking during cooler parts of the day or early evening to build up your endurance.     You may drive, if you have power steering.    Your lifting restriction is still in place until you are 12 weeks out from your surgery:     ** 5 pounds first 6 weeks after surgery **   **20 pounds for weeks 7 - 12 after surgery **    COVID Precautions:    Continue to take precautions against COVID. Mask up when around unknown people, wash / sanitize hands frequently. Avoid large groups of people until at least 12 weeks post op.    Cardiac Rehab:    We will send over orders for your Cardiac Rehab referral. It may take 7-10 business days to get authorization from your insurance company. The facility will call you to set up your first appointment once they have insurance authorization.

## 2022-08-22 DIAGNOSIS — Z98.890 S/P MITRAL VALVE REPAIR: Primary | ICD-10-CM

## 2023-07-03 PROBLEM — M54.16 LUMBAR RADICULOPATHY: Status: ACTIVE | Noted: 2023-07-03

## 2023-08-01 RX ORDER — HYDROCODONE BITARTRATE AND ACETAMINOPHEN 10; 325 MG/1; MG/1
1 TABLET ORAL
OUTPATIENT
Start: 2023-08-01

## 2024-04-05 NOTE — SUBJECTIVE & OBJECTIVE
Problem: At Risk for Falls  Goal: Patient does not fall  Outcome: Monitoring/Evaluating progress  Goal: Patient takes action to control fall-related risks  Outcome: Monitoring/Evaluating progress     Problem: At Risk for Injury Due to Fall  Goal: Patient does not fall  Outcome: Monitoring/Evaluating progress  Goal: Takes action to control condition specific risks  Outcome: Monitoring/Evaluating progress  Goal: Verbalizes understanding of fall-related injury personal risks  Description: Document education using the patient education activity  Outcome: Monitoring/Evaluating progress     Problem: Impaired Physical Mobility  Goal: Functional status is maintained or returned to baseline during hospitalization  Outcome: Monitoring/Evaluating progress  Goal: Tolerates activity for discharge setting with no abnormal symptoms  Outcome: Monitoring/Evaluating progress     Problem: Self Care Deficit  Goal: # Functional status is maintained or returned to baseline - REHAB ONLY  Outcome: Monitoring/Evaluating progress  Goal: Functional status is maintained or returned to baseline  Outcome: Monitoring/Evaluating progress  Goal: # Tolerates activity for d/c setting with no clinical problems  Outcome: Monitoring/Evaluating progress     Problem: Pain  Goal: Acceptable pain level achieved/maintained at rest using appropriate pain scale for the patient  Outcome: Monitoring/Evaluating progress  Goal: Acceptable pain level achieved/maintained with activity using appropriate pain scale for the patient  Outcome: Monitoring/Evaluating progress  Goal: Acceptable pain level achieved/maintained without oversedation  Outcome: Monitoring/Evaluating progress     Problem: Skin Integrity Alteration  Goal: Skin remains intact with no new/deterioration of wound or pressure injury  Outcome: Monitoring/Evaluating progress  Goal: Participates in wound care activities  Outcome: Monitoring/Evaluating progress  Goal: Comfort optimized with pressure  Interval History/Significant Events: Extubated and weaned off epi, started cleveprex. Reports significant back pain this morning and chest soreness.     Follow-up For: Procedure(s) (LRB):  Valvuloplasty, Mitral (N/A)    Post-Operative Day: 1 Day Post-Op    Objective:     Vital Signs (Most Recent):  Temp: 99.1 °F (37.3 °C) (07/23/22 0700)  Pulse: 76 (07/23/22 0915)  Resp: (!) 22 (07/23/22 0915)  BP: 122/60 (07/23/22 0913)  SpO2: (!) 94 % (07/23/22 0915)   Vital Signs (24h Range):  Temp:  [98 °F (36.7 °C)-99.1 °F (37.3 °C)] 99.1 °F (37.3 °C)  Pulse:  [66-83] 76  Resp:  [16-40] 22  SpO2:  [92 %-100 %] 94 %  BP: (115-148)/(58-84) 122/60  Arterial Line BP: ()/(55-82) 118/60     Weight: 134.7 kg (297 lb)  Body mass index is 42.62 kg/m².      Intake/Output Summary (Last 24 hours) at 7/23/2022 0953  Last data filed at 7/23/2022 0900  Gross per 24 hour   Intake 5021.26 ml   Output 4953 ml   Net 68.26 ml       Physical Exam  Vitals reviewed.   Constitutional:       General: He is not in acute distress.     Appearance: He is not ill-appearing.   HENT:      Head: Normocephalic and atraumatic.   Neck:      Comments: Right IJ CVC  Cardiovascular:      Rate and Rhythm: Normal rate and regular rhythm.      Comments: V-wires  Meds x2  Midline sternotomy incision  Pulmonary:      Effort: Pulmonary effort is normal. No respiratory distress.      Comments: On 4L by NC  Abdominal:      General: There is no distension.      Palpations: Abdomen is soft.   Genitourinary:     Comments: [place]   Musculoskeletal:      Comments: L ulnar art line   Neurological:      General: No focal deficit present.      Mental Status: He is alert and oriented to person, place, and time.       Vents:  Vent Mode: Spont (07/22/22 1745)  Ventilator Initiated: Yes (07/22/22 1403)  Set Rate: 26 BPM (07/22/22 1715)  Vt Set: 500 mL (07/22/22 1715)  Pressure Support: 8 cmH20 (07/22/22 1745)  PEEP/CPAP: 10 cmH20 (07/22/22 1745)  Oxygen Concentration (%): 40  (07/22/22 1745)  Peak Airway Pressure: 19 cmH2O (07/22/22 1745)  Plateau Pressure: 20 cmH20 (07/22/22 1745)  Total Ve: 15.6 mL (07/22/22 1745)  Negative Inspiratory Force (cm H2O): -30 (07/22/22 1745)  F/VT Ratio<105 (RSBI): (!) 47.04 (07/22/22 1745)    Lines/Drains/Airways       Central Venous Catheter Line  Duration              Introducer with Double Lumen 07/22/22 1038 right internal jugular <1 day    Percutaneous Central Line Insertion/Assessment - Triple Lumen  07/22/22 1038 right internal jugular <1 day              Drain  Duration                  Urethral Catheter 07/22/22 0740 Non-latex;Temperature probe;Straight-tip 14 Fr. 1 day         Y Chest Tube 1 and 2 07/22/22 1301 1 Right Mediastinal 19 Fr. 2 Left Mediastinal 19 Fr. <1 day              Arterial Line  Duration             Arterial Line 07/22/22 1031 Left Other (Comment) <1 day              Line  Duration                  Pacer Wires 07/22/22 1300 <1 day              Peripheral Intravenous Line  Duration                  Peripheral IV - Single Lumen 07/22/22 0618 18 G Left;Posterior Hand 1 day                    Significant Labs:    CBC/Anemia Profile:  Recent Labs   Lab 07/21/22  1131 07/22/22  0817 07/22/22  1407 07/22/22  1411 07/23/22  0327 07/23/22  0432 07/23/22  0839   WBC 8.55  --  34.42*  --   --  14.10*  --    HGB 15.6  --  15.4  --   --  12.4*  --    HCT 46.6   < > 45.6   < > 35* 37.1* 37     --  161  --   --  109*  --    MCV 99*  --  98  --   --  97  --    RDW 12.2  --  12.5  --   --  12.6  --     < > = values in this interval not displayed.        Chemistries:  Recent Labs   Lab 07/21/22  1131 07/22/22  1548 07/22/22  2019 07/23/22  0432    138 141 137   K 4.3 5.9* 4.3 4.3    110 114* 106   CO2 30* 17* 21* 22*   BUN 11 16 15 14   CREATININE 0.9 1.1 0.9 0.8   CALCIUM 9.2 7.6* 7.1* 7.6*   ALBUMIN 3.7 2.9*  --  3.3*   PROT 7.0 5.8*  --  5.4*   BILITOT 0.5 1.3*  --  1.0   ALKPHOS 69 65  --  46*   ALT 76* 96*  --  59*   AST  injury prevention strategies guided by patient/family preference. (Hospice)  Outcome: Monitoring/Evaluating progress  Goal: Wound care provided to promote comfort needs (Hospice)  Outcome: Monitoring/Evaluating progress     Problem: Fluid Volume Excess  Goal: Fluid Volume Excess Symptoms Resolved  Description: Treatment often consists of oxygen and respiratory support with diuretic therapy at doses that exceed usual dose (typically doubled).  Monitor patient response to treatment.    Acute dyspnea should resolve quickly if dose is adequate and kidney function is adequate. Dyspnea/SOB should only be observed with Activity after effective treatment. Patient should be able to lie down comfortably, without SOB.  Outcome: Monitoring/Evaluating progress  Goal: Oxygenation is maintained (SpO2 greater than or equal to 90% or as ordered)  Outcome: Monitoring/Evaluating progress  Goal: Verbalizes understanding of FVE management plan  Description: Document on Patient Education Activity  Outcome: Monitoring/Evaluating progress     Problem: Diabetes  Goal: Achieves glycemic balance  Description: Goal is to maintain blood sugar within range with no episodes of hypoglycemia  Outcome: Monitoring/Evaluating progress  Goal: Verbalizes/demonstrates understanding of NEW diagnosis of diabetes and management  Description: Document on Patient Education Activity  Outcome: Monitoring/Evaluating progress  Goal: Verbalizes understanding of diabetes management including how to use HbA1C to evaluate status of blood sugar over time (Diabetes is NOT a new diagnosis)  Description: Diabetes Education  Outcome: Monitoring/Evaluating progress  Goal: Demonstrates ability to self-administer insulin  Description: Document on Patient Education Activity  Outcome: Monitoring/Evaluating progress     Problem: VTE (Actual)  Goal: Patient maintains mobility and remains free from complications of VTE  Outcome: Monitoring/Evaluating progress      41* 151*  --  89*   MG  --  1.9 1.9 1.9   PHOS  --  3.5 2.7 3.6       All pertinent labs within the past 24 hours have been reviewed.    Significant Imaging:  I have reviewed all pertinent imaging results/findings within the past 24 hours.

## 2025-07-01 ENCOUNTER — HOSPITAL ENCOUNTER (OUTPATIENT)
Dept: RADIOLOGY | Facility: HOSPITAL | Age: 55
Discharge: HOME | End: 2025-07-01
Attending: OTOLARYNGOLOGY
Payer: MEDICARE

## 2025-07-01 DIAGNOSIS — R22.1 LOCALIZED SWELLING, MASS AND LUMP, NECK: ICD-10-CM

## 2025-07-01 PROCEDURE — 25500020 PHARM REV CODE 255: Performed by: OTOLARYNGOLOGY

## 2025-07-01 PROCEDURE — 70491 CT SOFT TISSUE NECK W/DYE: CPT | Mod: 26,,, | Performed by: RADIOLOGY

## 2025-07-01 PROCEDURE — 70491 CT SOFT TISSUE NECK W/DYE: CPT | Mod: TC

## 2025-07-01 RX ADMIN — IOHEXOL 75 ML: 350 INJECTION, SOLUTION INTRAVENOUS at 03:07

## (undated) DEVICE — SOL 9P NACL IRR PIC IL

## (undated) DEVICE — ELECTRODE REM PLYHSV RETURN 9

## (undated) DEVICE — KIT SAHARA DRAPE DRAW/LIFT

## (undated) DEVICE — SUT PROLENE 4-0 RB-1 BL MO

## (undated) DEVICE — CONTAINER SPECIMEN STRL 4OZ

## (undated) DEVICE — DRAIN CHANNEL ROUND 19FR

## (undated) DEVICE — SUT PROLENE 5-0 24 C-1 BL

## (undated) DEVICE — DRAPE INCISE IOBAN 2 23X17IN

## (undated) DEVICE — INSERTS STEALTH FIBRA SIZE 5

## (undated) DEVICE — SPONGE GAUZE 16PLY 4X4

## (undated) DEVICE — SUT 2-0 VICRYL / CT-1

## (undated) DEVICE — SUT VICRYL PLUS 3-0 FS1 27

## (undated) DEVICE — TRAY HEART OMC

## (undated) DEVICE — SUT VICRYL BR 1 GEN 27 CT-1

## (undated) DEVICE — DRAPE CVMAX SPLIT ANES SCRN

## (undated) DEVICE — Device

## (undated) DEVICE — DRAPE SLUSH WARMER WITH DISC

## (undated) DEVICE — DRAIN CHEST DRY SUCTION

## (undated) DEVICE — DRESSING AQUACEL SACRAL 9 X 9

## (undated) DEVICE — CANNULA RETROGRADE CARDIOPLEG

## (undated) DEVICE — SUT SILK 2-0 SH 18IN BLACK

## (undated) DEVICE — SYR 30CC LUER LOCK

## (undated) DEVICE — SUT 2/0 30IN ETHIBOND

## (undated) DEVICE — SUT PROLENE 3-0 SH DA 36 BL

## (undated) DEVICE — NDL 22GA X1 1/2 REG BEVEL

## (undated) DEVICE — FOGERTY SOFT JAW DISP 2/PK

## (undated) DEVICE — BOWL STERILE LARGE 32OZ

## (undated) DEVICE — GLOVE BIOGEL PI MICRO SZ 7.5

## (undated) DEVICE — SUT ETHIBOND XTRA 2-0 SH-2

## (undated) DEVICE — SUT 6 18IN STEEL MONO CCS

## (undated) DEVICE — CANNULA MULTIPLE PERFUSIONSET

## (undated) DEVICE — LOOP VESSEL BLUE MAXI

## (undated) DEVICE — SUT SILK BLK BR. 2 2-60

## (undated) DEVICE — DRESSING ADH ISLAND 3.6 X 14

## (undated) DEVICE — DRESSING MEPORE ADH 3.5X12

## (undated) DEVICE — SUT PROLENE 4-0 SH BLU 36IN

## (undated) DEVICE — GAUZE SPONGE 4X4 12PLY

## (undated) DEVICE — ADHESIVE DERMABOND ADVANCED

## (undated) DEVICE — KIT URINARY CATH URINE METER

## (undated) DEVICE — TIP YANKAUERS BULB NO VENT

## (undated) DEVICE — BLADE SAW STERNAL 5/BX